# Patient Record
Sex: MALE | Race: WHITE | NOT HISPANIC OR LATINO | Employment: OTHER | URBAN - NONMETROPOLITAN AREA
[De-identification: names, ages, dates, MRNs, and addresses within clinical notes are randomized per-mention and may not be internally consistent; named-entity substitution may affect disease eponyms.]

---

## 2018-12-27 ENCOUNTER — APPOINTMENT (OUTPATIENT)
Dept: CT IMAGING | Facility: HOSPITAL | Age: 63
End: 2018-12-27

## 2018-12-27 ENCOUNTER — APPOINTMENT (OUTPATIENT)
Dept: GENERAL RADIOLOGY | Facility: HOSPITAL | Age: 63
End: 2018-12-27

## 2018-12-27 ENCOUNTER — HOSPITAL ENCOUNTER (INPATIENT)
Facility: HOSPITAL | Age: 63
LOS: 4 days | Discharge: HOME | End: 2018-12-31
Attending: EMERGENCY MEDICINE | Admitting: INTERNAL MEDICINE

## 2018-12-27 DIAGNOSIS — E13.10 DIABETIC KETOACIDOSIS WITHOUT COMA ASSOCIATED WITH OTHER SPECIFIED DIABETES MELLITUS (HCC): Primary | ICD-10-CM

## 2018-12-27 DIAGNOSIS — N17.9 AKI (ACUTE KIDNEY INJURY) (HCC): ICD-10-CM

## 2018-12-27 DIAGNOSIS — R07.9 CHEST PAIN, UNSPECIFIED TYPE: ICD-10-CM

## 2018-12-27 DIAGNOSIS — E11.10 DIABETIC KETOACIDOSIS WITHOUT COMA ASSOCIATED WITH TYPE 2 DIABETES MELLITUS (HCC): ICD-10-CM

## 2018-12-27 LAB
ACETONE BLD QL: ABNORMAL
ALBUMIN SERPL-MCNC: 4 G/DL (ref 3.4–4.8)
ALBUMIN/GLOB SERPL: 1.5 G/DL (ref 1.1–1.8)
ALP SERPL-CCNC: 79 U/L (ref 38–126)
ALT SERPL W P-5'-P-CCNC: 14 U/L (ref 21–72)
ANION GAP SERPL CALCULATED.3IONS-SCNC: 22 MMOL/L (ref 5–15)
APTT PPP: 31.9 SECONDS (ref 20–40.3)
ARTERIAL PATENCY WRIST A: ABNORMAL
AST SERPL-CCNC: 20 U/L (ref 17–59)
ATMOSPHERIC PRESS: 743 MMHG
BASE EXCESS BLDA CALC-SCNC: -12.3 MMOL/L (ref 0–2)
BASOPHILS # BLD AUTO: 0.02 10*3/MM3 (ref 0–0.2)
BASOPHILS NFR BLD AUTO: 0.2 % (ref 0–2)
BDY SITE: ABNORMAL
BILIRUB SERPL-MCNC: 0.3 MG/DL (ref 0.2–1.3)
BUN BLD-MCNC: 91 MG/DL (ref 7–21)
BUN/CREAT SERPL: 11.4 (ref 7–25)
CALCIUM SPEC-SCNC: 7.4 MG/DL (ref 8.4–10.2)
CHLORIDE SERPL-SCNC: 93 MMOL/L (ref 95–110)
CK MB SERPL-CCNC: 6.27 NG/ML (ref 0–5)
CK SERPL-CCNC: 219 U/L (ref 55–170)
CO2 SERPL-SCNC: 12 MMOL/L (ref 22–31)
CREAT BLD-MCNC: 7.96 MG/DL (ref 0.7–1.3)
D-DIMER, QUANTITATIVE (MAD,POW, STR): 1208 NG/ML (FEU) (ref 0–470)
D-LACTATE SERPL-SCNC: 1.1 MMOL/L (ref 0.5–2)
DEPRECATED RDW RBC AUTO: 47 FL (ref 35.1–43.9)
EOSINOPHIL # BLD AUTO: 0.07 10*3/MM3 (ref 0–0.7)
EOSINOPHIL NFR BLD AUTO: 0.6 % (ref 0–7)
ERYTHROCYTE [DISTWIDTH] IN BLOOD BY AUTOMATED COUNT: 14.9 % (ref 11.5–14.5)
GFR SERPL CREATININE-BSD FRML MDRD: 7 ML/MIN/1.73 (ref 49–113)
GFR SERPL CREATININE-BSD FRML MDRD: ABNORMAL ML/MIN/1.73 (ref 49–113)
GLOBULIN UR ELPH-MCNC: 2.6 GM/DL (ref 2.3–3.5)
GLUCOSE BLD-MCNC: 450 MG/DL (ref 60–100)
HCO3 BLDA-SCNC: 13.6 MMOL/L (ref 20–26)
HCT VFR BLD AUTO: 26.7 % (ref 39–49)
HGB BLD-MCNC: 9.2 G/DL (ref 13.7–17.3)
HOLD SPECIMEN: NORMAL
HOLD SPECIMEN: NORMAL
IMM GRANULOCYTES # BLD AUTO: 0.04 10*3/MM3 (ref 0–0.02)
IMM GRANULOCYTES NFR BLD AUTO: 0.3 % (ref 0–0.5)
INR PPP: 0.94 (ref 0.8–1.2)
LIPASE SERPL-CCNC: 24 U/L (ref 23–300)
LYMPHOCYTES # BLD AUTO: 1.62 10*3/MM3 (ref 0.6–4.2)
LYMPHOCYTES NFR BLD AUTO: 14 % (ref 10–50)
Lab: ABNORMAL
MCH RBC QN AUTO: 29.5 PG (ref 26.5–34)
MCHC RBC AUTO-ENTMCNC: 34.5 G/DL (ref 31.5–36.3)
MCV RBC AUTO: 85.6 FL (ref 80–98)
MODALITY: ABNORMAL
MONOCYTES # BLD AUTO: 0.87 10*3/MM3 (ref 0–0.9)
MONOCYTES NFR BLD AUTO: 7.5 % (ref 0–12)
NEUTROPHILS # BLD AUTO: 8.98 10*3/MM3 (ref 2–8.6)
NEUTROPHILS NFR BLD AUTO: 77.4 % (ref 37–80)
NT-PROBNP SERPL-MCNC: 6120 PG/ML (ref 0–900)
PCO2 BLDA: 30.4 MM HG (ref 35–45)
PH BLDA: 7.26 PH UNITS (ref 7.35–7.45)
PLATELET # BLD AUTO: 233 10*3/MM3 (ref 150–450)
PMV BLD AUTO: 9.2 FL (ref 8–12)
PO2 BLDA: 35.6 MM HG (ref 83–108)
POTASSIUM BLD-SCNC: 4.3 MMOL/L (ref 3.5–5.1)
PROT SERPL-MCNC: 6.6 G/DL (ref 6.3–8.6)
PROTHROMBIN TIME: 12.4 SECONDS (ref 11.1–15.3)
RBC # BLD AUTO: 3.12 10*6/MM3 (ref 4.37–5.74)
SAO2 % BLDCOA: 63 % (ref 94–99)
SODIUM BLD-SCNC: 127 MMOL/L (ref 137–145)
TROPONIN I SERPL-MCNC: 0.02 NG/ML
VENTILATOR MODE: ABNORMAL
WBC NRBC COR # BLD: 11.6 10*3/MM3 (ref 3.2–9.8)
WHOLE BLOOD HOLD SPECIMEN: NORMAL
WHOLE BLOOD HOLD SPECIMEN: NORMAL

## 2018-12-27 PROCEDURE — 83880 ASSAY OF NATRIURETIC PEPTIDE: CPT

## 2018-12-27 PROCEDURE — 71045 X-RAY EXAM CHEST 1 VIEW: CPT

## 2018-12-27 PROCEDURE — 85379 FIBRIN DEGRADATION QUANT: CPT | Performed by: EMERGENCY MEDICINE

## 2018-12-27 PROCEDURE — 83690 ASSAY OF LIPASE: CPT | Performed by: EMERGENCY MEDICINE

## 2018-12-27 PROCEDURE — 85610 PROTHROMBIN TIME: CPT | Performed by: EMERGENCY MEDICINE

## 2018-12-27 PROCEDURE — 80053 COMPREHEN METABOLIC PANEL: CPT

## 2018-12-27 PROCEDURE — 25010000002 ONDANSETRON PER 1 MG: Performed by: EMERGENCY MEDICINE

## 2018-12-27 PROCEDURE — 84484 ASSAY OF TROPONIN QUANT: CPT

## 2018-12-27 PROCEDURE — 99285 EMERGENCY DEPT VISIT HI MDM: CPT

## 2018-12-27 PROCEDURE — 82009 KETONE BODYS QUAL: CPT | Performed by: EMERGENCY MEDICINE

## 2018-12-27 PROCEDURE — 93010 ELECTROCARDIOGRAM REPORT: CPT | Performed by: INTERNAL MEDICINE

## 2018-12-27 PROCEDURE — 87040 BLOOD CULTURE FOR BACTERIA: CPT | Performed by: EMERGENCY MEDICINE

## 2018-12-27 PROCEDURE — 84484 ASSAY OF TROPONIN QUANT: CPT | Performed by: EMERGENCY MEDICINE

## 2018-12-27 PROCEDURE — 82962 GLUCOSE BLOOD TEST: CPT

## 2018-12-27 PROCEDURE — 85025 COMPLETE CBC W/AUTO DIFF WBC: CPT

## 2018-12-27 PROCEDURE — 36600 WITHDRAWAL OF ARTERIAL BLOOD: CPT

## 2018-12-27 PROCEDURE — 82803 BLOOD GASES ANY COMBINATION: CPT

## 2018-12-27 PROCEDURE — 93005 ELECTROCARDIOGRAM TRACING: CPT | Performed by: EMERGENCY MEDICINE

## 2018-12-27 PROCEDURE — 25010000002 MORPHINE PER 10 MG: Performed by: EMERGENCY MEDICINE

## 2018-12-27 PROCEDURE — 25010000002 HEPARIN (PORCINE) PER 1000 UNITS: Performed by: EMERGENCY MEDICINE

## 2018-12-27 PROCEDURE — 71250 CT THORAX DX C-: CPT

## 2018-12-27 PROCEDURE — 93005 ELECTROCARDIOGRAM TRACING: CPT

## 2018-12-27 PROCEDURE — 83605 ASSAY OF LACTIC ACID: CPT | Performed by: EMERGENCY MEDICINE

## 2018-12-27 PROCEDURE — 82553 CREATINE MB FRACTION: CPT | Performed by: EMERGENCY MEDICINE

## 2018-12-27 PROCEDURE — 85730 THROMBOPLASTIN TIME PARTIAL: CPT | Performed by: EMERGENCY MEDICINE

## 2018-12-27 PROCEDURE — 82550 ASSAY OF CK (CPK): CPT | Performed by: EMERGENCY MEDICINE

## 2018-12-27 PROCEDURE — 74176 CT ABD & PELVIS W/O CONTRAST: CPT

## 2018-12-27 PROCEDURE — 63710000001 INSULIN REGULAR HUMAN PER 5 UNITS: Performed by: EMERGENCY MEDICINE

## 2018-12-27 PROCEDURE — 36415 COLL VENOUS BLD VENIPUNCTURE: CPT | Performed by: EMERGENCY MEDICINE

## 2018-12-27 PROCEDURE — 25010000002 FENTANYL CITRATE (PF) 100 MCG/2ML SOLUTION: Performed by: EMERGENCY MEDICINE

## 2018-12-27 RX ORDER — ONDANSETRON 2 MG/ML
4 INJECTION INTRAMUSCULAR; INTRAVENOUS ONCE
Status: COMPLETED | OUTPATIENT
Start: 2018-12-27 | End: 2018-12-27

## 2018-12-27 RX ORDER — ASPIRIN 81 MG/1
81 TABLET, CHEWABLE ORAL DAILY
COMMUNITY
End: 2018-12-31 | Stop reason: HOSPADM

## 2018-12-27 RX ORDER — SODIUM CHLORIDE 450 MG/100ML
250 INJECTION, SOLUTION INTRAVENOUS CONTINUOUS
Status: DISCONTINUED | OUTPATIENT
Start: 2018-12-27 | End: 2018-12-28

## 2018-12-27 RX ORDER — MAGNESIUM SULFATE HEPTAHYDRATE 40 MG/ML
2 INJECTION, SOLUTION INTRAVENOUS AS NEEDED
Status: DISCONTINUED | OUTPATIENT
Start: 2018-12-27 | End: 2018-12-31 | Stop reason: HOSPADM

## 2018-12-27 RX ORDER — SODIUM CHLORIDE 0.9 % (FLUSH) 0.9 %
10 SYRINGE (ML) INJECTION AS NEEDED
Status: DISCONTINUED | OUTPATIENT
Start: 2018-12-27 | End: 2018-12-31 | Stop reason: HOSPADM

## 2018-12-27 RX ORDER — LOSARTAN POTASSIUM 25 MG/1
100 TABLET ORAL DAILY
COMMUNITY

## 2018-12-27 RX ORDER — DEXTROSE AND SODIUM CHLORIDE 5; .45 G/100ML; G/100ML
150 INJECTION, SOLUTION INTRAVENOUS CONTINUOUS PRN
Status: DISCONTINUED | OUTPATIENT
Start: 2018-12-27 | End: 2018-12-29

## 2018-12-27 RX ORDER — POTASSIUM CHLORIDE 1.5 G/1.77G
40 POWDER, FOR SOLUTION ORAL AS NEEDED
Status: DISCONTINUED | OUTPATIENT
Start: 2018-12-27 | End: 2018-12-31 | Stop reason: HOSPADM

## 2018-12-27 RX ORDER — FENTANYL CITRATE 50 UG/ML
50 INJECTION, SOLUTION INTRAMUSCULAR; INTRAVENOUS ONCE
Status: COMPLETED | OUTPATIENT
Start: 2018-12-27 | End: 2018-12-27

## 2018-12-27 RX ORDER — NITROGLYCERIN 0.4 MG/1
0.4 TABLET SUBLINGUAL
Status: DISCONTINUED | OUTPATIENT
Start: 2018-12-27 | End: 2018-12-31 | Stop reason: HOSPADM

## 2018-12-27 RX ORDER — GUANFACINE 1 MG/1
1 TABLET ORAL NIGHTLY
COMMUNITY
End: 2020-05-05 | Stop reason: HOSPADM

## 2018-12-27 RX ORDER — HEPARIN SODIUM 5000 [USP'U]/ML
60 INJECTION, SOLUTION INTRAVENOUS; SUBCUTANEOUS ONCE
Status: COMPLETED | OUTPATIENT
Start: 2018-12-27 | End: 2018-12-27

## 2018-12-27 RX ORDER — POTASSIUM CHLORIDE 7.45 MG/ML
10 INJECTION INTRAVENOUS AS NEEDED
Status: DISCONTINUED | OUTPATIENT
Start: 2018-12-27 | End: 2018-12-31 | Stop reason: HOSPADM

## 2018-12-27 RX ORDER — HYDROCODONE BITARTRATE AND ACETAMINOPHEN 7.5; 325 MG/1; MG/1
1 TABLET ORAL EVERY 8 HOURS PRN
COMMUNITY

## 2018-12-27 RX ORDER — MAGNESIUM SULFATE HEPTAHYDRATE 40 MG/ML
4 INJECTION, SOLUTION INTRAVENOUS AS NEEDED
Status: DISCONTINUED | OUTPATIENT
Start: 2018-12-27 | End: 2018-12-31 | Stop reason: HOSPADM

## 2018-12-27 RX ORDER — HEPARIN SODIUM 5000 [USP'U]/ML
80 INJECTION, SOLUTION INTRAVENOUS; SUBCUTANEOUS AS NEEDED
Status: DISCONTINUED | OUTPATIENT
Start: 2018-12-27 | End: 2018-12-29

## 2018-12-27 RX ORDER — POTASSIUM CHLORIDE 750 MG/1
40 CAPSULE, EXTENDED RELEASE ORAL AS NEEDED
Status: DISCONTINUED | OUTPATIENT
Start: 2018-12-27 | End: 2018-12-31 | Stop reason: HOSPADM

## 2018-12-27 RX ORDER — HYDRALAZINE HYDROCHLORIDE 50 MG/1
100 TABLET, FILM COATED ORAL 3 TIMES DAILY
COMMUNITY
End: 2020-05-05 | Stop reason: HOSPADM

## 2018-12-27 RX ORDER — ASPIRIN 325 MG
325 TABLET ORAL ONCE
Status: COMPLETED | OUTPATIENT
Start: 2018-12-27 | End: 2018-12-27

## 2018-12-27 RX ORDER — NITROGLYCERIN 20 MG/100ML
10-50 INJECTION INTRAVENOUS
Status: DISCONTINUED | OUTPATIENT
Start: 2018-12-27 | End: 2018-12-27

## 2018-12-27 RX ORDER — TORSEMIDE 20 MG/1
20 TABLET ORAL 3 TIMES DAILY
COMMUNITY

## 2018-12-27 RX ORDER — HEPARIN SODIUM 5000 [USP'U]/ML
40 INJECTION, SOLUTION INTRAVENOUS; SUBCUTANEOUS AS NEEDED
Status: DISCONTINUED | OUTPATIENT
Start: 2018-12-27 | End: 2018-12-29

## 2018-12-27 RX ORDER — POTASSIUM CHLORIDE 750 MG/1
10 CAPSULE, EXTENDED RELEASE ORAL AS NEEDED
Status: DISCONTINUED | OUTPATIENT
Start: 2018-12-27 | End: 2018-12-31 | Stop reason: HOSPADM

## 2018-12-27 RX ORDER — SODIUM CHLORIDE AND POTASSIUM CHLORIDE 150; 450 MG/100ML; MG/100ML
250 INJECTION, SOLUTION INTRAVENOUS CONTINUOUS PRN
Status: DISCONTINUED | OUTPATIENT
Start: 2018-12-27 | End: 2018-12-31 | Stop reason: HOSPADM

## 2018-12-27 RX ORDER — DEXTROSE, SODIUM CHLORIDE, AND POTASSIUM CHLORIDE 5; .45; .15 G/100ML; G/100ML; G/100ML
150 INJECTION INTRAVENOUS CONTINUOUS PRN
Status: DISCONTINUED | OUTPATIENT
Start: 2018-12-27 | End: 2018-12-29

## 2018-12-27 RX ORDER — HEPARIN SODIUM 10000 [USP'U]/100ML
12 INJECTION, SOLUTION INTRAVENOUS
Status: DISCONTINUED | OUTPATIENT
Start: 2018-12-27 | End: 2018-12-29

## 2018-12-27 RX ORDER — POTASSIUM CHLORIDE 750 MG/1
20 CAPSULE, EXTENDED RELEASE ORAL AS NEEDED
Status: DISCONTINUED | OUTPATIENT
Start: 2018-12-27 | End: 2018-12-31 | Stop reason: HOSPADM

## 2018-12-27 RX ORDER — MAGNESIUM SULFATE 1 G/100ML
1 INJECTION INTRAVENOUS AS NEEDED
Status: DISCONTINUED | OUTPATIENT
Start: 2018-12-27 | End: 2018-12-31 | Stop reason: HOSPADM

## 2018-12-27 RX ORDER — POTASSIUM CHLORIDE 1.5 G/1.77G
20 POWDER, FOR SOLUTION ORAL AS NEEDED
Status: DISCONTINUED | OUTPATIENT
Start: 2018-12-27 | End: 2018-12-31 | Stop reason: HOSPADM

## 2018-12-27 RX ORDER — DEXTROSE MONOHYDRATE 25 G/50ML
12.5 INJECTION, SOLUTION INTRAVENOUS
Status: DISCONTINUED | OUTPATIENT
Start: 2018-12-27 | End: 2018-12-31 | Stop reason: HOSPADM

## 2018-12-27 RX ORDER — SODIUM CHLORIDE 9 MG/ML
125 INJECTION, SOLUTION INTRAVENOUS CONTINUOUS
Status: DISCONTINUED | OUTPATIENT
Start: 2018-12-27 | End: 2018-12-28

## 2018-12-27 RX ORDER — MINOXIDIL 2.5 MG/1
2.5 TABLET ORAL DAILY
Status: ON HOLD | COMMUNITY
End: 2020-05-28

## 2018-12-27 RX ORDER — POTASSIUM CHLORIDE 1.5 G/1.77G
10 POWDER, FOR SOLUTION ORAL AS NEEDED
Status: DISCONTINUED | OUTPATIENT
Start: 2018-12-27 | End: 2018-12-31 | Stop reason: HOSPADM

## 2018-12-27 RX ADMIN — ASPIRIN 325 MG: 325 TABLET, COATED ORAL at 19:08

## 2018-12-27 RX ADMIN — POTASSIUM CHLORIDE AND SODIUM CHLORIDE 250 ML/HR: 450; 150 INJECTION, SOLUTION INTRAVENOUS at 23:15

## 2018-12-27 RX ADMIN — MORPHINE SULFATE 4 MG: 4 INJECTION INTRAVENOUS at 21:23

## 2018-12-27 RX ADMIN — SODIUM CHLORIDE 1000 ML: 9 INJECTION, SOLUTION INTRAVENOUS at 21:28

## 2018-12-27 RX ADMIN — FENTANYL CITRATE 50 MCG: 50 INJECTION, SOLUTION INTRAMUSCULAR; INTRAVENOUS at 22:31

## 2018-12-27 RX ADMIN — ONDANSETRON 4 MG: 2 INJECTION INTRAMUSCULAR; INTRAVENOUS at 21:23

## 2018-12-27 RX ADMIN — NITROGLYCERIN 0.4 MG: 0.4 TABLET, ORALLY DISINTEGRATING SUBLINGUAL at 20:08

## 2018-12-27 RX ADMIN — SODIUM CHLORIDE 0.1 UNITS/KG/HR: 9 INJECTION, SOLUTION INTRAVENOUS at 21:50

## 2018-12-27 RX ADMIN — SODIUM CHLORIDE 1000 ML: 9 INJECTION, SOLUTION INTRAVENOUS at 20:08

## 2018-12-27 RX ADMIN — NITROGLYCERIN 0.4 MG: 0.4 TABLET, ORALLY DISINTEGRATING SUBLINGUAL at 21:10

## 2018-12-27 RX ADMIN — NITROGLYCERIN 0.4 MG: 0.4 TABLET, ORALLY DISINTEGRATING SUBLINGUAL at 20:28

## 2018-12-27 RX ADMIN — NITROGLYCERIN 0.4 MG: 0.4 TABLET, ORALLY DISINTEGRATING SUBLINGUAL at 23:57

## 2018-12-27 RX ADMIN — HEPARIN SODIUM 4500 UNITS: 5000 INJECTION INTRAVENOUS; SUBCUTANEOUS at 23:50

## 2018-12-27 RX ADMIN — HEPARIN SODIUM 12 UNITS/KG/HR: 10000 INJECTION, SOLUTION INTRAVENOUS at 23:43

## 2018-12-28 ENCOUNTER — APPOINTMENT (OUTPATIENT)
Dept: CARDIOLOGY | Facility: HOSPITAL | Age: 63
End: 2018-12-28
Attending: FAMILY MEDICINE

## 2018-12-28 ENCOUNTER — APPOINTMENT (OUTPATIENT)
Dept: ULTRASOUND IMAGING | Facility: HOSPITAL | Age: 63
End: 2018-12-28

## 2018-12-28 ENCOUNTER — APPOINTMENT (OUTPATIENT)
Dept: NUCLEAR MEDICINE | Facility: HOSPITAL | Age: 63
End: 2018-12-28

## 2018-12-28 PROBLEM — N18.5 CKD (CHRONIC KIDNEY DISEASE) STAGE 5, GFR LESS THAN 15 ML/MIN (HCC): Chronic | Status: ACTIVE | Noted: 2018-12-28

## 2018-12-28 PROBLEM — I21.4 NSTEMI (NON-ST ELEVATED MYOCARDIAL INFARCTION) (HCC): Status: ACTIVE | Noted: 2018-12-28

## 2018-12-28 LAB
ABO GROUP BLD: NORMAL
ABO GROUP BLD: NORMAL
ANION GAP SERPL CALCULATED.3IONS-SCNC: 13 MMOL/L (ref 5–15)
ANION GAP SERPL CALCULATED.3IONS-SCNC: 14 MMOL/L (ref 5–15)
ANION GAP SERPL CALCULATED.3IONS-SCNC: 14 MMOL/L (ref 5–15)
ANION GAP SERPL CALCULATED.3IONS-SCNC: 15 MMOL/L (ref 5–15)
ANION GAP SERPL CALCULATED.3IONS-SCNC: 16 MMOL/L (ref 5–15)
ANION GAP SERPL CALCULATED.3IONS-SCNC: 18 MMOL/L (ref 5–15)
APTT PPP: 27.6 SECONDS (ref 20–40.3)
ATMOSPHERIC PRESS: ABNORMAL MMHG
BACTERIA UR QL AUTO: ABNORMAL /HPF
BASE EXCESS BLDV CALC-SCNC: -10.4 MMOL/L (ref 0–2)
BASE EXCESS BLDV CALC-SCNC: -11.3 MMOL/L (ref 0–2)
BASE EXCESS BLDV CALC-SCNC: -11.9 MMOL/L (ref 0–2)
BASE EXCESS BLDV CALC-SCNC: -11.9 MMOL/L (ref 0–2)
BASE EXCESS BLDV CALC-SCNC: -13.9 MMOL/L (ref 0–2)
BASE EXCESS BLDV CALC-SCNC: -8.2 MMOL/L (ref 0–2)
BASOPHILS # BLD AUTO: 0 10*3/MM3 (ref 0–0.2)
BASOPHILS NFR BLD AUTO: 0 % (ref 0–2)
BDY SITE: ABNORMAL
BH CV ECHO MEAS - ACS: 2 CM
BH CV ECHO MEAS - AO MAX PG (FULL): 2.3 MMHG
BH CV ECHO MEAS - AO MAX PG: 11.2 MMHG
BH CV ECHO MEAS - AO MEAN PG (FULL): 1.6 MMHG
BH CV ECHO MEAS - AO MEAN PG: 6.5 MMHG
BH CV ECHO MEAS - AO ROOT AREA (BSA CORRECTED): 1.6
BH CV ECHO MEAS - AO ROOT AREA: 7.2 CM^2
BH CV ECHO MEAS - AO ROOT DIAM: 3 CM
BH CV ECHO MEAS - AO V2 MAX: 167 CM/SEC
BH CV ECHO MEAS - AO V2 MEAN: 122.9 CM/SEC
BH CV ECHO MEAS - AO V2 VTI: 42 CM
BH CV ECHO MEAS - ASC AORTA: 2.5 CM
BH CV ECHO MEAS - AVA(I,A): 3.1 CM^2
BH CV ECHO MEAS - AVA(I,D): 3.1 CM^2
BH CV ECHO MEAS - AVA(V,A): 3.1 CM^2
BH CV ECHO MEAS - AVA(V,D): 3.1 CM^2
BH CV ECHO MEAS - BSA(HAYCOCK): 1.9 M^2
BH CV ECHO MEAS - BSA: 1.9 M^2
BH CV ECHO MEAS - BZI_BMI: 23.7 KILOGRAMS/M^2
BH CV ECHO MEAS - BZI_METRIC_HEIGHT: 178 CM
BH CV ECHO MEAS - BZI_METRIC_WEIGHT: 75 KG
BH CV ECHO MEAS - EDV(CUBED): 108.1 ML
BH CV ECHO MEAS - EDV(TEICH): 105.6 ML
BH CV ECHO MEAS - EF(CUBED): 74.9 %
BH CV ECHO MEAS - EF(TEICH): 66.7 %
BH CV ECHO MEAS - ESV(CUBED): 27.1 ML
BH CV ECHO MEAS - ESV(TEICH): 35.2 ML
BH CV ECHO MEAS - FS: 36.9 %
BH CV ECHO MEAS - IVS/LVPW: 0.84
BH CV ECHO MEAS - IVSD: 1 CM
BH CV ECHO MEAS - LA DIMENSION: 4 CM
BH CV ECHO MEAS - LA/AO: 1.3
BH CV ECHO MEAS - LV MASS(C)D: 195 GRAMS
BH CV ECHO MEAS - LV MASS(C)DI: 101.2 GRAMS/M^2
BH CV ECHO MEAS - LV MAX PG: 8.9 MMHG
BH CV ECHO MEAS - LV MEAN PG: 4.9 MMHG
BH CV ECHO MEAS - LV V1 MAX: 149 CM/SEC
BH CV ECHO MEAS - LV V1 MEAN: 105 CM/SEC
BH CV ECHO MEAS - LV V1 VTI: 36.8 CM
BH CV ECHO MEAS - LVIDD: 4.8 CM
BH CV ECHO MEAS - LVIDS: 3 CM
BH CV ECHO MEAS - LVOT AREA: 3.5 CM^2
BH CV ECHO MEAS - LVOT DIAM: 2.1 CM
BH CV ECHO MEAS - LVPWD: 1.2 CM
BH CV ECHO MEAS - MR MAX PG: 29.8 MMHG
BH CV ECHO MEAS - MR MAX VEL: 273 CM/SEC
BH CV ECHO MEAS - MV A MAX VEL: 94.3 CM/SEC
BH CV ECHO MEAS - MV E MAX VEL: 162.7 CM/SEC
BH CV ECHO MEAS - MV E/A: 1.7
BH CV ECHO MEAS - MV P1/2T MAX VEL: 183 CM/SEC
BH CV ECHO MEAS - MVA P1/2T LCG: 1.2 CM^2
BH CV ECHO MEAS - PA MAX PG: 5.2 MMHG
BH CV ECHO MEAS - PA V2 MAX: 113.8 CM/SEC
BH CV ECHO MEAS - RAP SYSTOLE: 10 MMHG
BH CV ECHO MEAS - RVDD: 2.7 CM
BH CV ECHO MEAS - RVSP: 39 MMHG
BH CV ECHO MEAS - SI(AO): 157.7 ML/M^2
BH CV ECHO MEAS - SI(CUBED): 42 ML/M^2
BH CV ECHO MEAS - SI(LVOT): 67.2 ML/M^2
BH CV ECHO MEAS - SI(TEICH): 36.6 ML/M^2
BH CV ECHO MEAS - SV(AO): 303.8 ML
BH CV ECHO MEAS - SV(CUBED): 81 ML
BH CV ECHO MEAS - SV(LVOT): 129.4 ML
BH CV ECHO MEAS - SV(TEICH): 70.5 ML
BH CV ECHO MEAS - TR MAX VEL: 255.9 CM/SEC
BILIRUB UR QL STRIP: NEGATIVE
BLD GP AB SCN SERPL QL: NEGATIVE
BUN BLD-MCNC: 93 MG/DL (ref 7–21)
BUN BLD-MCNC: 93 MG/DL (ref 7–21)
BUN BLD-MCNC: 94 MG/DL (ref 7–21)
BUN BLD-MCNC: 94 MG/DL (ref 7–21)
BUN BLD-MCNC: 97 MG/DL (ref 7–21)
BUN BLD-MCNC: 99 MG/DL (ref 7–21)
BUN/CREAT SERPL: 12.4 (ref 7–25)
BUN/CREAT SERPL: 12.8 (ref 7–25)
BUN/CREAT SERPL: 12.8 (ref 7–25)
BUN/CREAT SERPL: 13 (ref 7–25)
BUN/CREAT SERPL: 13.7 (ref 7–25)
BUN/CREAT SERPL: 13.9 (ref 7–25)
CA-I BLD-MCNC: 3.69 MG/DL (ref 4.6–5.6)
CA-I BLD-MCNC: 3.74 MG/DL (ref 4.6–5.6)
CA-I BLD-MCNC: 3.8 MG/DL (ref 4.6–5.6)
CA-I BLD-MCNC: 3.82 MG/DL (ref 4.6–5.6)
CA-I BLD-MCNC: 3.84 MG/DL (ref 4.6–5.6)
CA-I BLD-MCNC: 3.86 MG/DL (ref 4.6–5.6)
CALCIUM SPEC-SCNC: 6.8 MG/DL (ref 8.4–10.2)
CALCIUM SPEC-SCNC: 6.9 MG/DL (ref 8.4–10.2)
CALCIUM SPEC-SCNC: 6.9 MG/DL (ref 8.4–10.2)
CALCIUM SPEC-SCNC: 7 MG/DL (ref 8.4–10.2)
CALCIUM SPEC-SCNC: 7.1 MG/DL (ref 8.4–10.2)
CALCIUM SPEC-SCNC: 7.2 MG/DL (ref 8.4–10.2)
CHLORIDE SERPL-SCNC: 102 MMOL/L (ref 95–110)
CHLORIDE SERPL-SCNC: 102 MMOL/L (ref 95–110)
CHLORIDE SERPL-SCNC: 103 MMOL/L (ref 95–110)
CHLORIDE SERPL-SCNC: 104 MMOL/L (ref 95–110)
CHLORIDE SERPL-SCNC: 99 MMOL/L (ref 95–110)
CHLORIDE SERPL-SCNC: 99 MMOL/L (ref 95–110)
CLARITY UR: CLEAR
CO2 SERPL-SCNC: 12 MMOL/L (ref 22–31)
CO2 SERPL-SCNC: 13 MMOL/L (ref 22–31)
CO2 SERPL-SCNC: 14 MMOL/L (ref 22–31)
CO2 SERPL-SCNC: 16 MMOL/L (ref 22–31)
COLOR UR: YELLOW
CREAT BLD-MCNC: 7.1 MG/DL (ref 0.7–1.3)
CREAT BLD-MCNC: 7.12 MG/DL (ref 0.7–1.3)
CREAT BLD-MCNC: 7.17 MG/DL (ref 0.7–1.3)
CREAT BLD-MCNC: 7.28 MG/DL (ref 0.7–1.3)
CREAT BLD-MCNC: 7.33 MG/DL (ref 0.7–1.3)
CREAT BLD-MCNC: 7.56 MG/DL (ref 0.7–1.3)
DEPRECATED RDW RBC AUTO: 46.4 FL (ref 35.1–43.9)
EOSINOPHIL # BLD AUTO: 0 10*3/MM3 (ref 0–0.7)
EOSINOPHIL NFR BLD AUTO: 0 % (ref 0–7)
ERYTHROCYTE [DISTWIDTH] IN BLOOD BY AUTOMATED COUNT: 14.6 % (ref 11.5–14.5)
GFR SERPL CREATININE-BSD FRML MDRD: 7 ML/MIN/1.73 (ref 49–113)
GFR SERPL CREATININE-BSD FRML MDRD: 8 ML/MIN/1.73 (ref 49–113)
GFR SERPL CREATININE-BSD FRML MDRD: ABNORMAL ML/MIN/1.73 (ref 49–113)
GLUCOSE BLD-MCNC: 126 MG/DL (ref 60–100)
GLUCOSE BLD-MCNC: 129 MG/DL (ref 60–100)
GLUCOSE BLD-MCNC: 162 MG/DL (ref 60–100)
GLUCOSE BLD-MCNC: 215 MG/DL (ref 60–100)
GLUCOSE BLD-MCNC: 335 MG/DL (ref 60–100)
GLUCOSE BLD-MCNC: 96 MG/DL (ref 60–100)
GLUCOSE BLDC GLUCOMTR-MCNC: 121 MG/DL (ref 70–130)
GLUCOSE BLDC GLUCOMTR-MCNC: 142 MG/DL (ref 70–130)
GLUCOSE BLDC GLUCOMTR-MCNC: 143 MG/DL (ref 70–130)
GLUCOSE BLDC GLUCOMTR-MCNC: 144 MG/DL (ref 70–130)
GLUCOSE BLDC GLUCOMTR-MCNC: 151 MG/DL (ref 70–130)
GLUCOSE BLDC GLUCOMTR-MCNC: 154 MG/DL (ref 70–130)
GLUCOSE BLDC GLUCOMTR-MCNC: 185 MG/DL (ref 70–130)
GLUCOSE BLDC GLUCOMTR-MCNC: 200 MG/DL (ref 70–130)
GLUCOSE BLDC GLUCOMTR-MCNC: 237 MG/DL (ref 70–130)
GLUCOSE BLDC GLUCOMTR-MCNC: 311 MG/DL (ref 70–130)
GLUCOSE BLDC GLUCOMTR-MCNC: 383 MG/DL (ref 70–130)
GLUCOSE BLDC GLUCOMTR-MCNC: 90 MG/DL (ref 70–130)
GLUCOSE UR STRIP-MCNC: ABNORMAL MG/DL
HCO3 BLDV-SCNC: 12.2 MMOL/L (ref 18–23)
HCO3 BLDV-SCNC: 14.1 MMOL/L (ref 18–23)
HCO3 BLDV-SCNC: 14.3 MMOL/L (ref 18–23)
HCO3 BLDV-SCNC: 14.8 MMOL/L (ref 18–23)
HCO3 BLDV-SCNC: 15 MMOL/L (ref 18–23)
HCO3 BLDV-SCNC: 16.5 MMOL/L (ref 18–23)
HCT VFR BLD AUTO: 20.9 % (ref 39–49)
HCT VFR BLD AUTO: 21.1 % (ref 39–49)
HGB BLD-MCNC: 7.1 G/DL (ref 13.7–17.3)
HGB BLD-MCNC: 7.3 G/DL (ref 13.7–17.3)
HGB UR QL STRIP.AUTO: NEGATIVE
HYALINE CASTS UR QL AUTO: ABNORMAL /LPF
IMM GRANULOCYTES # BLD AUTO: 0.02 10*3/MM3 (ref 0–0.02)
IMM GRANULOCYTES NFR BLD AUTO: 0.2 % (ref 0–0.5)
KETONES UR QL STRIP: NEGATIVE
LEUKOCYTE ESTERASE UR QL STRIP.AUTO: NEGATIVE
LYMPHOCYTES # BLD AUTO: 0.87 10*3/MM3 (ref 0.6–4.2)
LYMPHOCYTES NFR BLD AUTO: 8.9 % (ref 10–50)
Lab: NORMAL
MAGNESIUM SERPL-MCNC: 1.6 MG/DL (ref 1.6–2.3)
MAGNESIUM SERPL-MCNC: 1.6 MG/DL (ref 1.6–2.3)
MAGNESIUM SERPL-MCNC: 1.7 MG/DL (ref 1.6–2.3)
MAXIMAL PREDICTED HEART RATE: 155 BPM
MCH RBC QN AUTO: 28.7 PG (ref 26.5–34)
MCHC RBC AUTO-ENTMCNC: 33.6 G/DL (ref 31.5–36.3)
MCV RBC AUTO: 85.4 FL (ref 80–98)
MODALITY: ABNORMAL
MONOCYTES # BLD AUTO: 0.76 10*3/MM3 (ref 0–0.9)
MONOCYTES NFR BLD AUTO: 7.8 % (ref 0–12)
NEUTROPHILS # BLD AUTO: 8.13 10*3/MM3 (ref 2–8.6)
NEUTROPHILS NFR BLD AUTO: 83.1 % (ref 37–80)
NITRITE UR QL STRIP: NEGATIVE
NRBC BLD AUTO-RTO: 0 /100 WBC (ref 0–0)
PCO2 BLDV: 28.4 MM HG (ref 41–51)
PCO2 BLDV: 29 MM HG (ref 41–51)
PCO2 BLDV: 30 MM HG (ref 41–51)
PCO2 BLDV: 31.2 MM HG (ref 41–51)
PCO2 BLDV: 32.8 MM HG (ref 41–51)
PCO2 BLDV: 34.5 MM HG (ref 41–51)
PH BLDV: 7.24 PH UNITS (ref 7.29–7.37)
PH BLDV: 7.25 PH UNITS (ref 7.29–7.37)
PH BLDV: 7.25 PH UNITS (ref 7.29–7.37)
PH BLDV: 7.26 PH UNITS (ref 7.29–7.37)
PH BLDV: 7.31 PH UNITS (ref 7.29–7.37)
PH BLDV: 7.35 PH UNITS (ref 7.29–7.37)
PH UR STRIP.AUTO: 5.5 [PH] (ref 5–9)
PHOSPHATE SERPL-MCNC: 10 MG/DL (ref 2.4–4.4)
PHOSPHATE SERPL-MCNC: 9.1 MG/DL (ref 2.4–4.4)
PHOSPHATE SERPL-MCNC: 9.3 MG/DL (ref 2.4–4.4)
PHOSPHATE SERPL-MCNC: 9.6 MG/DL (ref 2.4–4.4)
PHOSPHATE SERPL-MCNC: 9.6 MG/DL (ref 2.4–4.4)
PHOSPHATE SERPL-MCNC: 9.7 MG/DL (ref 2.4–4.4)
PLATELET # BLD AUTO: 180 10*3/MM3 (ref 150–450)
PMV BLD AUTO: 9.5 FL (ref 8–12)
PO2 BLDV: 107 MM HG (ref 27–53)
PO2 BLDV: 135 MM HG (ref 27–53)
PO2 BLDV: 62.9 MM HG (ref 27–53)
PO2 BLDV: 67 MM HG (ref 27–53)
PO2 BLDV: 75.9 MM HG (ref 27–53)
PO2 BLDV: 80.4 MM HG (ref 27–53)
POTASSIUM BLD-SCNC: 3.8 MMOL/L (ref 3.5–5.1)
POTASSIUM BLD-SCNC: 3.9 MMOL/L (ref 3.5–5.1)
POTASSIUM BLD-SCNC: 4 MMOL/L (ref 3.5–5.1)
POTASSIUM BLD-SCNC: 4.3 MMOL/L (ref 3.5–5.1)
PROT UR QL STRIP: ABNORMAL
RBC # BLD AUTO: 2.47 10*6/MM3 (ref 4.37–5.74)
RBC # UR: ABNORMAL /HPF
REF LAB TEST METHOD: ABNORMAL
RH BLD: NEGATIVE
RH BLD: NEGATIVE
SAO2 % BLDCOV: 92.7 % (ref 45–75)
SAO2 % BLDCOV: 93 % (ref 45–75)
SAO2 % BLDCOV: 98.2 % (ref 45–75)
SAO2 % BLDCOV: 99.2 % (ref 45–75)
SODIUM BLD-SCNC: 129 MMOL/L (ref 137–145)
SODIUM BLD-SCNC: 131 MMOL/L (ref 137–145)
SP GR UR STRIP: 1.01 (ref 1–1.03)
SQUAMOUS #/AREA URNS HPF: ABNORMAL /HPF
STRESS TARGET HR: 132 BPM
T&S EXPIRATION DATE: NORMAL
TROPONIN I SERPL-MCNC: 2.97 NG/ML
UROBILINOGEN UR QL STRIP: ABNORMAL
WBC NRBC COR # BLD: 9.78 10*3/MM3 (ref 3.2–9.8)
WBC UR QL AUTO: ABNORMAL /HPF
WHOLE BLOOD HOLD SPECIMEN: NORMAL
WHOLE BLOOD HOLD SPECIMEN: NORMAL

## 2018-12-28 PROCEDURE — 82330 ASSAY OF CALCIUM: CPT | Performed by: EMERGENCY MEDICINE

## 2018-12-28 PROCEDURE — 80048 BASIC METABOLIC PNL TOTAL CA: CPT | Performed by: EMERGENCY MEDICINE

## 2018-12-28 PROCEDURE — 84100 ASSAY OF PHOSPHORUS: CPT | Performed by: EMERGENCY MEDICINE

## 2018-12-28 PROCEDURE — 0 TECHNETIUM TC 99M PENTETATE KIT: Performed by: FAMILY MEDICINE

## 2018-12-28 PROCEDURE — 63710000001 INSULIN ASPART PER 5 UNITS: Performed by: FAMILY MEDICINE

## 2018-12-28 PROCEDURE — 81001 URINALYSIS AUTO W/SCOPE: CPT | Performed by: EMERGENCY MEDICINE

## 2018-12-28 PROCEDURE — 93970 EXTREMITY STUDY: CPT

## 2018-12-28 PROCEDURE — 85730 THROMBOPLASTIN TIME PARTIAL: CPT | Performed by: FAMILY MEDICINE

## 2018-12-28 PROCEDURE — 86901 BLOOD TYPING SEROLOGIC RH(D): CPT | Performed by: FAMILY MEDICINE

## 2018-12-28 PROCEDURE — 82803 BLOOD GASES ANY COMBINATION: CPT | Performed by: FAMILY MEDICINE

## 2018-12-28 PROCEDURE — 0 TECHNETIUM ALBUMIN AGGREGATED: Performed by: FAMILY MEDICINE

## 2018-12-28 PROCEDURE — 86900 BLOOD TYPING SEROLOGIC ABO: CPT

## 2018-12-28 PROCEDURE — 85018 HEMOGLOBIN: CPT | Performed by: FAMILY MEDICINE

## 2018-12-28 PROCEDURE — 93306 TTE W/DOPPLER COMPLETE: CPT | Performed by: INTERNAL MEDICINE

## 2018-12-28 PROCEDURE — 86901 BLOOD TYPING SEROLOGIC RH(D): CPT

## 2018-12-28 PROCEDURE — 93005 ELECTROCARDIOGRAM TRACING: CPT | Performed by: INTERNAL MEDICINE

## 2018-12-28 PROCEDURE — A9540 TC99M MAA: HCPCS | Performed by: FAMILY MEDICINE

## 2018-12-28 PROCEDURE — 93005 ELECTROCARDIOGRAM TRACING: CPT | Performed by: NURSE PRACTITIONER

## 2018-12-28 PROCEDURE — 36415 COLL VENOUS BLD VENIPUNCTURE: CPT | Performed by: EMERGENCY MEDICINE

## 2018-12-28 PROCEDURE — 84484 ASSAY OF TROPONIN QUANT: CPT | Performed by: EMERGENCY MEDICINE

## 2018-12-28 PROCEDURE — 86850 RBC ANTIBODY SCREEN: CPT | Performed by: FAMILY MEDICINE

## 2018-12-28 PROCEDURE — 83735 ASSAY OF MAGNESIUM: CPT | Performed by: EMERGENCY MEDICINE

## 2018-12-28 PROCEDURE — 93306 TTE W/DOPPLER COMPLETE: CPT

## 2018-12-28 PROCEDURE — 84154 ASSAY OF PSA FREE: CPT | Performed by: FAMILY MEDICINE

## 2018-12-28 PROCEDURE — A9539 TC99M PENTETATE: HCPCS | Performed by: FAMILY MEDICINE

## 2018-12-28 PROCEDURE — 85014 HEMATOCRIT: CPT | Performed by: FAMILY MEDICINE

## 2018-12-28 PROCEDURE — 82962 GLUCOSE BLOOD TEST: CPT

## 2018-12-28 PROCEDURE — 86923 COMPATIBILITY TEST ELECTRIC: CPT

## 2018-12-28 PROCEDURE — 78582 LUNG VENTILAT&PERFUS IMAGING: CPT

## 2018-12-28 PROCEDURE — 86900 BLOOD TYPING SEROLOGIC ABO: CPT | Performed by: FAMILY MEDICINE

## 2018-12-28 PROCEDURE — 99232 SBSQ HOSP IP/OBS MODERATE 35: CPT | Performed by: INTERNAL MEDICINE

## 2018-12-28 PROCEDURE — 63710000001 INSULIN DETEMIR PER 5 UNITS: Performed by: FAMILY MEDICINE

## 2018-12-28 PROCEDURE — 93010 ELECTROCARDIOGRAM REPORT: CPT | Performed by: INTERNAL MEDICINE

## 2018-12-28 PROCEDURE — 84153 ASSAY OF PSA TOTAL: CPT | Performed by: FAMILY MEDICINE

## 2018-12-28 PROCEDURE — 85025 COMPLETE CBC W/AUTO DIFF WBC: CPT | Performed by: EMERGENCY MEDICINE

## 2018-12-28 RX ORDER — CALCITRIOL 0.25 UG/1
0.5 CAPSULE, LIQUID FILLED ORAL DAILY
Status: DISCONTINUED | OUTPATIENT
Start: 2018-12-28 | End: 2018-12-31 | Stop reason: HOSPADM

## 2018-12-28 RX ORDER — SODIUM CHLORIDE 0.9 % (FLUSH) 0.9 %
3-10 SYRINGE (ML) INJECTION AS NEEDED
Status: DISCONTINUED | OUTPATIENT
Start: 2018-12-28 | End: 2018-12-31 | Stop reason: HOSPADM

## 2018-12-28 RX ORDER — ASPIRIN 81 MG/1
81 TABLET ORAL DAILY
Status: DISCONTINUED | OUTPATIENT
Start: 2018-12-28 | End: 2018-12-31 | Stop reason: HOSPADM

## 2018-12-28 RX ORDER — SEVELAMER HYDROCHLORIDE 800 MG/1
800 TABLET, FILM COATED ORAL
Status: DISCONTINUED | OUTPATIENT
Start: 2018-12-28 | End: 2018-12-31 | Stop reason: HOSPADM

## 2018-12-28 RX ORDER — ATORVASTATIN CALCIUM 40 MG/1
80 TABLET, FILM COATED ORAL NIGHTLY
Status: DISCONTINUED | OUTPATIENT
Start: 2018-12-28 | End: 2018-12-31 | Stop reason: HOSPADM

## 2018-12-28 RX ORDER — MORPHINE SULFATE 2 MG/ML
2 INJECTION, SOLUTION INTRAMUSCULAR; INTRAVENOUS
Status: DISCONTINUED | OUTPATIENT
Start: 2018-12-28 | End: 2018-12-31 | Stop reason: HOSPADM

## 2018-12-28 RX ORDER — NICOTINE POLACRILEX 4 MG
15 LOZENGE BUCCAL
Status: DISCONTINUED | OUTPATIENT
Start: 2018-12-28 | End: 2018-12-31 | Stop reason: HOSPADM

## 2018-12-28 RX ORDER — DEXTROSE MONOHYDRATE 25 G/50ML
25 INJECTION, SOLUTION INTRAVENOUS
Status: DISCONTINUED | OUTPATIENT
Start: 2018-12-28 | End: 2018-12-31 | Stop reason: HOSPADM

## 2018-12-28 RX ORDER — SODIUM CHLORIDE 0.9 % (FLUSH) 0.9 %
3 SYRINGE (ML) INJECTION EVERY 12 HOURS SCHEDULED
Status: DISCONTINUED | OUTPATIENT
Start: 2018-12-28 | End: 2018-12-31 | Stop reason: HOSPADM

## 2018-12-28 RX ADMIN — SODIUM CHLORIDE, PRESERVATIVE FREE 10 ML: 5 INJECTION INTRAVENOUS at 21:14

## 2018-12-28 RX ADMIN — SODIUM BICARBONATE 125 ML/HR: 84 INJECTION, SOLUTION INTRAVENOUS at 12:39

## 2018-12-28 RX ADMIN — KIT FOR THE PREPARATION OF TECHNETIUM TC 99M PENTETATE 1 DOSE: 20 INJECTION, POWDER, LYOPHILIZED, FOR SOLUTION INTRAVENOUS; RESPIRATORY (INHALATION) at 14:30

## 2018-12-28 RX ADMIN — INSULIN ASPART 4 UNITS: 100 INJECTION, SOLUTION INTRAVENOUS; SUBCUTANEOUS at 21:07

## 2018-12-28 RX ADMIN — DEXTROSE MONOHYDRATE 12.5 G: 500 INJECTION PARENTERAL at 06:00

## 2018-12-28 RX ADMIN — Medication 1 DOSE: at 14:52

## 2018-12-28 RX ADMIN — INSULIN DETEMIR 5 UNITS: 100 INJECTION, SOLUTION SUBCUTANEOUS at 17:37

## 2018-12-28 RX ADMIN — INSULIN DETEMIR 10 UNITS: 100 INJECTION, SOLUTION SUBCUTANEOUS at 21:06

## 2018-12-28 RX ADMIN — RENAGEL 800 MG: 800 TABLET ORAL at 17:42

## 2018-12-28 RX ADMIN — POTASSIUM CHLORIDE AND SODIUM CHLORIDE 250 ML/HR: 450; 150 INJECTION, SOLUTION INTRAVENOUS at 00:06

## 2018-12-28 RX ADMIN — METOPROLOL TARTRATE 12.5 MG: 25 TABLET, FILM COATED ORAL at 17:41

## 2018-12-28 RX ADMIN — ASPIRIN 81 MG: 81 TABLET, COATED ORAL at 17:46

## 2018-12-28 RX ADMIN — INSULIN ASPART 2 UNITS: 100 INJECTION, SOLUTION INTRAVENOUS; SUBCUTANEOUS at 17:39

## 2018-12-28 RX ADMIN — ATORVASTATIN CALCIUM 80 MG: 40 TABLET, FILM COATED ORAL at 21:05

## 2018-12-28 RX ADMIN — METOPROLOL TARTRATE 12.5 MG: 25 TABLET, FILM COATED ORAL at 21:05

## 2018-12-28 RX ADMIN — SODIUM BICARBONATE 125 ML/HR: 84 INJECTION, SOLUTION INTRAVENOUS at 22:13

## 2018-12-28 RX ADMIN — POTASSIUM CHLORIDE, DEXTROSE MONOHYDRATE AND SODIUM CHLORIDE 150 ML/HR: 150; 5; 450 INJECTION, SOLUTION INTRAVENOUS at 04:09

## 2018-12-28 RX ADMIN — CALCITRIOL 0.5 MCG: 0.25 CAPSULE ORAL at 17:40

## 2018-12-28 RX ADMIN — SODIUM CHLORIDE, PRESERVATIVE FREE 3 ML: 5 INJECTION INTRAVENOUS at 09:14

## 2018-12-28 RX ADMIN — SODIUM BICARBONATE 50 MEQ: 84 INJECTION INTRAVENOUS at 12:12

## 2018-12-28 RX ADMIN — NITROGLYCERIN 0.4 MG: 0.4 TABLET, ORALLY DISINTEGRATING SUBLINGUAL at 00:04

## 2018-12-29 LAB
ALBUMIN SERPL-MCNC: 3.5 G/DL (ref 3.4–4.8)
ALBUMIN/GLOB SERPL: 1.3 G/DL (ref 1.1–1.8)
ALP SERPL-CCNC: 74 U/L (ref 38–126)
ALT SERPL W P-5'-P-CCNC: 22 U/L (ref 21–72)
ANION GAP SERPL CALCULATED.3IONS-SCNC: 16 MMOL/L (ref 5–15)
APTT PPP: 31.6 SECONDS (ref 20–40.3)
APTT PPP: 43.9 SECONDS (ref 20–40.3)
APTT PPP: 66 SECONDS (ref 20–40.3)
AST SERPL-CCNC: 30 U/L (ref 17–59)
ATMOSPHERIC PRESS: ABNORMAL MMHG
BASE EXCESS BLDV CALC-SCNC: -6.2 MMOL/L (ref 0–2)
BASOPHILS # BLD AUTO: 0.03 10*3/MM3 (ref 0–0.2)
BASOPHILS # BLD AUTO: 0.03 10*3/MM3 (ref 0–0.2)
BASOPHILS # BLD MANUAL: 0.19 10*3/MM3 (ref 0–0.2)
BASOPHILS NFR BLD AUTO: 0.3 % (ref 0–2)
BASOPHILS NFR BLD AUTO: 0.3 % (ref 0–2)
BASOPHILS NFR BLD AUTO: 2 % (ref 0–2)
BDY SITE: ABNORMAL
BILIRUB SERPL-MCNC: 0.4 MG/DL (ref 0.2–1.3)
BUN BLD-MCNC: 87 MG/DL (ref 7–21)
BUN/CREAT SERPL: 12.4 (ref 7–25)
CALCIUM SPEC-SCNC: 7.5 MG/DL (ref 8.4–10.2)
CHLORIDE SERPL-SCNC: 102 MMOL/L (ref 95–110)
CO2 SERPL-SCNC: 14 MMOL/L (ref 22–31)
CREAT BLD-MCNC: 7.03 MG/DL (ref 0.7–1.3)
DEPRECATED RDW RBC AUTO: 44.4 FL (ref 35.1–43.9)
DEPRECATED RDW RBC AUTO: 44.8 FL (ref 35.1–43.9)
DEPRECATED RDW RBC AUTO: 45.6 FL (ref 35.1–43.9)
EOSINOPHIL # BLD AUTO: 0.06 10*3/MM3 (ref 0–0.7)
EOSINOPHIL # BLD AUTO: 0.14 10*3/MM3 (ref 0–0.7)
EOSINOPHIL NFR BLD AUTO: 0.5 % (ref 0–7)
EOSINOPHIL NFR BLD AUTO: 1.3 % (ref 0–7)
ERYTHROCYTE [DISTWIDTH] IN BLOOD BY AUTOMATED COUNT: 14.5 % (ref 11.5–14.5)
ERYTHROCYTE [DISTWIDTH] IN BLOOD BY AUTOMATED COUNT: 14.6 % (ref 11.5–14.5)
ERYTHROCYTE [DISTWIDTH] IN BLOOD BY AUTOMATED COUNT: 14.8 % (ref 11.5–14.5)
FERRITIN SERPL-MCNC: 50 NG/ML (ref 17.9–464)
GFR SERPL CREATININE-BSD FRML MDRD: 8 ML/MIN/1.73 (ref 49–113)
GFR SERPL CREATININE-BSD FRML MDRD: ABNORMAL ML/MIN/1.73 (ref 49–113)
GLOBULIN UR ELPH-MCNC: 2.6 GM/DL (ref 2.3–3.5)
GLUCOSE BLD-MCNC: 220 MG/DL (ref 60–100)
GLUCOSE BLDC GLUCOMTR-MCNC: 205 MG/DL (ref 70–130)
GLUCOSE BLDC GLUCOMTR-MCNC: 211 MG/DL (ref 70–130)
GLUCOSE BLDC GLUCOMTR-MCNC: 229 MG/DL (ref 70–130)
GLUCOSE BLDC GLUCOMTR-MCNC: 454 MG/DL (ref 70–130)
GLUCOSE BLDC GLUCOMTR-MCNC: 502 MG/DL (ref 70–130)
GLUCOSE BLDC GLUCOMTR-MCNC: 547 MG/DL (ref 70–130)
GLUCOSE BLDC GLUCOMTR-MCNC: 93 MG/DL (ref 70–130)
HCO3 BLDV-SCNC: 18.6 MMOL/L (ref 18–23)
HCT VFR BLD AUTO: 20.1 % (ref 39–49)
HCT VFR BLD AUTO: 20.6 % (ref 39–49)
HCT VFR BLD AUTO: 23.3 % (ref 39–49)
HGB BLD-MCNC: 7.1 G/DL (ref 13.7–17.3)
HGB BLD-MCNC: 7.2 G/DL (ref 13.7–17.3)
HGB BLD-MCNC: 8.2 G/DL (ref 13.7–17.3)
HOLD SPECIMEN: NORMAL
IMM GRANULOCYTES # BLD AUTO: 0.02 10*3/MM3 (ref 0–0.02)
IMM GRANULOCYTES # BLD AUTO: 0.02 10*3/MM3 (ref 0–0.02)
IMM GRANULOCYTES NFR BLD AUTO: 0.2 % (ref 0–0.5)
IMM GRANULOCYTES NFR BLD AUTO: 0.2 % (ref 0–0.5)
INR PPP: 1.03 (ref 0.8–1.2)
IRON 24H UR-MRATE: 47 MCG/DL (ref 49–181)
IRON SATN MFR SERPL: 16 % (ref 20–55)
LYMPHOCYTES # BLD AUTO: 1.24 10*3/MM3 (ref 0.6–4.2)
LYMPHOCYTES # BLD AUTO: 3.13 10*3/MM3 (ref 0.6–4.2)
LYMPHOCYTES # BLD MANUAL: 0.94 10*3/MM3 (ref 0.6–4.2)
LYMPHOCYTES NFR BLD AUTO: 10.5 % (ref 10–50)
LYMPHOCYTES NFR BLD AUTO: 29.5 % (ref 10–50)
LYMPHOCYTES NFR BLD MANUAL: 10 % (ref 0–12)
LYMPHOCYTES NFR BLD MANUAL: 10 % (ref 10–50)
MCH RBC QN AUTO: 28.9 PG (ref 26.5–34)
MCH RBC QN AUTO: 29.8 PG (ref 26.5–34)
MCH RBC QN AUTO: 29.9 PG (ref 26.5–34)
MCHC RBC AUTO-ENTMCNC: 34.5 G/DL (ref 31.5–36.3)
MCHC RBC AUTO-ENTMCNC: 35.2 G/DL (ref 31.5–36.3)
MCHC RBC AUTO-ENTMCNC: 35.8 G/DL (ref 31.5–36.3)
MCV RBC AUTO: 83.4 FL (ref 80–98)
MCV RBC AUTO: 83.7 FL (ref 80–98)
MCV RBC AUTO: 84.7 FL (ref 80–98)
MODALITY: ABNORMAL
MONOCYTES # BLD AUTO: 0.94 10*3/MM3 (ref 0–0.9)
MONOCYTES # BLD AUTO: 0.94 10*3/MM3 (ref 0–0.9)
MONOCYTES # BLD AUTO: 1.04 10*3/MM3 (ref 0–0.9)
MONOCYTES NFR BLD AUTO: 8 % (ref 0–12)
MONOCYTES NFR BLD AUTO: 9.8 % (ref 0–12)
NEUTROPHILS # BLD AUTO: 6.26 10*3/MM3 (ref 2–8.6)
NEUTROPHILS # BLD AUTO: 7.33 10*3/MM3 (ref 2–8.6)
NEUTROPHILS # BLD AUTO: 9.51 10*3/MM3 (ref 2–8.6)
NEUTROPHILS NFR BLD AUTO: 58.9 % (ref 37–80)
NEUTROPHILS NFR BLD AUTO: 80.5 % (ref 37–80)
NEUTROPHILS NFR BLD MANUAL: 78 % (ref 37–80)
PCO2 BLDV: 32.9 MM HG (ref 41–51)
PH BLDV: 7.36 PH UNITS (ref 7.29–7.37)
PLAT MORPH BLD: NORMAL
PLATELET # BLD AUTO: 168 10*3/MM3 (ref 150–450)
PLATELET # BLD AUTO: 177 10*3/MM3 (ref 150–450)
PLATELET # BLD AUTO: 204 10*3/MM3 (ref 150–450)
PMV BLD AUTO: 9.2 FL (ref 8–12)
PMV BLD AUTO: 9.8 FL (ref 8–12)
PMV BLD AUTO: 9.9 FL (ref 8–12)
PO2 BLDV: 114 MM HG (ref 27–53)
POTASSIUM BLD-SCNC: 4.4 MMOL/L (ref 3.5–5.1)
PROT SERPL-MCNC: 6.1 G/DL (ref 6.3–8.6)
PROTHROMBIN TIME: 13.3 SECONDS (ref 11.1–15.3)
RBC # BLD AUTO: 2.41 10*6/MM3 (ref 4.37–5.74)
RBC # BLD AUTO: 2.46 10*6/MM3 (ref 4.37–5.74)
RBC # BLD AUTO: 2.75 10*6/MM3 (ref 4.37–5.74)
RBC MORPH BLD: NORMAL
SODIUM BLD-SCNC: 132 MMOL/L (ref 137–145)
TIBC SERPL-MCNC: 291 MCG/DL (ref 261–462)
TROPONIN I SERPL-MCNC: 1.8 NG/ML
WBC MORPH BLD: NORMAL
WBC NRBC COR # BLD: 10.62 10*3/MM3 (ref 3.2–9.8)
WBC NRBC COR # BLD: 11.8 10*3/MM3 (ref 3.2–9.8)
WBC NRBC COR # BLD: 9.4 10*3/MM3 (ref 3.2–9.8)
WHOLE BLOOD HOLD SPECIMEN: NORMAL

## 2018-12-29 PROCEDURE — 82962 GLUCOSE BLOOD TEST: CPT

## 2018-12-29 PROCEDURE — 84484 ASSAY OF TROPONIN QUANT: CPT | Performed by: FAMILY MEDICINE

## 2018-12-29 PROCEDURE — 83550 IRON BINDING TEST: CPT | Performed by: FAMILY MEDICINE

## 2018-12-29 PROCEDURE — 85730 THROMBOPLASTIN TIME PARTIAL: CPT | Performed by: FAMILY MEDICINE

## 2018-12-29 PROCEDURE — 85007 BL SMEAR W/DIFF WBC COUNT: CPT | Performed by: FAMILY MEDICINE

## 2018-12-29 PROCEDURE — 63710000001 INSULIN ASPART PER 5 UNITS: Performed by: FAMILY MEDICINE

## 2018-12-29 PROCEDURE — 94799 UNLISTED PULMONARY SVC/PX: CPT

## 2018-12-29 PROCEDURE — 85025 COMPLETE CBC W/AUTO DIFF WBC: CPT | Performed by: FAMILY MEDICINE

## 2018-12-29 PROCEDURE — 63710000001 INSULIN DETEMIR PER 5 UNITS: Performed by: FAMILY MEDICINE

## 2018-12-29 PROCEDURE — 93010 ELECTROCARDIOGRAM REPORT: CPT | Performed by: INTERNAL MEDICINE

## 2018-12-29 PROCEDURE — 82803 BLOOD GASES ANY COMBINATION: CPT | Performed by: FAMILY MEDICINE

## 2018-12-29 PROCEDURE — 83540 ASSAY OF IRON: CPT | Performed by: FAMILY MEDICINE

## 2018-12-29 PROCEDURE — 63510000001 EPOETIN ALFA PER 1000 UNITS: Performed by: INTERNAL MEDICINE

## 2018-12-29 PROCEDURE — 82728 ASSAY OF FERRITIN: CPT | Performed by: FAMILY MEDICINE

## 2018-12-29 PROCEDURE — 85610 PROTHROMBIN TIME: CPT | Performed by: FAMILY MEDICINE

## 2018-12-29 PROCEDURE — 99232 SBSQ HOSP IP/OBS MODERATE 35: CPT | Performed by: INTERNAL MEDICINE

## 2018-12-29 PROCEDURE — 80053 COMPREHEN METABOLIC PANEL: CPT | Performed by: FAMILY MEDICINE

## 2018-12-29 PROCEDURE — 25010000002 HEPARIN (PORCINE) PER 1000 UNITS: Performed by: FAMILY MEDICINE

## 2018-12-29 PROCEDURE — 93005 ELECTROCARDIOGRAM TRACING: CPT | Performed by: INTERNAL MEDICINE

## 2018-12-29 RX ORDER — MINOXIDIL 2.5 MG/1
2.5 TABLET ORAL
Status: DISCONTINUED | OUTPATIENT
Start: 2018-12-29 | End: 2018-12-31 | Stop reason: HOSPADM

## 2018-12-29 RX ORDER — HEPARIN SODIUM 10000 [USP'U]/100ML
1200 INJECTION, SOLUTION INTRAVENOUS
Status: DISCONTINUED | OUTPATIENT
Start: 2018-12-29 | End: 2018-12-30

## 2018-12-29 RX ORDER — HYDRALAZINE HYDROCHLORIDE 50 MG/1
100 TABLET, FILM COATED ORAL 3 TIMES DAILY
Status: DISCONTINUED | OUTPATIENT
Start: 2018-12-29 | End: 2018-12-30

## 2018-12-29 RX ORDER — LABETALOL HYDROCHLORIDE 5 MG/ML
20 INJECTION, SOLUTION INTRAVENOUS EVERY 6 HOURS PRN
Status: DISCONTINUED | OUTPATIENT
Start: 2018-12-29 | End: 2018-12-31 | Stop reason: HOSPADM

## 2018-12-29 RX ORDER — HYDROCODONE BITARTRATE AND ACETAMINOPHEN 7.5; 325 MG/1; MG/1
1 TABLET ORAL EVERY 8 HOURS PRN
Status: DISCONTINUED | OUTPATIENT
Start: 2018-12-29 | End: 2018-12-31 | Stop reason: HOSPADM

## 2018-12-29 RX ORDER — HEPARIN SODIUM 5000 [USP'U]/ML
2000 INJECTION, SOLUTION INTRAVENOUS; SUBCUTANEOUS AS NEEDED
Status: DISCONTINUED | OUTPATIENT
Start: 2018-12-29 | End: 2018-12-30

## 2018-12-29 RX ORDER — HEPARIN SODIUM 5000 [USP'U]/ML
3000 INJECTION, SOLUTION INTRAVENOUS; SUBCUTANEOUS AS NEEDED
Status: DISCONTINUED | OUTPATIENT
Start: 2018-12-29 | End: 2018-12-30

## 2018-12-29 RX ADMIN — HEPARIN SODIUM 1000 UNITS/HR: 10000 INJECTION, SOLUTION INTRAVENOUS at 22:59

## 2018-12-29 RX ADMIN — MINOXIDIL 2.5 MG: 2.5 TABLET ORAL at 12:37

## 2018-12-29 RX ADMIN — METOPROLOL TARTRATE 12.5 MG: 25 TABLET, FILM COATED ORAL at 08:26

## 2018-12-29 RX ADMIN — SODIUM BICARBONATE 125 ML/HR: 84 INJECTION, SOLUTION INTRAVENOUS at 07:04

## 2018-12-29 RX ADMIN — RENAGEL 800 MG: 800 TABLET ORAL at 08:27

## 2018-12-29 RX ADMIN — INSULIN DETEMIR 20 UNITS: 100 INJECTION, SOLUTION SUBCUTANEOUS at 23:02

## 2018-12-29 RX ADMIN — SODIUM CHLORIDE, PRESERVATIVE FREE 3 ML: 5 INJECTION INTRAVENOUS at 08:34

## 2018-12-29 RX ADMIN — INSULIN ASPART 4 UNITS: 100 INJECTION, SOLUTION INTRAVENOUS; SUBCUTANEOUS at 12:36

## 2018-12-29 RX ADMIN — INSULIN ASPART 4 UNITS: 100 INJECTION, SOLUTION INTRAVENOUS; SUBCUTANEOUS at 16:50

## 2018-12-29 RX ADMIN — CALCITRIOL 0.5 MCG: 0.25 CAPSULE ORAL at 08:27

## 2018-12-29 RX ADMIN — RENAGEL 800 MG: 800 TABLET ORAL at 17:20

## 2018-12-29 RX ADMIN — EPOETIN ALFA 10000 UNITS: 10000 SOLUTION INTRAVENOUS; SUBCUTANEOUS at 14:34

## 2018-12-29 RX ADMIN — HEPARIN SODIUM 3000 UNITS: 5000 INJECTION INTRAVENOUS; SUBCUTANEOUS at 23:10

## 2018-12-29 RX ADMIN — HYDRALAZINE HYDROCHLORIDE 100 MG: 50 TABLET ORAL at 16:48

## 2018-12-29 RX ADMIN — ATORVASTATIN CALCIUM 80 MG: 40 TABLET, FILM COATED ORAL at 22:56

## 2018-12-29 RX ADMIN — ASPIRIN 81 MG: 81 TABLET, COATED ORAL at 08:27

## 2018-12-29 RX ADMIN — HEPARIN SODIUM 3000 UNITS: 5000 INJECTION INTRAVENOUS; SUBCUTANEOUS at 14:54

## 2018-12-29 RX ADMIN — METOPROLOL TARTRATE 25 MG: 25 TABLET ORAL at 22:55

## 2018-12-29 RX ADMIN — RENAGEL 800 MG: 800 TABLET ORAL at 12:38

## 2018-12-29 RX ADMIN — INSULIN ASPART 4 UNITS: 100 INJECTION, SOLUTION INTRAVENOUS; SUBCUTANEOUS at 22:56

## 2018-12-29 RX ADMIN — HYDRALAZINE HYDROCHLORIDE 100 MG: 50 TABLET ORAL at 10:59

## 2018-12-29 RX ADMIN — HYDRALAZINE HYDROCHLORIDE 100 MG: 50 TABLET ORAL at 22:55

## 2018-12-29 RX ADMIN — HEPARIN SODIUM 1000 UNITS/HR: 10000 INJECTION, SOLUTION INTRAVENOUS at 14:52

## 2018-12-29 RX ADMIN — SODIUM BICARBONATE 125 ML/HR: 84 INJECTION, SOLUTION INTRAVENOUS at 17:18

## 2018-12-29 RX ADMIN — LABETALOL HYDROCHLORIDE 20 MG: 5 INJECTION, SOLUTION INTRAVENOUS at 15:22

## 2018-12-30 LAB
ALBUMIN SERPL-MCNC: 3.4 G/DL (ref 3.4–4.8)
ALBUMIN/GLOB SERPL: 1.4 G/DL (ref 1.1–1.8)
ALP SERPL-CCNC: 64 U/L (ref 38–126)
ALT SERPL W P-5'-P-CCNC: 19 U/L (ref 21–72)
ANION GAP SERPL CALCULATED.3IONS-SCNC: 12 MMOL/L (ref 5–15)
APTT PPP: 61 SECONDS (ref 20–40.3)
AST SERPL-CCNC: 26 U/L (ref 17–59)
BILIRUB SERPL-MCNC: 0.4 MG/DL (ref 0.2–1.3)
BUN BLD-MCNC: 86 MG/DL (ref 7–21)
BUN/CREAT SERPL: 12.7 (ref 7–25)
CALCIUM SPEC-SCNC: 7.6 MG/DL (ref 8.4–10.2)
CHLORIDE SERPL-SCNC: 101 MMOL/L (ref 95–110)
CO2 SERPL-SCNC: 22 MMOL/L (ref 22–31)
CREAT BLD-MCNC: 6.79 MG/DL (ref 0.7–1.3)
GFR SERPL CREATININE-BSD FRML MDRD: 8 ML/MIN/1.73 (ref 49–113)
GFR SERPL CREATININE-BSD FRML MDRD: ABNORMAL ML/MIN/1.73 (ref 49–113)
GLOBULIN UR ELPH-MCNC: 2.5 GM/DL (ref 2.3–3.5)
GLUCOSE BLD-MCNC: 73 MG/DL (ref 60–100)
GLUCOSE BLDC GLUCOMTR-MCNC: 152 MG/DL (ref 70–130)
GLUCOSE BLDC GLUCOMTR-MCNC: 217 MG/DL (ref 70–130)
GLUCOSE BLDC GLUCOMTR-MCNC: 225 MG/DL (ref 70–130)
GLUCOSE BLDC GLUCOMTR-MCNC: 230 MG/DL (ref 70–130)
GLUCOSE BLDC GLUCOMTR-MCNC: 41 MG/DL (ref 70–130)
POTASSIUM BLD-SCNC: 3.9 MMOL/L (ref 3.5–5.1)
PROT SERPL-MCNC: 5.9 G/DL (ref 6.3–8.6)
PSA FREE MFR SERPL: 20 %
PSA FREE SERPL-MCNC: 0.28 NG/ML
PSA SERPL-MCNC: 1.4 NG/ML (ref 0–4)
SODIUM BLD-SCNC: 135 MMOL/L (ref 137–145)

## 2018-12-30 PROCEDURE — 63710000001 INSULIN ASPART PER 5 UNITS: Performed by: FAMILY MEDICINE

## 2018-12-30 PROCEDURE — 85730 THROMBOPLASTIN TIME PARTIAL: CPT | Performed by: FAMILY MEDICINE

## 2018-12-30 PROCEDURE — 25010000002 HEPARIN (PORCINE) PER 1000 UNITS: Performed by: INTERNAL MEDICINE

## 2018-12-30 PROCEDURE — 63710000001 INSULIN DETEMIR PER 5 UNITS: Performed by: FAMILY MEDICINE

## 2018-12-30 PROCEDURE — 99232 SBSQ HOSP IP/OBS MODERATE 35: CPT | Performed by: INTERNAL MEDICINE

## 2018-12-30 PROCEDURE — 25010000002 NA FERRIC GLUC CPLX PER 12.5 MG: Performed by: INTERNAL MEDICINE

## 2018-12-30 PROCEDURE — 80053 COMPREHEN METABOLIC PANEL: CPT | Performed by: NURSE PRACTITIONER

## 2018-12-30 PROCEDURE — 82962 GLUCOSE BLOOD TEST: CPT

## 2018-12-30 RX ORDER — HEPARIN SODIUM 5000 [USP'U]/ML
5000 INJECTION, SOLUTION INTRAVENOUS; SUBCUTANEOUS EVERY 8 HOURS SCHEDULED
Status: DISCONTINUED | OUTPATIENT
Start: 2018-12-30 | End: 2018-12-31 | Stop reason: HOSPADM

## 2018-12-30 RX ORDER — CLOPIDOGREL BISULFATE 75 MG/1
75 TABLET ORAL DAILY
Status: DISCONTINUED | OUTPATIENT
Start: 2018-12-30 | End: 2018-12-31 | Stop reason: HOSPADM

## 2018-12-30 RX ORDER — HYDRALAZINE HYDROCHLORIDE 50 MG/1
50 TABLET, FILM COATED ORAL 3 TIMES DAILY
Status: DISCONTINUED | OUTPATIENT
Start: 2018-12-30 | End: 2018-12-31 | Stop reason: HOSPADM

## 2018-12-30 RX ADMIN — INSULIN ASPART 4 UNITS: 100 INJECTION, SOLUTION INTRAVENOUS; SUBCUTANEOUS at 17:39

## 2018-12-30 RX ADMIN — INSULIN ASPART 4 UNITS: 100 INJECTION, SOLUTION INTRAVENOUS; SUBCUTANEOUS at 22:04

## 2018-12-30 RX ADMIN — HEPARIN SODIUM 5000 UNITS: 5000 INJECTION INTRAVENOUS; SUBCUTANEOUS at 13:06

## 2018-12-30 RX ADMIN — HYDROCODONE BITARTRATE AND ACETAMINOPHEN 1 TABLET: 7.5; 325 TABLET ORAL at 22:04

## 2018-12-30 RX ADMIN — ASPIRIN 81 MG: 81 TABLET, COATED ORAL at 07:44

## 2018-12-30 RX ADMIN — INSULIN DETEMIR 20 UNITS: 100 INJECTION, SOLUTION SUBCUTANEOUS at 22:05

## 2018-12-30 RX ADMIN — CALCITRIOL 0.5 MCG: 0.25 CAPSULE ORAL at 07:45

## 2018-12-30 RX ADMIN — METOPROLOL TARTRATE 25 MG: 25 TABLET ORAL at 22:03

## 2018-12-30 RX ADMIN — HYDRALAZINE HYDROCHLORIDE 100 MG: 50 TABLET ORAL at 07:44

## 2018-12-30 RX ADMIN — RENAGEL 800 MG: 800 TABLET ORAL at 17:38

## 2018-12-30 RX ADMIN — METOPROLOL TARTRATE 25 MG: 25 TABLET ORAL at 07:44

## 2018-12-30 RX ADMIN — HYDROCODONE BITARTRATE AND ACETAMINOPHEN 1 TABLET: 7.5; 325 TABLET ORAL at 00:12

## 2018-12-30 RX ADMIN — SODIUM CHLORIDE 125 MG: 9 INJECTION, SOLUTION INTRAVENOUS at 15:17

## 2018-12-30 RX ADMIN — SODIUM BICARBONATE 125 ML/HR: 84 INJECTION, SOLUTION INTRAVENOUS at 02:14

## 2018-12-30 RX ADMIN — HEPARIN SODIUM 5000 UNITS: 5000 INJECTION INTRAVENOUS; SUBCUTANEOUS at 22:04

## 2018-12-30 RX ADMIN — ATORVASTATIN CALCIUM 80 MG: 40 TABLET, FILM COATED ORAL at 22:03

## 2018-12-30 RX ADMIN — RENAGEL 800 MG: 800 TABLET ORAL at 07:43

## 2018-12-30 RX ADMIN — MINOXIDIL 2.5 MG: 2.5 TABLET ORAL at 07:44

## 2018-12-30 RX ADMIN — CLOPIDOGREL BISULFATE 75 MG: 75 TABLET ORAL at 13:03

## 2018-12-30 RX ADMIN — RENAGEL 800 MG: 800 TABLET ORAL at 13:03

## 2018-12-31 VITALS
HEART RATE: 77 BPM | WEIGHT: 171.74 LBS | TEMPERATURE: 97.3 F | DIASTOLIC BLOOD PRESSURE: 65 MMHG | RESPIRATION RATE: 18 BRPM | SYSTOLIC BLOOD PRESSURE: 140 MMHG | HEIGHT: 70 IN | OXYGEN SATURATION: 90 % | BODY MASS INDEX: 24.59 KG/M2

## 2018-12-31 LAB
ALBUMIN SERPL-MCNC: 3.3 G/DL (ref 3.4–4.8)
ALBUMIN/GLOB SERPL: 1.4 G/DL (ref 1.1–1.8)
ALP SERPL-CCNC: 63 U/L (ref 38–126)
ALT SERPL W P-5'-P-CCNC: 15 U/L (ref 21–72)
ANION GAP SERPL CALCULATED.3IONS-SCNC: 13 MMOL/L (ref 5–15)
AST SERPL-CCNC: 32 U/L (ref 17–59)
BASOPHILS # BLD AUTO: 0.02 10*3/MM3 (ref 0–0.2)
BASOPHILS NFR BLD AUTO: 0.2 % (ref 0–2)
BILIRUB SERPL-MCNC: 0.3 MG/DL (ref 0.2–1.3)
BUN BLD-MCNC: 79 MG/DL (ref 7–21)
BUN/CREAT SERPL: 13.1 (ref 7–25)
CALCIUM SPEC-SCNC: 7.7 MG/DL (ref 8.4–10.2)
CHLORIDE SERPL-SCNC: 101 MMOL/L (ref 95–110)
CO2 SERPL-SCNC: 23 MMOL/L (ref 22–31)
CREAT BLD-MCNC: 6.01 MG/DL (ref 0.7–1.3)
DEPRECATED RDW RBC AUTO: 47.1 FL (ref 35.1–43.9)
EOSINOPHIL # BLD AUTO: 0.05 10*3/MM3 (ref 0–0.7)
EOSINOPHIL NFR BLD AUTO: 0.5 % (ref 0–7)
ERYTHROCYTE [DISTWIDTH] IN BLOOD BY AUTOMATED COUNT: 15.1 % (ref 11.5–14.5)
GFR SERPL CREATININE-BSD FRML MDRD: 9 ML/MIN/1.73 (ref 49–113)
GFR SERPL CREATININE-BSD FRML MDRD: ABNORMAL ML/MIN/1.73 (ref 49–113)
GLOBULIN UR ELPH-MCNC: 2.3 GM/DL (ref 2.3–3.5)
GLUCOSE BLD-MCNC: 118 MG/DL (ref 60–100)
GLUCOSE BLDC GLUCOMTR-MCNC: 126 MG/DL (ref 70–130)
GLUCOSE BLDC GLUCOMTR-MCNC: 200 MG/DL (ref 70–130)
GLUCOSE BLDC GLUCOMTR-MCNC: 31 MG/DL (ref 70–130)
GLUCOSE BLDC GLUCOMTR-MCNC: 50 MG/DL (ref 70–130)
GLUCOSE BLDC GLUCOMTR-MCNC: 82 MG/DL (ref 70–130)
HCT VFR BLD AUTO: 21.8 % (ref 39–49)
HGB BLD-MCNC: 7.5 G/DL (ref 13.7–17.3)
IMM GRANULOCYTES # BLD AUTO: 0.05 10*3/MM3 (ref 0–0.02)
IMM GRANULOCYTES NFR BLD AUTO: 0.5 % (ref 0–0.5)
LYMPHOCYTES # BLD AUTO: 0.6 10*3/MM3 (ref 0.6–4.2)
LYMPHOCYTES NFR BLD AUTO: 6.1 % (ref 10–50)
MCH RBC QN AUTO: 29.6 PG (ref 26.5–34)
MCHC RBC AUTO-ENTMCNC: 34.4 G/DL (ref 31.5–36.3)
MCV RBC AUTO: 86.2 FL (ref 80–98)
MONOCYTES # BLD AUTO: 1.1 10*3/MM3 (ref 0–0.9)
MONOCYTES NFR BLD AUTO: 11.1 % (ref 0–12)
NEUTROPHILS # BLD AUTO: 8.09 10*3/MM3 (ref 2–8.6)
NEUTROPHILS NFR BLD AUTO: 81.6 % (ref 37–80)
PHOSPHATE SERPL-MCNC: 7 MG/DL (ref 2.4–4.4)
PLATELET # BLD AUTO: 175 10*3/MM3 (ref 150–450)
PMV BLD AUTO: 9.9 FL (ref 8–12)
POTASSIUM BLD-SCNC: 3.8 MMOL/L (ref 3.5–5.1)
PROT SERPL-MCNC: 5.6 G/DL (ref 6.3–8.6)
RBC # BLD AUTO: 2.53 10*6/MM3 (ref 4.37–5.74)
SODIUM BLD-SCNC: 137 MMOL/L (ref 137–145)
WBC NRBC COR # BLD: 9.91 10*3/MM3 (ref 3.2–9.8)

## 2018-12-31 PROCEDURE — 82962 GLUCOSE BLOOD TEST: CPT

## 2018-12-31 PROCEDURE — 85025 COMPLETE CBC W/AUTO DIFF WBC: CPT | Performed by: FAMILY MEDICINE

## 2018-12-31 PROCEDURE — 80053 COMPREHEN METABOLIC PANEL: CPT | Performed by: NURSE PRACTITIONER

## 2018-12-31 PROCEDURE — 84100 ASSAY OF PHOSPHORUS: CPT | Performed by: INTERNAL MEDICINE

## 2018-12-31 PROCEDURE — 25010000002 HEPARIN (PORCINE) PER 1000 UNITS: Performed by: INTERNAL MEDICINE

## 2018-12-31 PROCEDURE — 25010000002 NA FERRIC GLUC CPLX PER 12.5 MG: Performed by: INTERNAL MEDICINE

## 2018-12-31 PROCEDURE — 63710000001 INSULIN ASPART PER 5 UNITS: Performed by: FAMILY MEDICINE

## 2018-12-31 RX ORDER — ATORVASTATIN CALCIUM 80 MG/1
80 TABLET, FILM COATED ORAL NIGHTLY
Qty: 30 TABLET | Refills: 0 | Status: SHIPPED | OUTPATIENT
Start: 2018-12-31

## 2018-12-31 RX ORDER — NITROGLYCERIN 0.4 MG/1
0.4 TABLET SUBLINGUAL
Qty: 100 TABLET | Refills: 12 | Status: SHIPPED | OUTPATIENT
Start: 2018-12-31

## 2018-12-31 RX ORDER — SEVELAMER HYDROCHLORIDE 800 MG/1
800 TABLET, FILM COATED ORAL
Qty: 90 TABLET | Refills: 0 | Status: ON HOLD | OUTPATIENT
Start: 2018-12-31 | End: 2020-05-28

## 2018-12-31 RX ORDER — ASPIRIN 81 MG/1
81 TABLET ORAL DAILY
Qty: 30 TABLET | Refills: 0 | Status: SHIPPED | OUTPATIENT
Start: 2019-01-01 | End: 2020-05-05 | Stop reason: HOSPADM

## 2018-12-31 RX ORDER — CALCITRIOL 0.5 UG/1
0.5 CAPSULE, LIQUID FILLED ORAL DAILY
Qty: 30 CAPSULE | Refills: 0 | Status: SHIPPED | OUTPATIENT
Start: 2019-01-01

## 2018-12-31 RX ORDER — CLOPIDOGREL BISULFATE 75 MG/1
75 TABLET ORAL DAILY
Qty: 30 TABLET | Refills: 0 | Status: SHIPPED | OUTPATIENT
Start: 2019-01-01 | End: 2020-05-05 | Stop reason: HOSPADM

## 2018-12-31 RX ADMIN — INSULIN ASPART 4 UNITS: 100 INJECTION, SOLUTION INTRAVENOUS; SUBCUTANEOUS at 12:41

## 2018-12-31 RX ADMIN — ASPIRIN 81 MG: 81 TABLET, COATED ORAL at 08:05

## 2018-12-31 RX ADMIN — SODIUM CHLORIDE 125 MG: 9 INJECTION, SOLUTION INTRAVENOUS at 08:04

## 2018-12-31 RX ADMIN — RENAGEL 800 MG: 800 TABLET ORAL at 12:41

## 2018-12-31 RX ADMIN — CALCITRIOL 0.5 MCG: 0.25 CAPSULE ORAL at 08:05

## 2018-12-31 RX ADMIN — METOPROLOL TARTRATE 25 MG: 25 TABLET ORAL at 08:05

## 2018-12-31 RX ADMIN — MINOXIDIL 2.5 MG: 2.5 TABLET ORAL at 08:05

## 2018-12-31 RX ADMIN — SODIUM CHLORIDE, PRESERVATIVE FREE 10 ML: 5 INJECTION INTRAVENOUS at 08:05

## 2018-12-31 RX ADMIN — CLOPIDOGREL BISULFATE 75 MG: 75 TABLET ORAL at 08:05

## 2018-12-31 RX ADMIN — HEPARIN SODIUM 5000 UNITS: 5000 INJECTION INTRAVENOUS; SUBCUTANEOUS at 06:49

## 2018-12-31 RX ADMIN — SODIUM CHLORIDE, PRESERVATIVE FREE 10 ML: 5 INJECTION INTRAVENOUS at 08:04

## 2018-12-31 RX ADMIN — HYDRALAZINE HYDROCHLORIDE 50 MG: 50 TABLET ORAL at 08:05

## 2018-12-31 RX ADMIN — RENAGEL 800 MG: 800 TABLET ORAL at 08:05

## 2019-01-01 ENCOUNTER — READMISSION MANAGEMENT (OUTPATIENT)
Dept: CALL CENTER | Facility: HOSPITAL | Age: 64
End: 2019-01-01

## 2019-01-01 LAB
ABO + RH BLD: NORMAL
BACTERIA SPEC AEROBE CULT: NORMAL
BACTERIA SPEC AEROBE CULT: NORMAL
BH BB BLOOD EXPIRATION DATE: NORMAL
BH BB BLOOD TYPE BARCODE: 9500
BH BB DISPENSE STATUS: NORMAL
BH BB PRODUCT CODE: NORMAL
BH BB UNIT NUMBER: NORMAL
UNIT  ABO: NORMAL
UNIT  RH: NORMAL

## 2019-01-01 NOTE — OUTREACH NOTE
Prep Survey      Responses   Facility patient discharged from?  [facility]   Is patient eligible?  Yes   Discharge diagnosis  BRODY,  DKA,  CP   Does the patient have one of the following disease processes/diagnoses(primary or secondary)?  Other   Does the patient have Home health ordered?  No   Is there a DME ordered?  No   Comments regarding appointments  call for apmts - see AVS   Prep survey completed?  Yes          Jaleesa Tovar RN

## 2019-01-02 ENCOUNTER — READMISSION MANAGEMENT (OUTPATIENT)
Dept: CALL CENTER | Facility: HOSPITAL | Age: 64
End: 2019-01-02

## 2019-01-02 NOTE — OUTREACH NOTE
Medical Week 1 Survey      Responses   Facility patient discharged from?  [facility]   Does the patient have one of the following disease processes/diagnoses(primary or secondary)?  Other   Is there a successful TCM telephone encounter documented?  No   Week 1 attempt successful?  Yes   Call start time  1444   Call end time  1451   Discharge diagnosis  BRODY,  DKA,  CP   List who call center can speak with  wife, Sumi   Person spoke with today (if not patient) and relationship  Sumi   Meds reviewed with patient/caregiver?  Yes   Is the patient having any side effects they believe may be caused by any medication additions or changes?  No   Does the patient have all medications ordered at discharge?  Yes   Is the patient taking all medications as directed (includes completed medication regime)?  Yes   Does the patient have a primary care provider?   Yes   Does the patient have an appointment with their PCP within 7 days of discharge?  Yes   Has the patient kept scheduled appointments due by today?  Yes   Comments  PCP this am 01/02/2019   Has home health visited the patient within 72 hours of discharge?  N/A   Has all DME been delivered?  No   Psychosocial issues?  No   Comments  Wife says he is doing good.   Did the patient receive a copy of their discharge instructions?  Yes   Nursing interventions  Reviewed instructions with patient, Educated on MyChart [Does not have a computer]   What is the patient's perception of their health status since discharge?  Improving   Is the patient/caregiver able to teach back signs and symptoms related to disease process for when to call PCP?  Yes   Is the patient/caregiver able to teach back signs and symptoms related to disease process for when to call 911?  Yes   Is the patient/caregiver able to teach back the hierarchy of who to call/visit for symptoms/problems? PCP, Specialist, Home health nurse, Urgent Care, ED, 911  Yes   Additional teach back comments  Has not started  dialysis yet.  Goes back to surgeon that placed his fistula next week.   Week 1 call completed?  Yes          Natasha Stephen RN

## 2019-01-03 ENCOUNTER — APPOINTMENT (OUTPATIENT)
Dept: ONCOLOGY | Facility: HOSPITAL | Age: 64
End: 2019-01-03

## 2019-01-03 ENCOUNTER — APPOINTMENT (OUTPATIENT)
Dept: ONCOLOGY | Facility: CLINIC | Age: 64
End: 2019-01-03

## 2019-01-09 ENCOUNTER — READMISSION MANAGEMENT (OUTPATIENT)
Dept: CALL CENTER | Facility: HOSPITAL | Age: 64
End: 2019-01-09

## 2019-01-09 NOTE — OUTREACH NOTE
Medical Week 2 Survey      Responses   Facility patient discharged from?  [facility]   Does the patient have one of the following disease processes/diagnoses(primary or secondary)?  Other   Week 2 attempt successful?  Yes   Call start time  1237   Discharge diagnosis  BRODY,  DKA,  CP   Call end time  1241   Is patient permission given to speak with other caregiver?  Yes   List who call center can speak with  wife, Sumi   Person spoke with today (if not patient) and relationship  Sumi   Meds reviewed with patient/caregiver?  Yes   Is the patient taking all medications as directed (includes completed medication regime)?  Yes   Does the patient have an appointment with their PCP within 7 days of discharge?  Greater than 7 days   What is preventing the patient from scheduling follow up appointments within 7 days of discharge?  Earlier appointment not available   Nursing Interventions  Educated patient on importance of making appointment   Has the patient kept scheduled appointments due by today?  N/A   Psychosocial issues?  No   Comments  Pt has cold and congestion.    What is the patient's perception of their health status since discharge?  New symptoms unrelated to diagnosis   Is the patient/caregiver able to teach back signs and symptoms related to disease process for when to call PCP?  Yes   Is the patient/caregiver able to teach back signs and symptoms related to disease process for when to call 911?  Yes   Additional teach back comments  Has not started dialysis yet. Pt has to have a new fistula placed.    Week 2 Call Completed?  Yes          Maryjane Benitez RN

## 2019-01-16 ENCOUNTER — READMISSION MANAGEMENT (OUTPATIENT)
Dept: CALL CENTER | Facility: HOSPITAL | Age: 64
End: 2019-01-16

## 2019-01-24 ENCOUNTER — READMISSION MANAGEMENT (OUTPATIENT)
Dept: CALL CENTER | Facility: HOSPITAL | Age: 64
End: 2019-01-24

## 2019-01-24 NOTE — OUTREACH NOTE
"Medical Week 4 Survey      Responses   Facility patient discharged from?  [facility]   Does the patient have one of the following disease processes/diagnoses(primary or secondary)?  Other   Week 4 attempt successful?  Yes   Call start time  1123   Call end time  1125   Discharge diagnosis  BRODY,  DKA,  CP   Person spoke with today (if not patient) and relationship  Sumi   Meds reviewed with patient/caregiver?  Yes   Is the patient taking all medications as directed (includes completed medication regime)?  Yes   Has the patient kept scheduled appointments due by today?  Yes   Is the patient still receiving Home Health Services?  N/A   What is the patient's perception of their health status since discharge?  Improving   Additional teach back comments  Wife says pt is \"OK\" Has not started dialysis. Pt unavailable for call   Week 4 Call Completed?  Yes   Would the patient like one additional call?  No   Graduated  Yes          Barbara Fletcher RN  "

## 2020-05-05 ENCOUNTER — OFFICE VISIT (OUTPATIENT)
Dept: CARDIAC SURGERY | Facility: CLINIC | Age: 65
End: 2020-05-05

## 2020-05-05 VITALS
HEART RATE: 84 BPM | BODY MASS INDEX: 23.82 KG/M2 | DIASTOLIC BLOOD PRESSURE: 68 MMHG | HEIGHT: 70 IN | SYSTOLIC BLOOD PRESSURE: 142 MMHG | WEIGHT: 166.4 LBS | OXYGEN SATURATION: 97 %

## 2020-05-05 DIAGNOSIS — E11.52 TYPE 2 DIABETES MELLITUS WITH FOOT ULCER AND GANGRENE (HCC): ICD-10-CM

## 2020-05-05 DIAGNOSIS — L97.509 TYPE 2 DIABETES MELLITUS WITH FOOT ULCER AND GANGRENE (HCC): ICD-10-CM

## 2020-05-05 DIAGNOSIS — I96 GANGRENE OF TOE OF RIGHT FOOT (HCC): ICD-10-CM

## 2020-05-05 DIAGNOSIS — E11.621 TYPE 2 DIABETES MELLITUS WITH FOOT ULCER AND GANGRENE (HCC): ICD-10-CM

## 2020-05-05 DIAGNOSIS — F17.200 TOBACCO DEPENDENCE: ICD-10-CM

## 2020-05-05 DIAGNOSIS — I77.1 STRICTURE OF ARTERY (HCC): ICD-10-CM

## 2020-05-05 DIAGNOSIS — N18.5 CKD (CHRONIC KIDNEY DISEASE) STAGE 5, GFR LESS THAN 15 ML/MIN (HCC): Chronic | ICD-10-CM

## 2020-05-05 DIAGNOSIS — I99.8 ISCHEMIA OF TOE: Primary | ICD-10-CM

## 2020-05-05 DIAGNOSIS — I70.231 ATHEROSCLEROSIS OF NATIVE ARTERY OF RIGHT LOWER EXTREMITY WITH ULCERATION OF THIGH (HCC): ICD-10-CM

## 2020-05-05 PROBLEM — E78.5 HYPERLIPIDEMIA: Status: ACTIVE | Noted: 2020-05-05

## 2020-05-05 PROBLEM — Z99.2 END-STAGE RENAL DISEASE ON HEMODIALYSIS (HCC): Status: ACTIVE | Noted: 2019-02-26

## 2020-05-05 PROBLEM — K21.9 GASTROESOPHAGEAL REFLUX DISEASE: Status: ACTIVE | Noted: 2020-05-05

## 2020-05-05 PROBLEM — N18.6 END-STAGE RENAL DISEASE ON HEMODIALYSIS (HCC): Status: ACTIVE | Noted: 2019-02-26

## 2020-05-05 PROBLEM — I10 BENIGN ESSENTIAL HYPERTENSION: Status: ACTIVE | Noted: 2020-05-05

## 2020-05-05 PROCEDURE — 99203 OFFICE O/P NEW LOW 30 MIN: CPT | Performed by: NURSE PRACTITIONER

## 2020-05-05 RX ORDER — LEVOFLOXACIN 500 MG/1
500 TABLET, FILM COATED ORAL DAILY
Status: ON HOLD | COMMUNITY
End: 2020-05-14

## 2020-05-05 RX ORDER — OMEPRAZOLE 20 MG/1
20 CAPSULE, DELAYED RELEASE ORAL DAILY
COMMUNITY

## 2020-05-05 RX ORDER — EPLERENONE 50 MG/1
50 TABLET, FILM COATED ORAL DAILY
Status: ON HOLD | COMMUNITY
Start: 2020-05-04 | End: 2020-05-28

## 2020-05-05 RX ORDER — INSULIN LISPRO 100 [IU]/ML
10 INJECTION, SOLUTION INTRAVENOUS; SUBCUTANEOUS 3 TIMES DAILY
Status: ON HOLD | COMMUNITY
End: 2020-05-14

## 2020-05-05 RX ORDER — CARVEDILOL 12.5 MG/1
12.5 TABLET ORAL 2 TIMES DAILY WITH MEALS
COMMUNITY

## 2020-05-05 NOTE — PROGRESS NOTES
CVTS Office Progress Note       Subjective   Patient ID: Darwin Morrell is a 65 y.o. male is being seen for consultation today at the request of Dottie HARRIS.    Chief Complaint:    Chief Complaint   Patient presents with   • Peripheral Vascular Disease       The following portions of the patient's history were reviewed and updated as appropriate: allergies, current medications, past family history, past medical history, past social history, past surgical history and problem list.  Recent images independently reviewed.  Available laboratory values reviewed.    PCP:  Morgan Tovar MD   Nephrology: [facility] [place] M[facility]     65 y.o. male with HTN(stable, increased risk stroke, rupture), Hyperlipidemia(stable, increased risk cardiovascular events), Diabetes Mellitus(stable, increased risk cardiovascular events), Smoker(uncontrolled, increased risk cardiovascular events) and Chronic Kidney Disease stage 5(stable, on dialysis)  Diabetic Ulcer RIGHT great toe. Callus x 6 weeks, now with dry gangrene. Has been soaking (epsom salts) at home and applying antibiotic cream.  smokes 1 PPD.  No TIA stroke amaurosis.  No MI claudication. No other associated signs, symptoms or modifying factors. Urgent vascular consultation requested.    5/5/2020:  TOMMY: RIGHT 0.5 Biphasic (CFV-DP) LEFT 0.78 Tri/Biphasic (DP/PT)      Past Medical History:   Diagnosis Date   • ESRD (end stage renal disease) (CMS/[facility])    • GERD (gastroesophageal reflux disease)    • Hyperlipidemia    • Hypertension    • Smoker      Past Surgical History:   Procedure Laterality Date   • APPENDECTOMY     • ARTERIOVENOUS FISTULA Right 02/2019   • CAROTID ENDARTERECTOMY Right      History reviewed. No pertinent family history.  Social History     Tobacco Use   • Smoking status: Current Every Day Smoker     Packs/day: 1.00     Years: 55.00     Pack years: 55.00   • Smokeless tobacco: Current User   Substance Use Topics   • Alcohol use: Not on file   •  Drug use: No       ALLERGIES:   Patient has no known allergies.    MEDICATIONS:      Current Outpatient Medications:   •  atorvastatin (LIPITOR) 80 MG tablet, Take 1 tablet by mouth Every Night., Disp: 30 tablet, Rfl: 0  •  calcitriol (ROCALTROL) 0.5 MCG capsule, Take 1 capsule by mouth Daily., Disp: 30 capsule, Rfl: 0  •  carvedilol (COREG) 12.5 MG tablet, Take 12.5 mg by mouth 2 (Two) Times a Day With Meals., Disp: , Rfl:   •  eplerenone (INSPRA) 50 MG tablet, , Disp: , Rfl:   •  HYDROcodone-acetaminophen (NORCO) 7.5-325 MG per tablet, Take 1 tablet by mouth Every 8 (Eight) Hours As Needed for Moderate Pain ., Disp: , Rfl:   •  insulin detemir (LEVEMIR) 100 UNIT/ML injection, Inject 20 Units under the skin into the appropriate area as directed Every Night., Disp: 10 mL, Rfl: 0  •  Insulin Lispro, 1 Unit Dial, (HumaLOG KwikPen) 100 UNIT/ML solution pen-injector, Every 12 (Twelve) Hours., Disp: , Rfl:   •  losartan (COZAAR) 25 MG tablet, Take 25 mg by mouth Daily., Disp: , Rfl:   •  metoprolol tartrate (LOPRESSOR) 25 MG tablet, Take 1 tablet by mouth Every 12 (Twelve) Hours., Disp: 60 tablet, Rfl: 0  •  minoxidil (LONITEN) 2.5 MG tablet, Take 2.5 mg by mouth Daily., Disp: , Rfl:   •  omeprazole (priLOSEC) 20 MG capsule, Take 20 mg by mouth Daily., Disp: , Rfl:   •  Patiromer Sorbitex Calcium (VELTASSA) 8.4 g pack, Take 8.4 g by mouth 2 (Two) Times a Week. 2-3 TIMES WEEKLY, Disp: , Rfl:   •  sevelamer (RENAGEL) 800 MG tablet, Take 1 tablet by mouth 3 (Three) Times a Day With Meals., Disp: 90 tablet, Rfl: 0  •  torsemide (DEMADEX) 20 MG tablet, Take 20 mg by mouth 2 (Two) Times a Day., Disp: , Rfl:   •  nitroglycerin (NITROSTAT) 0.4 MG SL tablet, Place 1 tablet under the tongue Every 5 (Five) Minutes As Needed for Chest Pain. Take no more than 3 doses in 15 minutes., Disp: 100 tablet, Rfl: 12    Review of Systems   Constitution: Negative for fever.   Eyes: Negative for visual disturbance.   Cardiovascular: Negative  "for claudication, cyanosis and leg swelling.   Respiratory: Negative for shortness of breath.    Skin: Positive for color change, dry skin, nail changes and poor wound healing.   Musculoskeletal: Positive for arthritis. Negative for muscle weakness.   Gastrointestinal: Negative for dysphagia.   Neurological: Negative for focal weakness, light-headedness, loss of balance, numbness, paresthesias and weakness.   Psychiatric/Behavioral: Negative for altered mental status.   All other systems reviewed and are negative.       Objective   Vitals:    05/05/20 1352   BP: 142/68   BP Location: Left arm   Pulse: 84   SpO2: 97%   Weight: 75.5 kg (166 lb 6.4 oz)   Height: 177.8 cm (70\")     Body mass index is 23.88 kg/m².  Physical Exam   Constitutional: He is oriented to person, place, and time. He appears well-nourished.   HENT:   Head: Atraumatic.   Eyes: EOM are normal.   Neck: Neck supple.   Cardiovascular: Normal rate and normal heart sounds.   Pulses:       Dorsalis pedis pulses are 1+ on the right side, and 1+ on the left side.        Posterior tibial pulses are 1+ on the right side, and 1+ on the left side.   (R) AV Fistula: +thrill/bruit   Pulmonary/Chest: Effort normal and breath sounds normal.   Abdominal: Soft. Bowel sounds are normal.   Musculoskeletal: Normal range of motion. He exhibits no edema.   Neurological: He is alert and oriented to person, place, and time.   Skin: Skin is warm. Lesion noted.   RIGHT great toe black with eschar exposed bone   Psychiatric: He has a normal mood and affect. Thought content normal.   Vitals reviewed.        Assessment & Plan     Independent Review of Radiographic Studies:  Detailed discussion regarding risks, benefits, and treatment plan. Images independently reviewed. Patient understands, agrees, and wishes to proceed with plan.     1. Ischemia of toe  - Doppler Ankle Brachial Index Single Level CAR; Future  - CT Angio Abdominal Aorta Bilateral Iliofem Runoff; Future    2. " Stricture of artery (CMS/HCC)   - Doppler Ankle Brachial Index Single Level CAR; Future    3. 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 Gustafson-Monday 5/11/2020  Medical Management: STATIN   Referral to Podiatry  - CT Angio Abdominal Aorta Bilateral Iliofem Runoff; Future    4. Gangrene of toe of right foot (CMS/HCC)  Levaquin ordered per Dalia HARRIS  - Ambulatory Referral to Podiatry    5. Tobacco dependence  Smoking cessation assistance options offered including behavioral counseling (Smoking Cessation Classes), Nicotine replacement therapy (patches or gum), pharmacologic therapy (Chantix, Wellbutrin). Understands tobacco increases risk of expanding AAA, MI, CVA, PAD, carcinoma. Discussion and question answer period 5-7 minutes.     6. CKD (chronic kidney disease) stage 5, GFR less than 15 ml/min (CMS/HCC)  Continue dialysis as scheduled. If problems should arise including difficulty with access, high pressure alarms, or inability to complete dialysis please notify [facility] immediately for evaluation.     7. Type 2 diabetes mellitus with foot ulcer and gangrene (CMS/HCC)              This document has been electronically signed by KIMBERLY Villalpando on May 5, 2020 15:18

## 2020-05-07 ENCOUNTER — HOSPITAL ENCOUNTER (OUTPATIENT)
Dept: CT IMAGING | Facility: HOSPITAL | Age: 65
Discharge: HOME | End: 2020-05-07
Admitting: NURSE PRACTITIONER

## 2020-05-07 DIAGNOSIS — I70.231 ATHEROSCLEROSIS OF NATIVE ARTERY OF RIGHT LOWER EXTREMITY WITH ULCERATION OF THIGH (HCC): ICD-10-CM

## 2020-05-07 DIAGNOSIS — I99.8 ISCHEMIA OF TOE: ICD-10-CM

## 2020-05-07 PROCEDURE — 75635 CT ANGIO ABDOMINAL ARTERIES: CPT

## 2020-05-07 PROCEDURE — 0 IOPAMIDOL PER 1 ML: Performed by: NURSE PRACTITIONER

## 2020-05-07 RX ADMIN — IOPAMIDOL 90 ML: 755 INJECTION, SOLUTION INTRAVENOUS at 11:40

## 2020-05-11 ENCOUNTER — OFFICE VISIT (OUTPATIENT)
Dept: PODIATRY | Facility: CLINIC | Age: 65
End: 2020-05-11

## 2020-05-11 ENCOUNTER — OFFICE VISIT (OUTPATIENT)
Dept: CARDIAC SURGERY | Facility: CLINIC | Age: 65
End: 2020-05-11

## 2020-05-11 VITALS
SYSTOLIC BLOOD PRESSURE: 150 MMHG | DIASTOLIC BLOOD PRESSURE: 68 MMHG | OXYGEN SATURATION: 99 % | BODY MASS INDEX: 23.77 KG/M2 | HEART RATE: 73 BPM | HEIGHT: 70 IN | WEIGHT: 166 LBS

## 2020-05-11 VITALS — HEIGHT: 70 IN | BODY MASS INDEX: 23.77 KG/M2 | WEIGHT: 166 LBS | HEART RATE: 77 BPM | OXYGEN SATURATION: 97 %

## 2020-05-11 DIAGNOSIS — Z99.2 END-STAGE RENAL DISEASE ON HEMODIALYSIS (HCC): ICD-10-CM

## 2020-05-11 DIAGNOSIS — E11.42 DIABETIC POLYNEUROPATHY ASSOCIATED WITH TYPE 2 DIABETES MELLITUS (HCC): Primary | ICD-10-CM

## 2020-05-11 DIAGNOSIS — E11.621 TYPE 2 DIABETES MELLITUS WITH FOOT ULCER AND GANGRENE (HCC): ICD-10-CM

## 2020-05-11 DIAGNOSIS — L97.509 TYPE 2 DIABETES MELLITUS WITH FOOT ULCER AND GANGRENE (HCC): ICD-10-CM

## 2020-05-11 DIAGNOSIS — N18.6 END-STAGE RENAL DISEASE ON HEMODIALYSIS (HCC): ICD-10-CM

## 2020-05-11 DIAGNOSIS — I10 BENIGN ESSENTIAL HYPERTENSION: ICD-10-CM

## 2020-05-11 DIAGNOSIS — E78.2 MIXED HYPERLIPIDEMIA: ICD-10-CM

## 2020-05-11 DIAGNOSIS — I73.9 PVD (PERIPHERAL VASCULAR DISEASE) (HCC): Primary | ICD-10-CM

## 2020-05-11 DIAGNOSIS — I73.9 PVD (PERIPHERAL VASCULAR DISEASE) (HCC): ICD-10-CM

## 2020-05-11 DIAGNOSIS — L84 CALLUS OF FOOT: ICD-10-CM

## 2020-05-11 DIAGNOSIS — E11.52 TYPE 2 DIABETES MELLITUS WITH FOOT ULCER AND GANGRENE (HCC): ICD-10-CM

## 2020-05-11 DIAGNOSIS — I96 GANGRENE OF TOE OF RIGHT FOOT (HCC): ICD-10-CM

## 2020-05-11 PROCEDURE — 99203 OFFICE O/P NEW LOW 30 MIN: CPT | Performed by: PODIATRIST

## 2020-05-11 PROCEDURE — 11056 PARNG/CUTG B9 HYPRKR LES 2-4: CPT | Performed by: PODIATRIST

## 2020-05-11 PROCEDURE — 99215 OFFICE O/P EST HI 40 MIN: CPT | Performed by: THORACIC SURGERY (CARDIOTHORACIC VASCULAR SURGERY)

## 2020-05-11 RX ORDER — SODIUM CHLORIDE 9 MG/ML
50 INJECTION, SOLUTION INTRAVENOUS CONTINUOUS
Status: CANCELLED | OUTPATIENT
Start: 2020-05-14

## 2020-05-11 RX ORDER — CALCIUM ACETATE 667 MG/1
1334 CAPSULE ORAL 3 TIMES DAILY
COMMUNITY
Start: 2020-05-08

## 2020-05-11 NOTE — PROGRESS NOTES
Darwin Morrell  1955  65 y.o. male   M. SHELIA Tovar - Pt has an appt on 5/12/2020  BS -  89 per pt    Patient presents today with a complaint of a dark toe right foot and callus on his left foot.    05/11/2020    Chief Complaint   Patient presents with   • Left Foot - gangrene toe   • Right Foot - Callouses       History of Present Illness    Darwin Morrell is a 65 y.o.male who presents to clinic today with chief complaint of wound to right great toe.  Wound started greater than 1 month ago and is progressively gotten worse.  There are no associated injuries or trauma to the area.  He does relate to numbness to his feet.  Patient is diabetic.  Most recent blood sugar was 89 mg/dL.  Patient is being followed by cardiovascular.  Scheduled appointment today with Dr. Gustafson.  Previously had TOMMY and CT angiogram.  He also complains of calluses to the left foot.  No wounds to left foot.  No other complaints today.    Past Medical History:   Diagnosis Date   • Callus    • Diabetes mellitus (CMS/HCC)    • ESRD (end stage renal disease) (CMS/[facility])    • GERD (gastroesophageal reflux disease)    • Hyperlipidemia    • Hypertension    • Smoker          Past Surgical History:   Procedure Laterality Date   • APPENDECTOMY     • ARTERIOVENOUS FISTULA Right 02/2019   • CAROTID ENDARTERECTOMY Right          Family History   Problem Relation Age of Onset   • Diabetes Mother    • Diabetes Father    • Diabetes Sister    • Cancer Brother    • Diabetes Brother        No Known Allergies    Social History     Socioeconomic History   • Marital status:      Spouse name: Not on file   • Number of children: Not on file   • Years of education: Not on file   • Highest education level: Not on file   Tobacco Use   • Smoking status: Current Every Day Smoker     Packs/day: 1.00     Years: 55.00     Pack years: 55.00   • Smokeless tobacco: Current User   Substance and Sexual Activity   • Drug use: No   • Sexual activity: Defer                  Current Outpatient Medications   Medication Sig Dispense Refill   • atorvastatin (LIPITOR) 80 MG tablet Take 1 tablet by mouth Every Night. 30 tablet 0   • calcium acetate (PHOS BINDER,) 667 MG capsule capsule      • carvedilol (COREG) 12.5 MG tablet Take 12.5 mg by mouth 2 (Two) Times a Day With Meals.     • eplerenone (INSPRA) 50 MG tablet      • HYDROcodone-acetaminophen (NORCO) 7.5-325 MG per tablet Take 1 tablet by mouth Every 8 (Eight) Hours As Needed for Moderate Pain .     • insulin detemir (LEVEMIR) 100 UNIT/ML injection Inject 20 Units under the skin into the appropriate area as directed Every Night. 10 mL 0   • Insulin Lispro, 1 Unit Dial, (HumaLOG KwikPen) 100 UNIT/ML solution pen-injector Every 12 (Twelve) Hours.     • losartan (COZAAR) 25 MG tablet Take 25 mg by mouth Daily.     • torsemide (DEMADEX) 20 MG tablet Take 20 mg by mouth 2 (Two) Times a Day.     • calcitriol (ROCALTROL) 0.5 MCG capsule Take 1 capsule by mouth Daily. 30 capsule 0   • levoFLOXacin (LEVAQUIN) 500 MG tablet Take 500 mg by mouth Daily.     • metoprolol tartrate (LOPRESSOR) 25 MG tablet Take 1 tablet by mouth Every 12 (Twelve) Hours. 60 tablet 0   • minoxidil (LONITEN) 2.5 MG tablet Take 2.5 mg by mouth Daily.     • nitroglycerin (NITROSTAT) 0.4 MG SL tablet Place 1 tablet under the tongue Every 5 (Five) Minutes As Needed for Chest Pain. Take no more than 3 doses in 15 minutes. 100 tablet 12   • omeprazole (priLOSEC) 20 MG capsule Take 20 mg by mouth Daily.     • Patiromer Sorbitex Calcium (VELTASSA) 8.4 g pack Take 8.4 g by mouth 2 (Two) Times a Week. 2-3 TIMES WEEKLY     • sevelamer (RENAGEL) 800 MG tablet Take 1 tablet by mouth 3 (Three) Times a Day With Meals. 90 tablet 0     No current facility-administered medications for this visit.        Review of Systems   Constitutional: Negative.    HENT: Negative.    Eyes: Positive for visual disturbance.   Respiratory: Negative.    Cardiovascular: Negative.   "  Gastrointestinal: Negative.    Musculoskeletal:        Foot pain   Skin: Positive for color change.   Allergic/Immunologic: Negative.    Psychiatric/Behavioral: Negative.          OBJECTIVE    Pulse 77   Ht 177.8 cm (70\")   Wt 75.3 kg (166 lb)   SpO2 97%   BMI 23.82 kg/m²       Physical Exam   Constitutional: He is oriented to person, place, and time. He appears well-developed and well-nourished. No distress.   HENT:   Head: Normocephalic and atraumatic.   Nose: Nose normal.   Eyes: Pupils are equal, round, and reactive to light. Conjunctivae and EOM are normal.   Pulmonary/Chest: Effort normal. No respiratory distress. He has no wheezes.   Musculoskeletal: Normal range of motion. He exhibits deformity. He exhibits no edema.   Neurological: He is alert and oriented to person, place, and time.   Psychiatric: He has a normal mood and affect. His behavior is normal. Thought content normal.   Vitals reviewed.      Lower Extremity    Cardiovascular:    DP/PT pulses non-palpable bilateral  Skin temp is warm to warm from proximal tibia to distal digits bilateral  Pedal hair growth present.   No erythema or edema noted   Musculoskeletal:  Muscle strength is 5/5 for all muscle groups tested   No pain on palpation noted bilateral  Dermatological:   Gangrene noted to right hallux.  Foul odor noted.  No purulent drainage.  Webspaces 1-4 bilateral are clean, dry and intact.   No subcutaneous nodules or masses noted    Hyperkeratotic tissue noted to medial IPJ of left hallux and plantar left foot.  Neurological:   Protective sensation diminished  Sensation intact to light touch        Procedures        ASSESSMENT AND PLAN    Darwin was seen today for gangrene toe and callouses.    Diagnoses and all orders for this visit:    Diabetic polyneuropathy associated with type 2 diabetes mellitus (CMS/HCC)    Gangrene of toe of right foot (CMS/HCC)    PVD (peripheral vascular disease) (CMS/HCC)    Callus of foot      - A diabetic " foot screening exam was performed and the patient was educated on the foot complications related to diabetes,  preventative foot care and what signs and symptoms to watch for.  Instructed to contact our office if any foot problems develop before next visit.  - Vascular studies reviewed  - Trimmed hyperkeratotic lesions noted in objective with a #15 blade without incident.   - Lengthy discussion held patient regarding treatment options for dry gangrene to right hallux.  Patient will require a hallux amputation in the near future.  Await for vascular surgery work-up.  Patient has appoint with Dr. Gustafson today.  -All of his questions were answered  - Recheck 1 week          This document has been electronically signed by David Murguia DPM on May 12, 2020 09:22     5/12/2020  09:22

## 2020-05-12 PROCEDURE — U0003 INFECTIOUS AGENT DETECTION BY NUCLEIC ACID (DNA OR RNA); SEVERE ACUTE RESPIRATORY SYNDROME CORONAVIRUS 2 (SARS-COV-2) (CORONAVIRUS DISEASE [COVID-19]), AMPLIFIED PROBE TECHNIQUE, MAKING USE OF HIGH THROUGHPUT TECHNOLOGIES AS DESCRIBED BY CMS-2020-01-R: HCPCS | Performed by: THORACIC SURGERY (CARDIOTHORACIC VASCULAR SURGERY)

## 2020-05-12 NOTE — H&P (VIEW-ONLY)
5/11/2020    Darwin Morrell  1955    Chief Complaint:  PVD    HPI:      PCP:  Morgan Tovar MD  Nephrology: [facility] ([facility]) MWF     65 y.o. male with HTN(stable, increased risk stroke, rupture), Hyperlipidemia(stable, increased risk cardiovascular events), Diabetes Mellitus(stable, increased risk cardiovascular events), Smoker(uncontrolled, increased risk cardiovascular events) and Chronic Kidney Disease stage 5(stable, on dialysis)  Diabetic Ulcer RIGHT great toe. Callus x 6 weeks, now with dry gangrene. Has been soaking (epsom salts) at home and applying antibiotic cream.  smokes 1 PPD.  No TIA stroke amaurosis.  No MI claudication. No other associated signs, symptoms or modifying factors. Urgent vascular consultation requested.    5/2020:  TOMMY: RIGHT 0.5 Biphasic (CFV-DP) LEFT 0.78 Tri/Biphasic (DP/PT)  5/2020 CTA Aorta runoff:  RIGHT external iliac artery 80%, SFA moderate diffuse disease, 3v runoff.  LEFT CFA 70%, SFA 90% prox 70% distal. 2v runoff.    12/2018 Echocardiogram:  EF 60%, LA 40mm, LV 48mm, RVSP 39mmHg.  Tr MR TR.  3/2019 ECG:  NSR 83, QTc 512    The following portions of the patient's history were reviewed and updated as appropriate: allergies, current medications, past family history, past medical history, past social history, past surgical history and problem list.  Recent images independently reviewed.  Available laboratory values reviewed.    PMH:  Past Medical History:   Diagnosis Date   • Callus    • Diabetes mellitus (CMS/HCC)    • ESRD (end stage renal disease) (CMS/[facility])    • GERD (gastroesophageal reflux disease)    • Hyperlipidemia    • Hypertension    • Smoker      Past Surgical History:   Procedure Laterality Date   • APPENDECTOMY     • ARTERIOVENOUS FISTULA Right 02/2019   • CAROTID ENDARTERECTOMY Right      Family History   Problem Relation Age of Onset   • Diabetes Mother    • Diabetes Father    • Diabetes Sister    • Cancer Brother    • Diabetes Brother       Social History     Tobacco Use   • Smoking status: Current Every Day Smoker     Packs/day: 1.00     Years: 55.00     Pack years: 55.00   • Smokeless tobacco: Current User   Substance Use Topics   • Alcohol use: Not on file   • Drug use: No       ALLERGIES:  No Known Allergies      MEDICATIONS:    Current Outpatient Medications:   •  atorvastatin (LIPITOR) 80 MG tablet, Take 1 tablet by mouth Every Night., Disp: 30 tablet, Rfl: 0  •  calcitriol (ROCALTROL) 0.5 MCG capsule, Take 1 capsule by mouth Daily., Disp: 30 capsule, Rfl: 0  •  calcium acetate (PHOS BINDER,) 667 MG capsule capsule, , Disp: , Rfl:   •  carvedilol (COREG) 12.5 MG tablet, Take 12.5 mg by mouth 2 (Two) Times a Day With Meals., Disp: , Rfl:   •  eplerenone (INSPRA) 50 MG tablet, , Disp: , Rfl:   •  HYDROcodone-acetaminophen (NORCO) 7.5-325 MG per tablet, Take 1 tablet by mouth Every 8 (Eight) Hours As Needed for Moderate Pain ., Disp: , Rfl:   •  insulin detemir (LEVEMIR) 100 UNIT/ML injection, Inject 20 Units under the skin into the appropriate area as directed Every Night., Disp: 10 mL, Rfl: 0  •  Insulin Lispro, 1 Unit Dial, (HumaLOG KwikPen) 100 UNIT/ML solution pen-injector, Every 12 (Twelve) Hours., Disp: , Rfl:   •  levoFLOXacin (LEVAQUIN) 500 MG tablet, Take 500 mg by mouth Daily., Disp: , Rfl:   •  losartan (COZAAR) 25 MG tablet, Take 25 mg by mouth Daily., Disp: , Rfl:   •  metoprolol tartrate (LOPRESSOR) 25 MG tablet, Take 1 tablet by mouth Every 12 (Twelve) Hours., Disp: 60 tablet, Rfl: 0  •  minoxidil (LONITEN) 2.5 MG tablet, Take 2.5 mg by mouth Daily., Disp: , Rfl:   •  nitroglycerin (NITROSTAT) 0.4 MG SL tablet, Place 1 tablet under the tongue Every 5 (Five) Minutes As Needed for Chest Pain. Take no more than 3 doses in 15 minutes., Disp: 100 tablet, Rfl: 12  •  omeprazole (priLOSEC) 20 MG capsule, Take 20 mg by mouth Daily., Disp: , Rfl:   •  Patiromer Sorbitex Calcium (VELTASSA) 8.4 g pack, Take 8.4 g by mouth 2 (Two) Times a  "Week. 2-3 TIMES WEEKLY, Disp: , Rfl:   •  sevelamer (RENAGEL) 800 MG tablet, Take 1 tablet by mouth 3 (Three) Times a Day With Meals., Disp: 90 tablet, Rfl: 0  •  torsemide (DEMADEX) 20 MG tablet, Take 20 mg by mouth 2 (Two) Times a Day., Disp: , Rfl:     Review of Systems   Review of Systems   Constitution: Positive for malaise/fatigue. Negative for weight loss.   Cardiovascular: Negative for chest pain, claudication and dyspnea on exertion.   Respiratory: Negative for cough and shortness of breath.    Hematologic/Lymphatic: Bruises/bleeds easily.   Skin: Positive for color change, dry skin and poor wound healing.   Neurological: Negative for dizziness, numbness and weakness.       Physical Exam   Vitals:    05/11/20 1446   BP: 150/68   BP Location: Left arm   Pulse: 73   SpO2: 99%   Weight: 75.3 kg (166 lb)   Height: 177.8 cm (70\")     Physical Exam   Constitutional: He is oriented to person, place, and time. He is active and cooperative. He does not appear ill. No distress.   HENT:   Right Ear: Hearing normal.   Left Ear: Hearing normal.   Nose: No nasal deformity. No epistaxis.   Mouth/Throat: He does not have dentures. Normal dentition.   Cardiovascular: Normal rate and regular rhythm.   No murmur heard.  Pulses:       Carotid pulses are 2+ on the right side, and 2+ on the left side.       Radial pulses are 2+ on the right side, and 2+ on the left side.        Dorsalis pedis pulses are 0 on the right side, and 1+ on the left side.        Posterior tibial pulses are 1+ on the right side, and 1+ on the left side.   Pulmonary/Chest: Effort normal and breath sounds normal.   Abdominal: Soft. He exhibits no distension and no mass. There is no tenderness.   Musculoskeletal: He exhibits no deformity.   Gait normal.    Neurological: He is alert and oriented to person, place, and time. He has normal strength.   Skin: Skin is warm and dry. No cyanosis or erythema. No pallor.   No venous staining   Psychiatric: He has a " normal mood and affect. His speech is normal. Judgment and thought content normal.       BUN   Date Value Ref Range Status   12/31/2018 79 (H) 7 - 21 mg/dL Final     Creatinine   Date Value Ref Range Status   12/31/2018 6.01 (H) 0.70 - 1.30 mg/dL Final     eGFR Non  Amer   Date Value Ref Range Status   12/31/2018 9 (L) 49 - 113 mL/min/1.73 Final     Comment:     <15 Indicative of kidney failure.     eGFR   Amer   Date Value Ref Range Status   12/31/2018  49 - 113 mL/min/1.73 Final     Comment:     <15 Indicative of kidney failure.       ASSESSMENT:  Darwin was seen today for peripheral vascular disease.    Diagnoses and all orders for this visit:    PVD (peripheral vascular disease) (CMS/[facility])  -     Case Request; Standing  -     Case Request    Mixed hyperlipidemia    Type 2 diabetes mellitus with foot ulcer and gangrene (CMS/[facility])    End-stage renal disease on hemodialysis (CMS/[facility])    Benign essential hypertension    Other orders  -     Provide NPO Instructions to Patient; Future  -     Chlorhexidine Skin Prep; Future    PLAN:  Detailed discussion with Darwin Morrell regarding situation, options and plans.  Severe ischemic tissue loss.  Noninvasive studies indicate severe peripheral vascular disease.  Requires additional urgent evaluation with arteriography to evaluate options for improving blood flow to feet, healing wounds and avoiding limb loss.    Risks, including but not limited to:  pain, infection, bleeding, renal failure (dialysis), nerve or blood vessel injury, need for emergent open operation to restore blood flow.  Benefits: relief of symptoms, reduction in risk of limb loss, improved wound healing.  Options:  Medical therapy, alternative imaging discussed.  Understands and wishes to proceed with plan.    Abdominal Aortogram, bilateral lower extremity runoff, possible balloon angioplasty, thrombolysis, atherectomy or stent.  Local/IV sedation.  SDS.  5/14/2020    Return after above  studies complete  Smoking cessation advised and assistance options offered including behavioral counseling ([employer] [facility] [employer] [facility]), Nicotine replacement therapy (patches or gum), pharmacologic therapy (Chantix, Wellbutrin). patient understands that continued use of tobacco products increases risk of heart disease, stroke, cancer; counseling for 3-5min.  Recommended regular physical activity, progressive walking program.  Continue current medications as directed.    Thank you for the opportunity to participate in this patient's care.    Copy to primary care provider.    EMR Dragon/Transcription disclaimer:   Much of this encounter note is an electronic transcription/translation of spoken language to printed text. The electronic translation of spoken language may permit erroneous, or at times, nonsensical words or phrases to be inadvertently transcribed; Although I have reviewed the note for such errors, some may still exist.

## 2020-05-12 NOTE — PROGRESS NOTES
5/11/2020    Darwin Morrell  1955    Chief Complaint:  PVD    HPI:      PCP:  Morgan Tovar MD  Nephrology: [facility] ([facility]) MWF     65 y.o. male with HTN(stable, increased risk stroke, rupture), Hyperlipidemia(stable, increased risk cardiovascular events), Diabetes Mellitus(stable, increased risk cardiovascular events), Smoker(uncontrolled, increased risk cardiovascular events) and Chronic Kidney Disease stage 5(stable, on dialysis)  Diabetic Ulcer RIGHT great toe. Callus x 6 weeks, now with dry gangrene. Has been soaking (epsom salts) at home and applying antibiotic cream.  smokes 1 PPD.  No TIA stroke amaurosis.  No MI claudication. No other associated signs, symptoms or modifying factors. Urgent vascular consultation requested.    5/2020:  TOMMY: RIGHT 0.5 Biphasic (CFV-DP) LEFT 0.78 Tri/Biphasic (DP/PT)  5/2020 CTA Aorta runoff:  RIGHT external iliac artery 80%, SFA moderate diffuse disease, 3v runoff.  LEFT CFA 70%, SFA 90% prox 70% distal. 2v runoff.    12/2018 Echocardiogram:  EF 60%, LA 40mm, LV 48mm, RVSP 39mmHg.  Tr MR TR.  3/2019 ECG:  NSR 83, QTc 512    The following portions of the patient's history were reviewed and updated as appropriate: allergies, current medications, past family history, past medical history, past social history, past surgical history and problem list.  Recent images independently reviewed.  Available laboratory values reviewed.    PMH:  Past Medical History:   Diagnosis Date   • Callus    • Diabetes mellitus (CMS/HCC)    • ESRD (end stage renal disease) (CMS/[facility])    • GERD (gastroesophageal reflux disease)    • Hyperlipidemia    • Hypertension    • Smoker      Past Surgical History:   Procedure Laterality Date   • APPENDECTOMY     • ARTERIOVENOUS FISTULA Right 02/2019   • CAROTID ENDARTERECTOMY Right      Family History   Problem Relation Age of Onset   • Diabetes Mother    • Diabetes Father    • Diabetes Sister    • Cancer Brother    • Diabetes Brother       Social History     Tobacco Use   • Smoking status: Current Every Day Smoker     Packs/day: 1.00     Years: 55.00     Pack years: 55.00   • Smokeless tobacco: Current User   Substance Use Topics   • Alcohol use: Not on file   • Drug use: No       ALLERGIES:  No Known Allergies      MEDICATIONS:    Current Outpatient Medications:   •  atorvastatin (LIPITOR) 80 MG tablet, Take 1 tablet by mouth Every Night., Disp: 30 tablet, Rfl: 0  •  calcitriol (ROCALTROL) 0.5 MCG capsule, Take 1 capsule by mouth Daily., Disp: 30 capsule, Rfl: 0  •  calcium acetate (PHOS BINDER,) 667 MG capsule capsule, , Disp: , Rfl:   •  carvedilol (COREG) 12.5 MG tablet, Take 12.5 mg by mouth 2 (Two) Times a Day With Meals., Disp: , Rfl:   •  eplerenone (INSPRA) 50 MG tablet, , Disp: , Rfl:   •  HYDROcodone-acetaminophen (NORCO) 7.5-325 MG per tablet, Take 1 tablet by mouth Every 8 (Eight) Hours As Needed for Moderate Pain ., Disp: , Rfl:   •  insulin detemir (LEVEMIR) 100 UNIT/ML injection, Inject 20 Units under the skin into the appropriate area as directed Every Night., Disp: 10 mL, Rfl: 0  •  Insulin Lispro, 1 Unit Dial, (HumaLOG KwikPen) 100 UNIT/ML solution pen-injector, Every 12 (Twelve) Hours., Disp: , Rfl:   •  levoFLOXacin (LEVAQUIN) 500 MG tablet, Take 500 mg by mouth Daily., Disp: , Rfl:   •  losartan (COZAAR) 25 MG tablet, Take 25 mg by mouth Daily., Disp: , Rfl:   •  metoprolol tartrate (LOPRESSOR) 25 MG tablet, Take 1 tablet by mouth Every 12 (Twelve) Hours., Disp: 60 tablet, Rfl: 0  •  minoxidil (LONITEN) 2.5 MG tablet, Take 2.5 mg by mouth Daily., Disp: , Rfl:   •  nitroglycerin (NITROSTAT) 0.4 MG SL tablet, Place 1 tablet under the tongue Every 5 (Five) Minutes As Needed for Chest Pain. Take no more than 3 doses in 15 minutes., Disp: 100 tablet, Rfl: 12  •  omeprazole (priLOSEC) 20 MG capsule, Take 20 mg by mouth Daily., Disp: , Rfl:   •  Patiromer Sorbitex Calcium (VELTASSA) 8.4 g pack, Take 8.4 g by mouth 2 (Two) Times a  "Week. 2-3 TIMES WEEKLY, Disp: , Rfl:   •  sevelamer (RENAGEL) 800 MG tablet, Take 1 tablet by mouth 3 (Three) Times a Day With Meals., Disp: 90 tablet, Rfl: 0  •  torsemide (DEMADEX) 20 MG tablet, Take 20 mg by mouth 2 (Two) Times a Day., Disp: , Rfl:     Review of Systems   Review of Systems   Constitution: Positive for malaise/fatigue. Negative for weight loss.   Cardiovascular: Negative for chest pain, claudication and dyspnea on exertion.   Respiratory: Negative for cough and shortness of breath.    Hematologic/Lymphatic: Bruises/bleeds easily.   Skin: Positive for color change, dry skin and poor wound healing.   Neurological: Negative for dizziness, numbness and weakness.       Physical Exam   Vitals:    05/11/20 1446   BP: 150/68   BP Location: Left arm   Pulse: 73   SpO2: 99%   Weight: 75.3 kg (166 lb)   Height: 177.8 cm (70\")     Physical Exam   Constitutional: He is oriented to person, place, and time. He is active and cooperative. He does not appear ill. No distress.   HENT:   Right Ear: Hearing normal.   Left Ear: Hearing normal.   Nose: No nasal deformity. No epistaxis.   Mouth/Throat: He does not have dentures. Normal dentition.   Cardiovascular: Normal rate and regular rhythm.   No murmur heard.  Pulses:       Carotid pulses are 2+ on the right side, and 2+ on the left side.       Radial pulses are 2+ on the right side, and 2+ on the left side.        Dorsalis pedis pulses are 0 on the right side, and 1+ on the left side.        Posterior tibial pulses are 1+ on the right side, and 1+ on the left side.   Pulmonary/Chest: Effort normal and breath sounds normal.   Abdominal: Soft. He exhibits no distension and no mass. There is no tenderness.   Musculoskeletal: He exhibits no deformity.   Gait normal.    Neurological: He is alert and oriented to person, place, and time. He has normal strength.   Skin: Skin is warm and dry. No cyanosis or erythema. No pallor.   No venous staining   Psychiatric: He has a " normal mood and affect. His speech is normal. Judgment and thought content normal.       BUN   Date Value Ref Range Status   12/31/2018 79 (H) 7 - 21 mg/dL Final     Creatinine   Date Value Ref Range Status   12/31/2018 6.01 (H) 0.70 - 1.30 mg/dL Final     eGFR Non  Amer   Date Value Ref Range Status   12/31/2018 9 (L) 49 - 113 mL/min/1.73 Final     Comment:     <15 Indicative of kidney failure.     eGFR   Amer   Date Value Ref Range Status   12/31/2018  49 - 113 mL/min/1.73 Final     Comment:     <15 Indicative of kidney failure.       ASSESSMENT:  Darwin was seen today for peripheral vascular disease.    Diagnoses and all orders for this visit:    PVD (peripheral vascular disease) (CMS/[facility])  -     Case Request; Standing  -     Case Request    Mixed hyperlipidemia    Type 2 diabetes mellitus with foot ulcer and gangrene (CMS/[facility])    End-stage renal disease on hemodialysis (CMS/[facility])    Benign essential hypertension    Other orders  -     Provide NPO Instructions to Patient; Future  -     Chlorhexidine Skin Prep; Future    PLAN:  Detailed discussion with Darwin Morrell regarding situation, options and plans.  Severe ischemic tissue loss.  Noninvasive studies indicate severe peripheral vascular disease.  Requires additional urgent evaluation with arteriography to evaluate options for improving blood flow to feet, healing wounds and avoiding limb loss.    Risks, including but not limited to:  pain, infection, bleeding, renal failure (dialysis), nerve or blood vessel injury, need for emergent open operation to restore blood flow.  Benefits: relief of symptoms, reduction in risk of limb loss, improved wound healing.  Options:  Medical therapy, alternative imaging discussed.  Understands and wishes to proceed with plan.    Abdominal Aortogram, bilateral lower extremity runoff, possible balloon angioplasty, thrombolysis, atherectomy or stent.  Local/IV sedation.  SDS.  5/14/2020    Return after above  studies complete  Smoking cessation advised and assistance options offered including behavioral counseling ([employer] [facility] [facility]), Nicotine replacement therapy (patches or gum), pharmacologic therapy (Chantix, Wellbutrin). patient understands that continued use of tobacco products increases risk of heart disease, stroke, cancer; counseling for 3-5min.  Recommended regular physical activity, progressive walking program.  Continue current medications as directed.    Thank you for the opportunity to participate in this patient's care.    Copy to primary care provider.    EMR Dragon/Transcription disclaimer:   Much of this encounter note is an electronic transcription/translation of spoken language to printed text. The electronic translation of spoken language may permit erroneous, or at times, nonsensical words or phrases to be inadvertently transcribed; Although I have reviewed the note for such errors, some may still exist.

## 2020-05-13 LAB
COVID LABCORP PRIORITY: NORMAL
SARS-COV-2 RNA RESP QL NAA+PROBE: NOT DETECTED

## 2020-05-14 ENCOUNTER — APPOINTMENT (OUTPATIENT)
Dept: ULTRASOUND IMAGING | Facility: HOSPITAL | Age: 65
End: 2020-05-14

## 2020-05-14 ENCOUNTER — HOSPITAL ENCOUNTER (OUTPATIENT)
Facility: HOSPITAL | Age: 65
Setting detail: HOSPITAL OUTPATIENT SURGERY
Discharge: HOME | End: 2020-05-14
Attending: THORACIC SURGERY (CARDIOTHORACIC VASCULAR SURGERY) | Admitting: THORACIC SURGERY (CARDIOTHORACIC VASCULAR SURGERY)

## 2020-05-14 VITALS
BODY MASS INDEX: 23.29 KG/M2 | HEIGHT: 70 IN | SYSTOLIC BLOOD PRESSURE: 186 MMHG | OXYGEN SATURATION: 95 % | WEIGHT: 162.7 LBS | TEMPERATURE: 98 F | DIASTOLIC BLOOD PRESSURE: 87 MMHG | HEART RATE: 83 BPM | RESPIRATION RATE: 18 BRPM

## 2020-05-14 DIAGNOSIS — I73.9 PVD (PERIPHERAL VASCULAR DISEASE) (HCC): ICD-10-CM

## 2020-05-14 PROCEDURE — 25010000002 MIDAZOLAM PER 1 MG: Performed by: THORACIC SURGERY (CARDIOTHORACIC VASCULAR SURGERY)

## 2020-05-14 PROCEDURE — 25010000002 FENTANYL CITRATE (PF) 100 MCG/2ML SOLUTION: Performed by: THORACIC SURGERY (CARDIOTHORACIC VASCULAR SURGERY)

## 2020-05-14 PROCEDURE — 76937 US GUIDE VASCULAR ACCESS: CPT

## 2020-05-14 PROCEDURE — 25010000002 IOPAMIDOL 61 % SOLUTION: Performed by: THORACIC SURGERY (CARDIOTHORACIC VASCULAR SURGERY)

## 2020-05-14 PROCEDURE — C1887 CATHETER, GUIDING: HCPCS | Performed by: THORACIC SURGERY (CARDIOTHORACIC VASCULAR SURGERY)

## 2020-05-14 PROCEDURE — C1725 CATH, TRANSLUMIN NON-LASER: HCPCS | Performed by: THORACIC SURGERY (CARDIOTHORACIC VASCULAR SURGERY)

## 2020-05-14 PROCEDURE — C1769 GUIDE WIRE: HCPCS | Performed by: THORACIC SURGERY (CARDIOTHORACIC VASCULAR SURGERY)

## 2020-05-14 PROCEDURE — C1894 INTRO/SHEATH, NON-LASER: HCPCS | Performed by: THORACIC SURGERY (CARDIOTHORACIC VASCULAR SURGERY)

## 2020-05-14 PROCEDURE — 25010000002 HEPARIN (PORCINE) PER 1000 UNITS: Performed by: THORACIC SURGERY (CARDIOTHORACIC VASCULAR SURGERY)

## 2020-05-14 PROCEDURE — 37221 PR REVSC OPN/PRQ ILIAC ART W/STNT PLMT & ANGIOPLSTY: CPT | Performed by: THORACIC SURGERY (CARDIOTHORACIC VASCULAR SURGERY)

## 2020-05-14 PROCEDURE — C1877 STENT, NON-COAT/COV W/O DEL: HCPCS | Performed by: THORACIC SURGERY (CARDIOTHORACIC VASCULAR SURGERY)

## 2020-05-14 PROCEDURE — C2623 CATH, TRANSLUMIN, DRUG-COAT: HCPCS | Performed by: THORACIC SURGERY (CARDIOTHORACIC VASCULAR SURGERY)

## 2020-05-14 PROCEDURE — C1760 CLOSURE DEV, VASC: HCPCS | Performed by: THORACIC SURGERY (CARDIOTHORACIC VASCULAR SURGERY)

## 2020-05-14 PROCEDURE — 75716 ARTERY X-RAYS ARMS/LEGS: CPT | Performed by: THORACIC SURGERY (CARDIOTHORACIC VASCULAR SURGERY)

## 2020-05-14 PROCEDURE — 75625 CONTRAST EXAM ABDOMINL AORTA: CPT | Performed by: THORACIC SURGERY (CARDIOTHORACIC VASCULAR SURGERY)

## 2020-05-14 DEVICE — BARD® E-LUMINEXX™ VASCULAR AND BILIARY STENT 8 MM X 100 MM (135 CM DELIVERY CATHETER)
Type: IMPLANTABLE DEVICE | Status: FUNCTIONAL
Brand: E-LUMINEXX® VASCULAR & BILIARY STENT

## 2020-05-14 RX ORDER — MIDAZOLAM HYDROCHLORIDE 1 MG/ML
INJECTION INTRAMUSCULAR; INTRAVENOUS
Status: DISCONTINUED
Start: 2020-05-14 | End: 2020-05-14 | Stop reason: HOSPADM

## 2020-05-14 RX ORDER — FENTANYL CITRATE 50 UG/ML
INJECTION, SOLUTION INTRAMUSCULAR; INTRAVENOUS AS NEEDED
Status: DISCONTINUED | OUTPATIENT
Start: 2020-05-14 | End: 2020-05-14 | Stop reason: HOSPADM

## 2020-05-14 RX ORDER — ASPIRIN 81 MG/1
324 TABLET, CHEWABLE ORAL ONCE
Status: COMPLETED | OUTPATIENT
Start: 2020-05-14 | End: 2020-05-14

## 2020-05-14 RX ORDER — ASPIRIN 81 MG/1
81 TABLET ORAL DAILY
Qty: 150 TABLET | Refills: 2 | Status: SHIPPED | OUTPATIENT
Start: 2020-05-14 | End: 2021-05-14

## 2020-05-14 RX ORDER — FENTANYL CITRATE 50 UG/ML
INJECTION, SOLUTION INTRAMUSCULAR; INTRAVENOUS
Status: DISCONTINUED
Start: 2020-05-14 | End: 2020-05-14 | Stop reason: HOSPADM

## 2020-05-14 RX ORDER — SODIUM CHLORIDE 9 MG/ML
50 INJECTION, SOLUTION INTRAVENOUS CONTINUOUS
Status: DISCONTINUED | OUTPATIENT
Start: 2020-05-14 | End: 2020-05-14 | Stop reason: HOSPADM

## 2020-05-14 RX ORDER — ACETAMINOPHEN 325 MG/1
650 TABLET ORAL EVERY 4 HOURS PRN
Status: DISCONTINUED | OUTPATIENT
Start: 2020-05-14 | End: 2020-05-14 | Stop reason: HOSPADM

## 2020-05-14 RX ORDER — HEPARIN SODIUM 1000 [USP'U]/ML
INJECTION, SOLUTION INTRAVENOUS; SUBCUTANEOUS AS NEEDED
Status: DISCONTINUED | OUTPATIENT
Start: 2020-05-14 | End: 2020-05-14 | Stop reason: HOSPADM

## 2020-05-14 RX ORDER — HEPARIN SODIUM 1000 [USP'U]/ML
INJECTION, SOLUTION INTRAVENOUS; SUBCUTANEOUS
Status: DISCONTINUED
Start: 2020-05-14 | End: 2020-05-14 | Stop reason: HOSPADM

## 2020-05-14 RX ORDER — HYDROCODONE BITARTRATE AND ACETAMINOPHEN 7.5; 325 MG/1; MG/1
1 TABLET ORAL EVERY 4 HOURS PRN
Status: DISCONTINUED | OUTPATIENT
Start: 2020-05-14 | End: 2020-05-14 | Stop reason: HOSPADM

## 2020-05-14 RX ORDER — CLOPIDOGREL BISULFATE 75 MG/1
150 TABLET ORAL ONCE
Status: COMPLETED | OUTPATIENT
Start: 2020-05-14 | End: 2020-05-14

## 2020-05-14 RX ORDER — MIDAZOLAM HYDROCHLORIDE 1 MG/ML
INJECTION INTRAMUSCULAR; INTRAVENOUS AS NEEDED
Status: DISCONTINUED | OUTPATIENT
Start: 2020-05-14 | End: 2020-05-14 | Stop reason: HOSPADM

## 2020-05-14 RX ORDER — CLOPIDOGREL BISULFATE 75 MG/1
75 TABLET ORAL DAILY
Qty: 30 TABLET | Refills: 11 | Status: SHIPPED | OUTPATIENT
Start: 2020-05-14 | End: 2021-04-28

## 2020-05-14 RX ORDER — LIDOCAINE HYDROCHLORIDE 20 MG/ML
INJECTION, SOLUTION INFILTRATION; PERINEURAL
Status: DISCONTINUED
Start: 2020-05-14 | End: 2020-05-14 | Stop reason: HOSPADM

## 2020-05-14 RX ADMIN — CLOPIDOGREL BISULFATE 150 MG: 75 TABLET ORAL at 13:06

## 2020-05-14 RX ADMIN — SODIUM CHLORIDE 50 ML/HR: 9 INJECTION, SOLUTION INTRAVENOUS at 08:07

## 2020-05-14 RX ADMIN — ASPIRIN 81 MG 324 MG: 81 TABLET ORAL at 13:07

## 2020-05-20 ENCOUNTER — OFFICE VISIT (OUTPATIENT)
Dept: PODIATRY | Facility: CLINIC | Age: 65
End: 2020-05-20

## 2020-05-20 VITALS — BODY MASS INDEX: 23.19 KG/M2 | OXYGEN SATURATION: 91 % | HEART RATE: 81 BPM | HEIGHT: 70 IN | WEIGHT: 162 LBS

## 2020-05-20 DIAGNOSIS — I96 GANGRENE OF TOE OF RIGHT FOOT (HCC): Primary | ICD-10-CM

## 2020-05-20 DIAGNOSIS — I73.9 PVD (PERIPHERAL VASCULAR DISEASE) (HCC): ICD-10-CM

## 2020-05-20 PROCEDURE — 99214 OFFICE O/P EST MOD 30 MIN: CPT | Performed by: PODIATRIST

## 2020-05-20 NOTE — PROGRESS NOTES
Darwin Morrell  1955  65 y.o. male   M. SHELIA Tovar - Pt has an appt on 5/12/2020  BS -  109 per pt    Patient presents today with a complaint of a dark toe right foot and callus on his left foot.    05/20/2020     Chief Complaint   Patient presents with   • Right Foot - Wound Check       History of Present Illness    Darwin Morrell is a 65 y.o.male who presents to clinic today for follow-up.  Patient had angiogram with stenting of the right iliac on May 14 by Dr. Gustafson.  Per Dr. Gustafson two-vessel runoff to the foot.  Patient complains today of smelling gangrenous right great toe.  He is ready to proceed with amputation.    Past Medical History:   Diagnosis Date   • Callus    • Diabetes mellitus (CMS/HCC)    • ESRD (end stage renal disease) (CMS/HCC)    • GERD (gastroesophageal reflux disease)    • Hyperlipidemia    • Hypertension    • Smoker          Past Surgical History:   Procedure Laterality Date   • APPENDECTOMY     • ARTERIOGRAM N/A 5/14/2020    Procedure: abdominal aortogram bilateral lower extremity runoff possible angioplasty stent thrombolysis atherectomy;  Surgeon: Ruben Gustafson MD;  Location: [facility] ANGIO INVASIVE LOCATION;  Service: Interventional Radiology;  Laterality: N/A;   • ARTERIOVENOUS FISTULA Right 02/2019   • CAROTID ENDARTERECTOMY Right          Family History   Problem Relation Age of Onset   • Diabetes Mother    • Diabetes Father    • Diabetes Sister    • Cancer Brother    • Diabetes Brother        No Known Allergies    Social History     Socioeconomic History   • Marital status:      Spouse name: Not on file   • Number of children: Not on file   • Years of education: Not on file   • Highest education level: Not on file   Tobacco Use   • Smoking status: Current Every Day Smoker     Packs/day: 1.00     Years: 55.00     Pack years: 55.00   • Smokeless tobacco: Former User   Substance and Sexual Activity   • Alcohol use: Never     Frequency: Never   • Drug use:  No   • Sexual activity: Defer         Current Outpatient Medications   Medication Sig Dispense Refill   • aspirin 81 MG EC tablet Take 1 tablet by mouth Daily. 150 tablet 2   • atorvastatin (LIPITOR) 80 MG tablet Take 1 tablet by mouth Every Night. 30 tablet 0   • calcitriol (ROCALTROL) 0.5 MCG capsule Take 1 capsule by mouth Daily. 30 capsule 0   • calcium acetate (PHOS BINDER,) 667 MG capsule capsule Take 1,334 mg by mouth 3 (Three) Times a Day.     • carvedilol (COREG) 12.5 MG tablet Take 12.5 mg by mouth 2 (Two) Times a Day With Meals.     • clopidogrel (PLAVIX) 75 MG tablet Take 1 tablet by mouth Daily. 30 tablet 11   • eplerenone (INSPRA) 50 MG tablet Take 50 mg by mouth Daily.     • HYDROcodone-acetaminophen (NORCO) 7.5-325 MG per tablet Take 1 tablet by mouth Every 8 (Eight) Hours As Needed for Moderate Pain .     • insulin detemir (LEVEMIR) 100 UNIT/ML injection Inject 20 Units under the skin into the appropriate area as directed Every Night. 10 mL 0   • insulin lispro (humaLOG) 100 UNIT/ML injection Inject 10 Units under the skin into the appropriate area as directed 3 (Three) Times a Day Before Meals. Sliding scale     • losartan (COZAAR) 25 MG tablet Take 25 mg by mouth Daily.     • metoprolol tartrate (LOPRESSOR) 25 MG tablet Take 1 tablet by mouth Every 12 (Twelve) Hours. 60 tablet 0   • minoxidil (LONITEN) 2.5 MG tablet Take 2.5 mg by mouth Daily.     • nitroglycerin (NITROSTAT) 0.4 MG SL tablet Place 1 tablet under the tongue Every 5 (Five) Minutes As Needed for Chest Pain. Take no more than 3 doses in 15 minutes. 100 tablet 12   • omeprazole (priLOSEC) 20 MG capsule Take 20 mg by mouth Daily.     • Patiromer Sorbitex Calcium (VELTASSA) 8.4 g pack Take 8.4 g by mouth 2 (Two) Times a Week. 2-3 TIMES WEEKLY     • sevelamer (RENAGEL) 800 MG tablet Take 1 tablet by mouth 3 (Three) Times a Day With Meals. 90 tablet 0   • torsemide (DEMADEX) 20 MG tablet Take 20 mg by mouth 2 (Two) Times a Day.       No  "current facility-administered medications for this visit.        Review of Systems   Constitutional: Negative.    HENT: Negative.    Eyes: Positive for visual disturbance.   Respiratory: Negative.    Cardiovascular: Negative.    Gastrointestinal: Negative.    Musculoskeletal:        Foot pain   Skin: Positive for color change and wound.   Allergic/Immunologic: Negative.    Psychiatric/Behavioral: Negative.          OBJECTIVE    Pulse 81   Ht 177.8 cm (70\")   Wt 73.5 kg (162 lb)   SpO2 91%   BMI 23.24 kg/m²       Physical Exam   Constitutional: He is oriented to person, place, and time. He appears well-developed and well-nourished. No distress.   HENT:   Head: Normocephalic and atraumatic.   Nose: Nose normal.   Eyes: Pupils are equal, round, and reactive to light. Conjunctivae and EOM are normal.   Pulmonary/Chest: Effort normal. No respiratory distress. He has no wheezes.   Musculoskeletal: Normal range of motion. He exhibits deformity. He exhibits no edema.   Neurological: He is alert and oriented to person, place, and time.   Psychiatric: He has a normal mood and affect. His behavior is normal. Thought content normal.   Vitals reviewed.      Lower Extremity    Cardiovascular:    DP/PT pulses non-palpable bilateral  Skin temp is warm to warm from proximal tibia to distal digits bilateral  Pedal hair growth present.   No erythema or edema noted   Musculoskeletal:  Muscle strength is 5/5 for all muscle groups tested   No pain on palpation noted bilateral  Dermatological:   Gangrene noted to right hallux.  Foul odor noted.  No purulent drainage.  Webspaces 1-4 bilateral are clean, dry and intact.   No subcutaneous nodules or masses noted    Hyperkeratotic tissue noted to medial IPJ of left hallux and plantar left foot.  Neurological:   Protective sensation diminished  Sensation intact to light touch        Procedures        ASSESSMENT AND PLAN    Darwin was seen today for wound check.    Diagnoses and all orders " for this visit:    Gangrene of toe of right foot (CMS/[facility])  -     Case Request; Standing  -     Case Request    PVD (peripheral vascular disease) (CMS/[facility])    Other orders  -     Follow Anesthesia Guidelines / Standing Orders; Future      -Ready to proceed with right hallux amputation.  Risks and benefits of the surgery were discussed in detail.  No guarantees were given or implied.  Tentative plan for right hallux amputation and all other indicated procedures.  Tentative date for surgery is May 28th.  Continue daily Betadine application to right hallux.  -All of his questions were answered  -Recheck following surgery    I spent 25 minutes face-to-face this patient discussing treatment options including surgery.  Surgery was scheduled during this visit.          This document has been electronically signed by David Murguai DPM on May 20, 2020 14:37     5/20/2020  14:37

## 2020-05-21 ENCOUNTER — OFFICE VISIT (OUTPATIENT)
Dept: CARDIAC SURGERY | Facility: CLINIC | Age: 65
End: 2020-05-21

## 2020-05-21 VITALS
HEART RATE: 77 BPM | WEIGHT: 164.2 LBS | BODY MASS INDEX: 23.51 KG/M2 | HEIGHT: 70 IN | OXYGEN SATURATION: 99 % | DIASTOLIC BLOOD PRESSURE: 78 MMHG | SYSTOLIC BLOOD PRESSURE: 140 MMHG

## 2020-05-21 DIAGNOSIS — I70.231 ATHEROSCLEROSIS OF NATIVE ARTERY OF RIGHT LOWER EXTREMITY WITH ULCERATION OF THIGH (HCC): Primary | ICD-10-CM

## 2020-05-21 DIAGNOSIS — L97.509 TYPE 2 DIABETES MELLITUS WITH FOOT ULCER AND GANGRENE (HCC): ICD-10-CM

## 2020-05-21 DIAGNOSIS — E11.621 TYPE 2 DIABETES MELLITUS WITH FOOT ULCER AND GANGRENE (HCC): ICD-10-CM

## 2020-05-21 DIAGNOSIS — E11.52 TYPE 2 DIABETES MELLITUS WITH FOOT ULCER AND GANGRENE (HCC): ICD-10-CM

## 2020-05-21 DIAGNOSIS — F17.200 TOBACCO DEPENDENCE: ICD-10-CM

## 2020-05-21 DIAGNOSIS — E78.2 MIXED HYPERLIPIDEMIA: ICD-10-CM

## 2020-05-21 PROCEDURE — 99214 OFFICE O/P EST MOD 30 MIN: CPT | Performed by: NURSE PRACTITIONER

## 2020-05-21 NOTE — PATIENT INSTRUCTIONS
mild reduction Arterial Flow right Lower Extremity improved  Medical Management: ASA,PLAVIX,STATIN   If signs and symptoms of ischemia should occur including but not limited to pale/blue discoloration of limb, increasing pain with ambulation or at rest, or a non-healing wound. Patient is to notify Heart and Vascular center for immediate evaluation.   Return 6 weeks    Smoking cessation advised.  Tobacco increases risk of expanding AAA, MI, CVA, PAD, carcinoma.

## 2020-05-26 LAB — SARS-COV-2 RNA RESP QL NAA+PROBE: NOT DETECTED

## 2020-05-26 PROCEDURE — 87635 SARS-COV-2 COVID-19 AMP PRB: CPT | Performed by: PODIATRIST

## 2020-05-27 NOTE — PROGRESS NOTES
CVTS Office Progress Note       Subjective   Patient ID: Darwin Morrell is a 65 y.o. male is being seen for follow up.  Chief Complaint:    Chief Complaint   Patient presents with   • Peripheral Vascular Disease       The following portions of the patient's history were reviewed and updated as appropriate: allergies, current medications, past family history, past medical history, past social history, past surgical history and problem list.  Recent images independently reviewed.  Available laboratory values reviewed.    PCP:  Morgan Tovar MD   Nephrology: [facility]. [facility] ([facility]) MWF     65 y.o. male with HTN(stable, increased risk stroke, rupture), Hyperlipidemia(stable, increased risk cardiovascular events), Diabetes Mellitus(stable, increased risk cardiovascular events), Smoker(uncontrolled, increased risk cardiovascular events) and Chronic Kidney Disease stage 5(stable, on dialysis)  Diabetic Ulcer RIGHT great toe. Callus x 6 weeks, now with dry gangrene. Has been soaking (epsom salts) at home and applying antibiotic cream.  smokes 1 PPD.  No TIA stroke amaurosis.  No MI claudication. No other associated signs, symptoms or modifying factors. Urgent vascular consultation requested.    5/5/2020:  TOMMY: RIGHT 0.5 Biphasic (CFV-DP) LEFT 0.78 Tri/Biphasic (DP/PT)  5/2020 CTA Aorta runoff:  RIGHT external iliac artery 80%, SFA moderate diffuse disease, 3v runoff.  LEFT CFA 70%, SFA 90% prox 70% distal. 2v runoff.  5/14/2020: PTA/Stent RIGHT EIA  5/21/2020 TOMMY: RIGHT 1.0 Tri/Biphasic(PT/DP)  LEFT 0.7 Tri/Biphasic (PT)    2/2018 Echocardiogram:  EF 60%, LA 40mm, LV 48mm, RVSP 39mmHg.  Tr MR TR.      Past Medical History:   Diagnosis Date   • Callus    • Diabetes mellitus (CMS/HCC)    • ESRD (end stage renal disease) (CMS/HCC)    • GERD (gastroesophageal reflux disease)    • Hyperlipidemia    • Hypertension    • Smoker      Past Surgical History:   Procedure Laterality Date   • APPENDECTOMY     •  ARTERIOGRAM N/A 5/14/2020    Procedure: abdominal aortogram bilateral lower extremity runoff possible angioplasty stent thrombolysis atherectomy;  Surgeon: Ruben Gustafson MD;  Location: [facility] ANGIO INVASIVE LOCATION;  Service: Interventional Radiology;  Laterality: N/A;   • ARTERIOVENOUS FISTULA Right 02/2019   • CAROTID ENDARTERECTOMY Right      Family History   Problem Relation Age of Onset   • Diabetes Mother    • Diabetes Father    • Diabetes Sister    • Cancer Brother    • Diabetes Brother      Social History     Tobacco Use   • Smoking status: Current Every Day Smoker     Packs/day: 1.00     Years: 55.00     Pack years: 55.00   • Smokeless tobacco: Former User   Substance Use Topics   • Alcohol use: Never     Frequency: Never   • Drug use: No       ALLERGIES:   Patient has no known allergies.    MEDICATIONS:      Current Outpatient Medications:   •  aspirin 81 MG EC tablet, Take 1 tablet by mouth Daily., Disp: 150 tablet, Rfl: 2  •  atorvastatin (LIPITOR) 80 MG tablet, Take 1 tablet by mouth Every Night., Disp: 30 tablet, Rfl: 0  •  calcitriol (ROCALTROL) 0.5 MCG capsule, Take 1 capsule by mouth Daily., Disp: 30 capsule, Rfl: 0  •  calcium acetate (PHOS BINDER,) 667 MG capsule capsule, Take 1,334 mg by mouth 3 (Three) Times a Day., Disp: , Rfl:   •  carvedilol (COREG) 12.5 MG tablet, Take 12.5 mg by mouth 2 (Two) Times a Day With Meals., Disp: , Rfl:   •  clopidogrel (PLAVIX) 75 MG tablet, Take 1 tablet by mouth Daily., Disp: 30 tablet, Rfl: 11  •  eplerenone (INSPRA) 50 MG tablet, Take 50 mg by mouth Daily., Disp: , Rfl:   •  HYDROcodone-acetaminophen (NORCO) 7.5-325 MG per tablet, Take 1 tablet by mouth Every 8 (Eight) Hours As Needed for Moderate Pain ., Disp: , Rfl:   •  insulin detemir (LEVEMIR) 100 UNIT/ML injection, Inject 20 Units under the skin into the appropriate area as directed Every Night., Disp: 10 mL, Rfl: 0  •  insulin lispro (humaLOG) 100 UNIT/ML injection, Inject 10 Units under the  "skin into the appropriate area as directed 3 (Three) Times a Day Before Meals. Sliding scale, Disp: , Rfl:   •  losartan (COZAAR) 25 MG tablet, Take 25 mg by mouth Daily., Disp: , Rfl:   •  metoprolol tartrate (LOPRESSOR) 25 MG tablet, Take 1 tablet by mouth Every 12 (Twelve) Hours., Disp: 60 tablet, Rfl: 0  •  minoxidil (LONITEN) 2.5 MG tablet, Take 2.5 mg by mouth Daily., Disp: , Rfl:   •  nitroglycerin (NITROSTAT) 0.4 MG SL tablet, Place 1 tablet under the tongue Every 5 (Five) Minutes As Needed for Chest Pain. Take no more than 3 doses in 15 minutes., Disp: 100 tablet, Rfl: 12  •  omeprazole (priLOSEC) 20 MG capsule, Take 20 mg by mouth Daily., Disp: , Rfl:   •  Patiromer Sorbitex Calcium (VELTASSA) 8.4 g pack, Take 8.4 g by mouth 2 (Two) Times a Week. 2-3 TIMES WEEKLY, Disp: , Rfl:   •  sevelamer (RENAGEL) 800 MG tablet, Take 1 tablet by mouth 3 (Three) Times a Day With Meals., Disp: 90 tablet, Rfl: 0  •  torsemide (DEMADEX) 20 MG tablet, Take 20 mg by mouth 2 (Two) Times a Day., Disp: , Rfl:     Review of Systems   Constitution: Negative for fever.   Eyes: Negative for visual disturbance.   Cardiovascular: Negative for claudication, cyanosis and leg swelling.   Respiratory: Negative for shortness of breath.    Skin: Positive for color change, dry skin, nail changes and poor wound healing.   Musculoskeletal: Positive for arthritis. Negative for muscle weakness.   Gastrointestinal: Negative for dysphagia.   Neurological: Negative for focal weakness, light-headedness, loss of balance, numbness, paresthesias and weakness.   Psychiatric/Behavioral: Negative for altered mental status.   All other systems reviewed and are negative.       Objective   Vitals:    05/21/20 1547   BP: 140/78   BP Location: Left arm   Pulse: 77   SpO2: 99%   Weight: 74.5 kg (164 lb 3.2 oz)   Height: 177.8 cm (70\")     Body mass index is 23.56 kg/m².  Physical Exam   Constitutional: He is oriented to person, place, and time. He appears " well-nourished.   HENT:   Head: Atraumatic.   Eyes: EOM are normal.   Neck: Neck supple.   Cardiovascular: Normal rate and normal heart sounds.   Pulses:       Dorsalis pedis pulses are 1+ on the right side, and 1+ on the left side.        Posterior tibial pulses are 1+ on the right side, and 1+ on the left side.   (R) AV Fistula: +thrill/bruit   Pulmonary/Chest: Effort normal and breath sounds normal.   Abdominal: Soft. Bowel sounds are normal.   Musculoskeletal: Normal range of motion. He exhibits no edema.   Neurological: He is alert and oriented to person, place, and time.   Skin: Skin is warm. Lesion noted.   RIGHT great toe black with eschar exposed bone   Psychiatric: He has a normal mood and affect. Thought content normal.   Vitals reviewed.        Assessment & Plan     Independent Review of Radiographic Studies:  Detailed discussion regarding risks, benefits, and treatment plan. Images independently reviewed. Patient understands, agrees, and wishes to proceed with plan.     1. 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 Patient is to notify Heart and Vascular center for immediate evaluation.   Return 6 weeks  - Doppler Ankle Brachial Index Single Level CAR; Future    2. Mixed hyperlipidemia  Lipid-lowering therapy has been proven beneficial in patients with cardio-vascular disease. Current guidelines recommend statin treatment for all patients with PAD,CAD and carotid stenosis. Statins are beneficial in preventing cardiovascular events, increasing functional capacity and lower the risk of adverse limb loss in PAD. Statins decrease the progression of plaque formation and may improve  peripheral vessel lining, and aid in reversing atherosclerosis.      3. Type 2 diabetes mellitus with foot ulcer and gangrene (CMS/HCC)      4. Tobacco dependence  Smoking cessation assistance options offered including behavioral counseling (Smoking Cessation Classes), Nicotine replacement therapy (patches or gum), pharmacologic therapy (Chantix, Wellbutrin). Understands tobacco increases risk of expanding AAA, MI, CVA, PAD, carcinoma. Discussion and question answer period 5-7 minutes.               This document has been electronically signed by KIMBERLY Villalpando on May 27, 2020 11:30

## 2020-05-28 ENCOUNTER — ANESTHESIA (OUTPATIENT)
Dept: PERIOP | Facility: HOSPITAL | Age: 65
End: 2020-05-28

## 2020-05-28 ENCOUNTER — ANESTHESIA EVENT (OUTPATIENT)
Dept: PERIOP | Facility: HOSPITAL | Age: 65
End: 2020-05-28

## 2020-05-28 ENCOUNTER — HOSPITAL ENCOUNTER (OUTPATIENT)
Facility: HOSPITAL | Age: 65
Setting detail: HOSPITAL OUTPATIENT SURGERY
Discharge: HOME | End: 2020-05-28
Attending: PODIATRIST | Admitting: PODIATRIST

## 2020-05-28 VITALS
SYSTOLIC BLOOD PRESSURE: 147 MMHG | DIASTOLIC BLOOD PRESSURE: 67 MMHG | BODY MASS INDEX: 23.01 KG/M2 | OXYGEN SATURATION: 94 % | HEART RATE: 70 BPM | TEMPERATURE: 97.7 F | RESPIRATION RATE: 18 BRPM | HEIGHT: 70 IN | WEIGHT: 160.72 LBS

## 2020-05-28 DIAGNOSIS — I96 GANGRENE OF TOE OF RIGHT FOOT (HCC): ICD-10-CM

## 2020-05-28 LAB
ANION GAP SERPL CALCULATED.3IONS-SCNC: 11 MMOL/L (ref 5–15)
BUN BLD-MCNC: 23 MG/DL (ref 8–23)
BUN/CREAT SERPL: 5.4 (ref 7–25)
CALCIUM SPEC-SCNC: 8.6 MG/DL (ref 8.6–10.5)
CHLORIDE SERPL-SCNC: 96 MMOL/L (ref 98–107)
CO2 SERPL-SCNC: 29 MMOL/L (ref 22–29)
CREAT BLD-MCNC: 4.25 MG/DL (ref 0.76–1.27)
GFR SERPL CREATININE-BSD FRML MDRD: 14 ML/MIN/1.73
GFR SERPL CREATININE-BSD FRML MDRD: ABNORMAL ML/MIN/{1.73_M2}
GLUCOSE BLD-MCNC: 170 MG/DL (ref 65–99)
GLUCOSE BLDC GLUCOMTR-MCNC: 177 MG/DL (ref 70–130)
GLUCOSE BLDC GLUCOMTR-MCNC: 249 MG/DL (ref 70–130)
POTASSIUM BLD-SCNC: 4.6 MMOL/L (ref 3.5–5.2)
SODIUM BLD-SCNC: 136 MMOL/L (ref 136–145)

## 2020-05-28 PROCEDURE — 25010000002 MIDAZOLAM PER 1 MG: Performed by: NURSE ANESTHETIST, CERTIFIED REGISTERED

## 2020-05-28 PROCEDURE — 25010000002 ONDANSETRON PER 1 MG: Performed by: NURSE ANESTHETIST, CERTIFIED REGISTERED

## 2020-05-28 PROCEDURE — 93010 ELECTROCARDIOGRAM REPORT: CPT | Performed by: INTERNAL MEDICINE

## 2020-05-28 PROCEDURE — 82962 GLUCOSE BLOOD TEST: CPT

## 2020-05-28 PROCEDURE — 25010000003 CEFAZOLIN PER 500 MG: Performed by: NURSE ANESTHETIST, CERTIFIED REGISTERED

## 2020-05-28 PROCEDURE — 88311 DECALCIFY TISSUE: CPT

## 2020-05-28 PROCEDURE — 80048 BASIC METABOLIC PNL TOTAL CA: CPT | Performed by: ANESTHESIOLOGY

## 2020-05-28 PROCEDURE — 88305 TISSUE EXAM BY PATHOLOGIST: CPT

## 2020-05-28 PROCEDURE — 93005 ELECTROCARDIOGRAM TRACING: CPT | Performed by: ANESTHESIOLOGY

## 2020-05-28 PROCEDURE — 94640 AIRWAY INHALATION TREATMENT: CPT

## 2020-05-28 PROCEDURE — 25010000002 PROPOFOL 10 MG/ML EMULSION: Performed by: NURSE ANESTHETIST, CERTIFIED REGISTERED

## 2020-05-28 PROCEDURE — 28820 AMPUTATION OF TOE: CPT | Performed by: PODIATRIST

## 2020-05-28 RX ORDER — BUPIVACAINE HYDROCHLORIDE 5 MG/ML
INJECTION, SOLUTION EPIDURAL; INTRACAUDAL AS NEEDED
Status: DISCONTINUED | OUTPATIENT
Start: 2020-05-28 | End: 2020-05-28 | Stop reason: HOSPADM

## 2020-05-28 RX ORDER — ACETAMINOPHEN 325 MG/1
650 TABLET ORAL ONCE AS NEEDED
Status: DISCONTINUED | OUTPATIENT
Start: 2020-05-28 | End: 2020-05-28 | Stop reason: HOSPADM

## 2020-05-28 RX ORDER — IPRATROPIUM BROMIDE AND ALBUTEROL SULFATE 2.5; .5 MG/3ML; MG/3ML
3 SOLUTION RESPIRATORY (INHALATION) ONCE
Status: COMPLETED | OUTPATIENT
Start: 2020-05-28 | End: 2020-05-28

## 2020-05-28 RX ORDER — MIDAZOLAM HYDROCHLORIDE 1 MG/ML
INJECTION INTRAMUSCULAR; INTRAVENOUS AS NEEDED
Status: DISCONTINUED | OUTPATIENT
Start: 2020-05-28 | End: 2020-05-28 | Stop reason: SURG

## 2020-05-28 RX ORDER — PROMETHAZINE HYDROCHLORIDE 25 MG/1
25 SUPPOSITORY RECTAL ONCE AS NEEDED
Status: DISCONTINUED | OUTPATIENT
Start: 2020-05-28 | End: 2020-05-28 | Stop reason: HOSPADM

## 2020-05-28 RX ORDER — PROMETHAZINE HYDROCHLORIDE 25 MG/1
25 TABLET ORAL ONCE AS NEEDED
Status: DISCONTINUED | OUTPATIENT
Start: 2020-05-28 | End: 2020-05-28 | Stop reason: HOSPADM

## 2020-05-28 RX ORDER — MEPERIDINE HYDROCHLORIDE 25 MG/ML
12.5 INJECTION INTRAMUSCULAR; INTRAVENOUS; SUBCUTANEOUS
Status: DISCONTINUED | OUTPATIENT
Start: 2020-05-28 | End: 2020-05-28 | Stop reason: HOSPADM

## 2020-05-28 RX ORDER — EPHEDRINE SULFATE 50 MG/ML
INJECTION, SOLUTION INTRAVENOUS AS NEEDED
Status: DISCONTINUED | OUTPATIENT
Start: 2020-05-28 | End: 2020-05-28 | Stop reason: SURG

## 2020-05-28 RX ORDER — ONDANSETRON 2 MG/ML
INJECTION INTRAMUSCULAR; INTRAVENOUS AS NEEDED
Status: DISCONTINUED | OUTPATIENT
Start: 2020-05-28 | End: 2020-05-28 | Stop reason: SURG

## 2020-05-28 RX ORDER — FLUMAZENIL 0.1 MG/ML
0.2 INJECTION INTRAVENOUS AS NEEDED
Status: DISCONTINUED | OUTPATIENT
Start: 2020-05-28 | End: 2020-05-28 | Stop reason: HOSPADM

## 2020-05-28 RX ORDER — SODIUM CHLORIDE 9 MG/ML
1000 INJECTION, SOLUTION INTRAVENOUS CONTINUOUS
Status: DISCONTINUED | OUTPATIENT
Start: 2020-05-28 | End: 2020-05-28 | Stop reason: HOSPADM

## 2020-05-28 RX ORDER — NALOXONE HCL 0.4 MG/ML
0.4 VIAL (ML) INJECTION AS NEEDED
Status: DISCONTINUED | OUTPATIENT
Start: 2020-05-28 | End: 2020-05-28 | Stop reason: HOSPADM

## 2020-05-28 RX ORDER — LIDOCAINE HYDROCHLORIDE 20 MG/ML
INJECTION, SOLUTION INFILTRATION; PERINEURAL AS NEEDED
Status: DISCONTINUED | OUTPATIENT
Start: 2020-05-28 | End: 2020-05-28 | Stop reason: SURG

## 2020-05-28 RX ORDER — PROMETHAZINE HYDROCHLORIDE 25 MG/ML
12.5 INJECTION, SOLUTION INTRAMUSCULAR; INTRAVENOUS ONCE AS NEEDED
Status: DISCONTINUED | OUTPATIENT
Start: 2020-05-28 | End: 2020-05-28 | Stop reason: HOSPADM

## 2020-05-28 RX ORDER — PROPOFOL 10 MG/ML
VIAL (ML) INTRAVENOUS AS NEEDED
Status: DISCONTINUED | OUTPATIENT
Start: 2020-05-28 | End: 2020-05-28 | Stop reason: SURG

## 2020-05-28 RX ORDER — CEFAZOLIN SODIUM 1 G/3ML
INJECTION, POWDER, FOR SOLUTION INTRAMUSCULAR; INTRAVENOUS AS NEEDED
Status: DISCONTINUED | OUTPATIENT
Start: 2020-05-28 | End: 2020-05-28 | Stop reason: SURG

## 2020-05-28 RX ORDER — ONDANSETRON 2 MG/ML
4 INJECTION INTRAMUSCULAR; INTRAVENOUS ONCE AS NEEDED
Status: DISCONTINUED | OUTPATIENT
Start: 2020-05-28 | End: 2020-05-28 | Stop reason: HOSPADM

## 2020-05-28 RX ORDER — LABETALOL HYDROCHLORIDE 5 MG/ML
5 INJECTION, SOLUTION INTRAVENOUS
Status: DISCONTINUED | OUTPATIENT
Start: 2020-05-28 | End: 2020-05-28 | Stop reason: HOSPADM

## 2020-05-28 RX ORDER — ACETAMINOPHEN 650 MG/1
650 SUPPOSITORY RECTAL ONCE AS NEEDED
Status: DISCONTINUED | OUTPATIENT
Start: 2020-05-28 | End: 2020-05-28 | Stop reason: HOSPADM

## 2020-05-28 RX ORDER — DIPHENHYDRAMINE HYDROCHLORIDE 50 MG/ML
12.5 INJECTION INTRAMUSCULAR; INTRAVENOUS
Status: DISCONTINUED | OUTPATIENT
Start: 2020-05-28 | End: 2020-05-28 | Stop reason: HOSPADM

## 2020-05-28 RX ORDER — EPHEDRINE SULFATE 50 MG/ML
5 INJECTION, SOLUTION INTRAVENOUS ONCE AS NEEDED
Status: DISCONTINUED | OUTPATIENT
Start: 2020-05-28 | End: 2020-05-28 | Stop reason: HOSPADM

## 2020-05-28 RX ADMIN — PROPOFOL 100 MG: 10 INJECTION, EMULSION INTRAVENOUS at 08:09

## 2020-05-28 RX ADMIN — ONDANSETRON 4 MG: 2 INJECTION INTRAMUSCULAR; INTRAVENOUS at 08:48

## 2020-05-28 RX ADMIN — MIDAZOLAM HYDROCHLORIDE 1 MG: 2 INJECTION, SOLUTION INTRAMUSCULAR; INTRAVENOUS at 08:04

## 2020-05-28 RX ADMIN — SODIUM CHLORIDE 1000 ML: 9 INJECTION, SOLUTION INTRAVENOUS at 07:44

## 2020-05-28 RX ADMIN — EPHEDRINE SULFATE 10 MG: 50 INJECTION INTRAVENOUS at 08:29

## 2020-05-28 RX ADMIN — MIDAZOLAM HYDROCHLORIDE 1 MG: 2 INJECTION, SOLUTION INTRAMUSCULAR; INTRAVENOUS at 08:07

## 2020-05-28 RX ADMIN — IPRATROPIUM BROMIDE AND ALBUTEROL SULFATE 3 ML: 2.5; .5 SOLUTION RESPIRATORY (INHALATION) at 07:44

## 2020-05-28 RX ADMIN — CEFAZOLIN SODIUM 2 G: 1 INJECTION, POWDER, FOR SOLUTION INTRAMUSCULAR; INTRAVENOUS at 08:21

## 2020-05-28 RX ADMIN — LIDOCAINE HYDROCHLORIDE 60 MG: 20 INJECTION, SOLUTION INFILTRATION; PERINEURAL at 08:09

## 2020-05-28 NOTE — ANESTHESIA PREPROCEDURE EVALUATION
Anesthesia Evaluation     no history of anesthetic complications:  NPO Solid Status: > 8 hours  NPO Liquid Status: > 2 hours           Airway   Mallampati: I  TM distance: >3 FB  Neck ROM: full  No difficulty expected  Dental    (+) edentulous and upper dentures    Pulmonary    (+) a smoker (1 ppd) Current Smoked day of surgery, decreased breath sounds,   (-) COPD, asthma, sleep apnea  Cardiovascular   Exercise tolerance: poor (<4 METS)    ECG reviewed  PT is on anticoagulation therapy  Patient on routine beta blocker and Beta blocker given within 24 hours of surgery  Rhythm: regular  Rate: normal    (+) hypertension well controlled 2 medications or greater, past MI (states from hypokalemia)  >12 months, murmur, PVD, hyperlipidemia,   (-) valvular problems/murmurs, CAD, dysrhythmias, angina, CHF, cardiac stents, CABG    ROS comment: Normal sinus rhythm  Left axis deviation  Prolonged QT  Abnormal ECG  When compared with ECG of 29-DEC-2018 06:15,  T wave inversion no longer evident in Lateral leads    · Preserved systolic biventricular function. LV EF: 65%. Concentric hypertrophy with normal diastolic function.  · No significant valvular abnormalities.    Neuro/Psych  (-) seizures, TIA, CVA, headaches, psychiatric history  GI/Hepatic/Renal/Endo    (+)  GERD well controlled,  renal disease (MWF) dialysis, diabetes mellitus (glu 177) type 2 well controlled using insulin,   (-) hepatitis, liver disease    Musculoskeletal         ROS comment: Right foot gangrece  Abdominal    Substance History   (-) alcohol use, drug use     OB/GYN          Other        (-) history of cancer  ROS/Med Hx Other: Did NOT stop plavix                  Anesthesia Plan    ASA 3     general   (Duo neb ordered)  intravenous induction     Anesthetic plan, all risks, benefits, and alternatives have been provided, discussed and informed consent has been obtained with: patient and spouse/significant other.

## 2020-05-28 NOTE — ANESTHESIA PROCEDURE NOTES
Airway  Urgency: elective    Date/Time: 5/28/2020 8:08 AM  Airway not difficult    General Information and Staff    Patient location during procedure: OR  CRNA: Erinn Dang CRNA    Indications and Patient Condition  Indications for airway management: airway protection    Preoxygenated: yes  MILS not maintained throughout  Mask difficulty assessment: 1 - vent by mask    Final Airway Details  Final airway type: supraglottic airway      Successful airway: I-gel  Size 4    Number of attempts at approach: 1  Assessment: lips, teeth, and gum same as pre-op and atraumatic intubation

## 2020-05-28 NOTE — ANESTHESIA POSTPROCEDURE EVALUATION
Patient: Darwin Morrell    Procedure Summary     Date:  05/28/20 Room / Location:  [facility] OR 08 / [facility] OR    Anesthesia Start:  0805 Anesthesia Stop:  0858    Procedure:  HULLUX AMPUTATION AND ALL OTHER INDICATED PROCDURES (Right ) Diagnosis:       Gangrene of toe of right foot (CMS/HCC)      (Gangrene of toe of right foot (CMS/HCC) [I96])    Surgeon:  David Murguia DPM Provider:  Camilo Palomino MD    Anesthesia Type:  general ASA Status:  3          Anesthesia Type: general    Vitals  No vitals data found for the desired time range.          Post Anesthesia Care and Evaluation    Patient location during evaluation: PACU  Patient participation: complete - patient participated  Level of consciousness: awake and awake and alert  Pain score: 0  Pain management: satisfactory to patient  Airway patency: patent  Anesthetic complications: No anesthetic complications  PONV Status: none  Cardiovascular status: acceptable and stable  Respiratory status: acceptable, room air and spontaneous ventilation  Hydration status: acceptable

## 2020-06-01 ENCOUNTER — OFFICE VISIT (OUTPATIENT)
Dept: PODIATRY | Facility: CLINIC | Age: 65
End: 2020-06-01

## 2020-06-01 VITALS — WEIGHT: 160 LBS | OXYGEN SATURATION: 96 % | HEIGHT: 70 IN | HEART RATE: 80 BPM | BODY MASS INDEX: 22.9 KG/M2

## 2020-06-01 DIAGNOSIS — I96 GANGRENE OF TOE OF RIGHT FOOT (HCC): Primary | ICD-10-CM

## 2020-06-01 PROCEDURE — 99024 POSTOP FOLLOW-UP VISIT: CPT | Performed by: PODIATRIST

## 2020-06-01 NOTE — OP NOTE
Date of Procedure: 05/28/2020     SURGEON: David Murguia DPM      ASSISTANT: VINH Linn      PREOPERATIVE DIAGNOSIS: Gangrene right hallux     POSTOPERATIVE DIAGNOSIS: Same as preop     PROCEDURES PERFORMED: right hallux amputation     ANESTHESIA: General     HEMOSTASIS: Direct pressure     ESTIMATED BLOOD LOSS: 5 mL.     SPECIMEN: Bone and tissue right great toe     MATERIALS: None     INJECTABLES: 10 cc half percent Marcaine plain     COMPLICATIONS: None.      CONDITION: Stable.      INDICATIONS FOR PROCEDURE: This is a patient of mine who has gangrene to right hallux.  Was recently revascularized by cardiovascular surgery.  Surgical plan is for hallux amputation.  He agrees to undergo  surgical intervention at this time. Consent is signed and documented in the chart. History and physical exam were reviewed prior to patient being taken to the operating room and n.p.o. status was confirmed. No guarantees were given or implied regarding the outcome of this treatment.      DESCRIPTION OF PROCEDURE: After mild sedation was administered by the anesthesia team in the preoperative holding area the patient was transported to the operating room and placed in a supine position.  General anesthesia was then administered and the left foot was prepped and draped in usual sterile technique and a timeout was performed to confirm the correct patient, correct site, and correct procedure.      Attention was then directed to the right foot where 2 converging semielliptical incisions were made around the base of the great toe.  Dissection sharply carried down to the level of the MTP and great toe was sharply disarticulated.  Bone and tissue will be sent to pathology for analysis.  Suboptimal bleeding noted.  Operative site was flushed copious amounts of normal saline and closed with 3-0 Vicryl and 4-0 nylon.  Sterile dressing was applied.  Patient tolerated the procedure and anesthesia well and was transported from the  operating room to the PACU with vital signs stable and neurovascular status intact to the operative extremity.    The plan is to discharge patient home once stable per anesthesia.  Weightbearing as tolerated in surgical shoe.  Keep dressing clean, dry and intact.  Follow-up Monday, June 1, 2020.

## 2020-06-01 NOTE — PROGRESS NOTES
Darwin Morrell  1955  65 y.o. male   M. SHELIA Tovar - 5/12/2020  BS -  90 per pt    Patient presents today for a post op recheck of his right great toe amputation.    06/01/2020     Chief Complaint   Patient presents with   • Right Foot - post op recheck       History of Present Illness    Darwin Morrell is a 65 y.o.male who presents to clinic for his first postoperative visit.  Patient had right hallux amputation secondary to gangrene on May 28.  Dressing is clean, dry and intact.  Denies pain.    Past Medical History:   Diagnosis Date   • Callus    • Diabetes mellitus (CMS/HCC)    • ESRD (end stage renal disease) (CMS/HCC)    • GERD (gastroesophageal reflux disease)    • Hyperlipidemia    • Hypertension    • Smoker          Past Surgical History:   Procedure Laterality Date   • AMPUTATION     • AMPUTATION DIGIT Right 5/28/2020    Procedure: HULLUX AMPUTATION AND ALL OTHER INDICATED PROCDURES;  Surgeon: David Murguia DPM;  Location: [facility] OR;  Service: Podiatry;  Laterality: Right;   • APPENDECTOMY     • ARTERIOGRAM N/A 5/14/2020    Procedure: abdominal aortogram bilateral lower extremity runoff possible angioplasty stent thrombolysis atherectomy;  Surgeon: Ruben Gustafson MD;  Location: [facility] ANGIO INVASIVE LOCATION;  Service: Interventional Radiology;  Laterality: N/A;   • ARTERIOVENOUS FISTULA Right 02/2019   • CAROTID ENDARTERECTOMY Right          Family History   Problem Relation Age of Onset   • Diabetes Mother    • Diabetes Father    • Diabetes Sister    • Cancer Brother    • Diabetes Brother        No Known Allergies    Social History     Socioeconomic History   • Marital status:      Spouse name: Not on file   • Number of children: Not on file   • Years of education: Not on file   • Highest education level: Not on file   Tobacco Use   • Smoking status: Current Every Day Smoker     Packs/day: 1.00     Years: 55.00     Pack years: 55.00   • Smokeless tobacco: Former User    "  Substance and Sexual Activity   • Alcohol use: Yes     Frequency: Never     Comment: used to drink   • Drug use: No   • Sexual activity: Defer         Current Outpatient Medications   Medication Sig Dispense Refill   • aspirin 81 MG EC tablet Take 1 tablet by mouth Daily. 150 tablet 2   • atorvastatin (LIPITOR) 80 MG tablet Take 1 tablet by mouth Every Night. 30 tablet 0   • calcitriol (ROCALTROL) 0.5 MCG capsule Take 1 capsule by mouth Daily. 30 capsule 0   • calcium acetate (PHOS BINDER,) 667 MG capsule capsule Take 1,334 mg by mouth 3 (Three) Times a Day.     • carvedilol (COREG) 12.5 MG tablet Take 12.5 mg by mouth 2 (Two) Times a Day With Meals.     • clopidogrel (PLAVIX) 75 MG tablet Take 1 tablet by mouth Daily. 30 tablet 11   • HYDROcodone-acetaminophen (NORCO) 7.5-325 MG per tablet Take 1 tablet by mouth Every 8 (Eight) Hours As Needed for Moderate Pain .     • insulin detemir (LEVEMIR) 100 UNIT/ML injection Inject 20 Units under the skin into the appropriate area as directed Every Night. 10 mL 0   • insulin lispro (humaLOG) 100 UNIT/ML injection Inject 10 Units under the skin into the appropriate area as directed 3 (Three) Times a Day Before Meals. Sliding scale     • losartan (COZAAR) 25 MG tablet Take 25 mg by mouth Daily.     • nitroglycerin (NITROSTAT) 0.4 MG SL tablet Place 1 tablet under the tongue Every 5 (Five) Minutes As Needed for Chest Pain. Take no more than 3 doses in 15 minutes. 100 tablet 12   • omeprazole (priLOSEC) 20 MG capsule Take 20 mg by mouth Daily.     • torsemide (DEMADEX) 20 MG tablet Take 20 mg by mouth 2 (Two) Times a Day.       No current facility-administered medications for this visit.        Review of Systems   Constitutional: Negative.    HENT: Negative.    Eyes: Positive for visual disturbance.   Respiratory: Negative.    Cardiovascular: Negative.    Gastrointestinal: Negative.    Psychiatric/Behavioral: Negative.          OBJECTIVE    Pulse 80   Ht 177.8 cm (70\")   Wt " 72.6 kg (160 lb)   SpO2 96%   BMI 22.96 kg/m²       Physical Exam   Constitutional: He is oriented to person, place, and time. He appears well-developed and well-nourished. No distress.   HENT:   Head: Normocephalic and atraumatic.   Nose: Nose normal.   Pulmonary/Chest: Effort normal. No respiratory distress.   Musculoskeletal: Normal range of motion. He exhibits no edema.   Neurological: He is alert and oriented to person, place, and time.   Psychiatric: He has a normal mood and affect. His behavior is normal. Thought content normal.   Vitals reviewed.      Lower Extremity: Incision site well approximated.  Moderate edema.  No surrounding erythema.  CFT is immediate to distal digits.  Negative Homans.  Sensation intact light touch.    Procedures        ASSESSMENT AND PLAN    Darwin was seen today for post op recheck.    Diagnoses and all orders for this visit:    Gangrene of toe of right foot (CMS/HCC)      -Patient is doing well postoperatively.  Dressing change performed.  Keep clean, dry and intact.  Recheck 1 week.          This document has been electronically signed by David Murguia DPM on June 1, 2020 14:46     6/1/2020  14:46

## 2020-06-02 LAB
LAB AP CASE REPORT: NORMAL
PATH REPORT.FINAL DX SPEC: NORMAL

## 2020-06-10 ENCOUNTER — OFFICE VISIT (OUTPATIENT)
Dept: PODIATRY | Facility: CLINIC | Age: 65
End: 2020-06-10

## 2020-06-10 VITALS — HEIGHT: 70 IN | BODY MASS INDEX: 22.9 KG/M2 | WEIGHT: 160 LBS | OXYGEN SATURATION: 99 % | HEART RATE: 80 BPM

## 2020-06-10 DIAGNOSIS — I96 GANGRENE OF TOE OF RIGHT FOOT (HCC): Primary | ICD-10-CM

## 2020-06-10 DIAGNOSIS — T87.81 DEHISCENCE OF AMPUTATION STUMP (HCC): ICD-10-CM

## 2020-06-10 PROCEDURE — 99024 POSTOP FOLLOW-UP VISIT: CPT | Performed by: PODIATRIST

## 2020-06-15 ENCOUNTER — OFFICE VISIT (OUTPATIENT)
Dept: PODIATRY | Facility: CLINIC | Age: 65
End: 2020-06-15

## 2020-06-15 VITALS — HEIGHT: 70 IN | WEIGHT: 160 LBS | BODY MASS INDEX: 22.9 KG/M2 | HEART RATE: 72 BPM | OXYGEN SATURATION: 99 %

## 2020-06-15 DIAGNOSIS — I96 GANGRENE OF TOE OF RIGHT FOOT (HCC): Primary | ICD-10-CM

## 2020-06-15 DIAGNOSIS — I73.9 PVD (PERIPHERAL VASCULAR DISEASE) (HCC): ICD-10-CM

## 2020-06-15 DIAGNOSIS — T87.81 DEHISCENCE OF AMPUTATION STUMP (HCC): ICD-10-CM

## 2020-06-15 PROCEDURE — 99024 POSTOP FOLLOW-UP VISIT: CPT | Performed by: PODIATRIST

## 2020-06-15 NOTE — PROGRESS NOTES
Darwin Morrell  1955  65 y.o. male   M. SHELIA Tovar - 5/12/2020  BS -  138 per pt    Patient presents today for a post op recheck of his right great toe amputation.    06/15/2020     Chief Complaint   Patient presents with   • Right Foot - Post-op       History of Present Illness    Darwin Morrell is a 65 y.o.male who presents to clinic for his third postoperative visit.  Patient had right hallux amputation secondary to gangrene on May 28.       Past Medical History:   Diagnosis Date   • Callus    • Diabetes mellitus (CMS/HCC)    • ESRD (end stage renal disease) (CMS/HCC)    • GERD (gastroesophageal reflux disease)    • Hyperlipidemia    • Hypertension    • Smoker          Past Surgical History:   Procedure Laterality Date   • AMPUTATION     • AMPUTATION DIGIT Right 5/28/2020    Procedure: HULLUX AMPUTATION AND ALL OTHER INDICATED PROCDURES;  Surgeon: David Murguia DPM;  Location: [facility] OR;  Service: Podiatry;  Laterality: Right;   • APPENDECTOMY     • ARTERIOGRAM N/A 5/14/2020    Procedure: abdominal aortogram bilateral lower extremity runoff possible angioplasty stent thrombolysis atherectomy;  Surgeon: Ruben Gustafson MD;  Location: [facility] ANGIO INVASIVE LOCATION;  Service: Interventional Radiology;  Laterality: N/A;   • ARTERIOVENOUS FISTULA Right 02/2019   • CAROTID ENDARTERECTOMY Right          Family History   Problem Relation Age of Onset   • Diabetes Mother    • Diabetes Father    • Diabetes Sister    • Cancer Brother    • Diabetes Brother        No Known Allergies    Social History     Socioeconomic History   • Marital status:      Spouse name: Not on file   • Number of children: Not on file   • Years of education: Not on file   • Highest education level: Not on file   Tobacco Use   • Smoking status: Current Every Day Smoker     Packs/day: 1.00     Years: 55.00     Pack years: 55.00   • Smokeless tobacco: Former User   Substance and Sexual Activity   • Alcohol use: Yes      "Frequency: Never     Comment: used to drink   • Drug use: No   • Sexual activity: Defer         Current Outpatient Medications   Medication Sig Dispense Refill   • aspirin 81 MG EC tablet Take 1 tablet by mouth Daily. 150 tablet 2   • atorvastatin (LIPITOR) 80 MG tablet Take 1 tablet by mouth Every Night. 30 tablet 0   • calcitriol (ROCALTROL) 0.5 MCG capsule Take 1 capsule by mouth Daily. 30 capsule 0   • calcium acetate (PHOS BINDER,) 667 MG capsule capsule Take 1,334 mg by mouth 3 (Three) Times a Day.     • carvedilol (COREG) 12.5 MG tablet Take 12.5 mg by mouth 2 (Two) Times a Day With Meals.     • clopidogrel (PLAVIX) 75 MG tablet Take 1 tablet by mouth Daily. 30 tablet 11   • HYDROcodone-acetaminophen (NORCO) 7.5-325 MG per tablet Take 1 tablet by mouth Every 8 (Eight) Hours As Needed for Moderate Pain .     • insulin detemir (LEVEMIR) 100 UNIT/ML injection Inject 20 Units under the skin into the appropriate area as directed Every Night. 10 mL 0   • insulin lispro (humaLOG) 100 UNIT/ML injection Inject 10 Units under the skin into the appropriate area as directed 3 (Three) Times a Day Before Meals. Sliding scale     • losartan (COZAAR) 25 MG tablet Take 25 mg by mouth Daily.     • nitroglycerin (NITROSTAT) 0.4 MG SL tablet Place 1 tablet under the tongue Every 5 (Five) Minutes As Needed for Chest Pain. Take no more than 3 doses in 15 minutes. 100 tablet 12   • omeprazole (priLOSEC) 20 MG capsule Take 20 mg by mouth Daily.     • torsemide (DEMADEX) 20 MG tablet Take 20 mg by mouth 2 (Two) Times a Day.       No current facility-administered medications for this visit.        Review of Systems   Constitutional: Negative.    HENT: Negative.    Eyes: Positive for visual disturbance.   Respiratory: Negative.    Cardiovascular: Negative.    Gastrointestinal: Negative.    Psychiatric/Behavioral: Negative.          OBJECTIVE    Pulse 72   Ht 177.8 cm (70\")   Wt 72.6 kg (160 lb)   SpO2 99%   BMI 22.96 kg/m²       "       Physical Exam   Constitutional: He is oriented to person, place, and time. He appears well-developed and well-nourished. No distress.   HENT:   Head: Normocephalic and atraumatic.   Nose: Nose normal.   Pulmonary/Chest: Effort normal. No respiratory distress.   Musculoskeletal: Normal range of motion. He exhibits no edema.   Neurological: He is alert and oriented to person, place, and time.   Psychiatric: He has a normal mood and affect. His behavior is normal. Thought content normal.   Vitals reviewed.      Lower Extremity: Incision site is dehisced centrally.  Medial and lateral borders are healed.  Improved edema.  No surrounding erythema.  CFT is immediate to distal digits.  Negative Homans.  Sensation intact light touch.    Procedures        ASSESSMENT AND PLAN    Darwin was seen today for post-op.    Diagnoses and all orders for this visit:    Gangrene of toe of right foot (CMS/HCC)    PVD (peripheral vascular disease) (CMS/HCC)    Dehiscence of amputation stump (CMS/HCC)      -Incision site improving.  Remaining sutures removed.  Debridement performed.  Continue daily dressing changes.  Recheck 2 weeks          This document has been electronically signed by David Murguia DPM on Maribell 15, 2020 10:59     6/15/2020  10:59

## 2020-07-01 ENCOUNTER — OFFICE VISIT (OUTPATIENT)
Dept: CARDIAC SURGERY | Facility: CLINIC | Age: 65
End: 2020-07-01

## 2020-07-01 VITALS
DIASTOLIC BLOOD PRESSURE: 73 MMHG | BODY MASS INDEX: 23.19 KG/M2 | WEIGHT: 162 LBS | HEART RATE: 70 BPM | OXYGEN SATURATION: 98 % | SYSTOLIC BLOOD PRESSURE: 140 MMHG | HEIGHT: 70 IN

## 2020-07-01 DIAGNOSIS — E78.2 MIXED HYPERLIPIDEMIA: ICD-10-CM

## 2020-07-01 DIAGNOSIS — N18.6 END-STAGE RENAL DISEASE ON HEMODIALYSIS (HCC): ICD-10-CM

## 2020-07-01 DIAGNOSIS — E11.621 TYPE 2 DIABETES MELLITUS WITH FOOT ULCER AND GANGRENE (HCC): ICD-10-CM

## 2020-07-01 DIAGNOSIS — I73.9 PVD (PERIPHERAL VASCULAR DISEASE) (HCC): Primary | ICD-10-CM

## 2020-07-01 DIAGNOSIS — I10 BENIGN ESSENTIAL HYPERTENSION: ICD-10-CM

## 2020-07-01 DIAGNOSIS — E11.52 TYPE 2 DIABETES MELLITUS WITH FOOT ULCER AND GANGRENE (HCC): ICD-10-CM

## 2020-07-01 DIAGNOSIS — F17.200 TOBACCO DEPENDENCE: ICD-10-CM

## 2020-07-01 DIAGNOSIS — Z99.2 END-STAGE RENAL DISEASE ON HEMODIALYSIS (HCC): ICD-10-CM

## 2020-07-01 DIAGNOSIS — L97.509 TYPE 2 DIABETES MELLITUS WITH FOOT ULCER AND GANGRENE (HCC): ICD-10-CM

## 2020-07-01 PROBLEM — I99.8 ISCHEMIA OF TOE: Status: RESOLVED | Noted: 2020-05-05 | Resolved: 2020-07-01

## 2020-07-01 PROCEDURE — 99214 OFFICE O/P EST MOD 30 MIN: CPT | Performed by: NURSE PRACTITIONER

## 2020-07-01 NOTE — PROGRESS NOTES
CVTS Office Progress Note       Subjective   Patient ID: Darwin Morrell is a 65 y.o. male is being seen for follow up.  Chief Complaint:    Chief Complaint   Patient presents with   • Follow-up   • Peripheral Vascular Disease       The following portions of the patient's history were reviewed and updated as appropriate: allergies, current medications, past family history, past medical history, past social history, past surgical history and problem list.  Recent images independently reviewed.  Available laboratory values reviewed.    PCP:  Morgan Tovar MD   Nephrology: [facility] MWF     65 y.o. male with HTN(stable, increased risk stroke, rupture), Hyperlipidemia(stable, increased risk cardiovascular events), Diabetes Mellitus(stable, increased risk cardiovascular events), Smoker(uncontrolled, increased risk cardiovascular events) and Chronic Kidney Disease stage 5(stable, on dialysis)  Diabetic Ulcer RIGHT great toe. Callus x 6 weeks, now with dry gangrene. Has been soaking (epsom salts) at home and applying antibiotic cream.  smokes 1 PPD.  No TIA stroke amaurosis.  No MI claudication. No other associated signs, symptoms or modifying factors. Urgent vascular consultation requested.    5/5/2020:  TOMMY: RIGHT 0.5 Biphasic (CFV-DP) LEFT 0.78 Tri/Biphasic (DP/PT)  5/2020 CTA Aorta runoff:  RIGHT external iliac artery 80%, SFA moderate diffuse disease, 3v runoff.  LEFT CFA 70%, SFA 90% prox 70% distal. 2v runoff.  5/14/2020: PTA/Stent RIGHT EIA  5/21/2020 TOMMY: RIGHT 1.0 Tri/Biphasic(PT/DP)  LEFT 0.7 Tri/Biphasic (PT)  7/1/2020:  TOMMY: RIGHT 0.95 Triphasic LEFT 0.71 Tri/Biphasic (Pop/DP)    2/2018 Echocardiogram:  EF 60%, LA 40mm, LV 48mm, RVSP 39mmHg.  Tr MR TR.      Past Medical History:   Diagnosis Date   • Callus    • Diabetes mellitus (CMS/HCC)    • ESRD (end stage renal disease) (CMS/HCC)    • GERD (gastroesophageal reflux disease)    • Hyperlipidemia    • Hypertension    • Smoker           Past Surgical History:   Procedure Laterality Date   • AMPUTATION     • AMPUTATION DIGIT Right 5/28/2020    Procedure: HULLUX AMPUTATION AND ALL OTHER INDICATED PROCDURES;  Surgeon: David Murguia DPM;  Location: [facility] OR;  Service: Podiatry;  Laterality: Right;   • APPENDECTOMY     • ARTERIOGRAM N/A 5/14/2020    Procedure: abdominal aortogram bilateral lower extremity runoff possible angioplasty stent thrombolysis atherectomy;  Surgeon: Ruben Gustafson MD;  Location: [facility] ANGIO INVASIVE LOCATION;  Service: Interventional Radiology;  Laterality: N/A;   • ARTERIOVENOUS FISTULA Right 02/2019   • CAROTID ENDARTERECTOMY Right      Family History   Problem Relation Age of Onset   • Diabetes Mother    • Diabetes Father    • Diabetes Sister    • Cancer Brother    • Diabetes Brother      Social History     Tobacco Use   • Smoking status: Current Every Day Smoker     Packs/day: 1.00     Years: 55.00     Pack years: 55.00   • Smokeless tobacco: Former User   Substance Use Topics   • Alcohol use: Yes     Frequency: Never     Comment: used to drink   • Drug use: No       ALLERGIES:   Patient has no known allergies.    MEDICATIONS:      Current Outpatient Medications:   •  aspirin 81 MG EC tablet, Take 1 tablet by mouth Daily., Disp: 150 tablet, Rfl: 2  •  atorvastatin (LIPITOR) 80 MG tablet, Take 1 tablet by mouth Every Night., Disp: 30 tablet, Rfl: 0  •  calcitriol (ROCALTROL) 0.5 MCG capsule, Take 1 capsule by mouth Daily., Disp: 30 capsule, Rfl: 0  •  calcium acetate (PHOS BINDER,) 667 MG capsule capsule, Take 1,334 mg by mouth 3 (Three) Times a Day., Disp: , Rfl:   •  carvedilol (COREG) 12.5 MG tablet, Take 12.5 mg by mouth 2 (Two) Times a Day With Meals., Disp: , Rfl:   •  clopidogrel (PLAVIX) 75 MG tablet, Take 1 tablet by mouth Daily., Disp: 30 tablet, Rfl: 11  •  HYDROcodone-acetaminophen (NORCO) 7.5-325 MG per tablet, Take 1 tablet by mouth Every 8 (Eight) Hours As Needed for Moderate Pain ., Disp: ,  "Rfl:   •  insulin detemir (LEVEMIR) 100 UNIT/ML injection, Inject 20 Units under the skin into the appropriate area as directed Every Night., Disp: 10 mL, Rfl: 0  •  insulin lispro (humaLOG) 100 UNIT/ML injection, Inject 10 Units under the skin into the appropriate area as directed 3 (Three) Times a Day Before Meals. Sliding scale, Disp: , Rfl:   •  losartan (COZAAR) 25 MG tablet, Take 25 mg by mouth Daily., Disp: , Rfl:   •  nitroglycerin (NITROSTAT) 0.4 MG SL tablet, Place 1 tablet under the tongue Every 5 (Five) Minutes As Needed for Chest Pain. Take no more than 3 doses in 15 minutes., Disp: 100 tablet, Rfl: 12  •  omeprazole (priLOSEC) 20 MG capsule, Take 20 mg by mouth Daily., Disp: , Rfl:   •  torsemide (DEMADEX) 20 MG tablet, Take 20 mg by mouth 2 (Two) Times a Day., Disp: , Rfl:     Review of Systems   Constitution: Negative for fever.   Eyes: Negative for visual disturbance.   Cardiovascular: Negative for claudication, cyanosis and leg swelling.   Respiratory: Negative for shortness of breath.    Skin: Positive for color change, dry skin, nail changes and poor wound healing.   Musculoskeletal: Positive for arthritis. Negative for muscle weakness.   Gastrointestinal: Negative for dysphagia.   Neurological: Negative for focal weakness, light-headedness, loss of balance, numbness, paresthesias and weakness.   Psychiatric/Behavioral: Negative for altered mental status.   All other systems reviewed and are negative.       Objective   Vitals:    07/01/20 1513   BP: 140/73   BP Location: Left arm   Patient Position: Sitting   Cuff Size: Adult   Pulse: 70   SpO2: 98%   Weight: 73.5 kg (162 lb)   Height: 177.8 cm (70\")     Body mass index is 23.24 kg/m².  Physical Exam   Constitutional: He is oriented to person, place, and time. He appears well-nourished.   HENT:   Head: Atraumatic.   Eyes: EOM are normal.   Neck: Neck supple.   Cardiovascular: Normal rate and normal heart sounds.   Pulses:       Dorsalis pedis " pulses are 1+ on the right side, and 1+ on the left side.        Posterior tibial pulses are 1+ on the right side, and 1+ on the left side.   (R) AV Fistula: +thrill/bruit   Pulmonary/Chest: Effort normal and breath sounds normal.   Abdominal: Soft. Bowel sounds are normal.   Musculoskeletal: Normal range of motion. He exhibits no edema.   Neurological: He is alert and oriented to person, place, and time.   Skin: Skin is warm.   (R) great toe amputation: healing well   Psychiatric: He has a normal mood and affect. Thought content normal.   Vitals reviewed.        Assessment & Plan     Independent Review of Radiographic Studies:  Detailed discussion regarding risks, benefits, and treatment plan. Images independently reviewed. Patient understands, agrees, and wishes to proceed with plan.     1. PVD (peripheral vascular disease) (CMS/HCC)  No reduction Arterial Flow right Lower Extremity remained stable   Moderate reduction arterial flow left lower extremity stable  Medical Management: ASA,PLAVIX,STATIN   If signs and symptoms of ischemia should occur including but not limited to pale/blue discoloration of limb, increasing pain with ambulation or at rest, or a non-healing wound. Patient is to notify Heart and Vascular center for immediate evaluation.   Return 3 mos - TOMMY/ Stent Duplex (Office will call and schedule)    - Doppler Ankle Brachial Index Single Level CAR; Future  - Duplex Aorta IVC Iliac Graft Limited CAR; Future    2. Mixed hyperlipidemia  Lipid-lowering therapy has been proven beneficial in patients with cardio-vascular disease. Current guidelines recommend statin treatment for all patients with PAD,CAD and carotid stenosis. Statins are beneficial in preventing cardiovascular events, increasing functional capacity and lower the risk of adverse limb loss in PAD. Statins decrease the progression of plaque formation and may improve peripheral vessel lining, and aid in reversing atherosclerosis.       3.  Benign essential hypertension  controlled    4. Type 2 diabetes mellitus with foot ulcer and gangrene (CMS/HCC)      5. End-stage renal disease on hemodialysis (CMS/[facility])  MWF    6. Tobacco dependence  Smoking cessation assistance options offered including behavioral counseling (Smoking Cessation Classes), Nicotine replacement therapy (patches or gum), pharmacologic therapy (Chantix, Wellbutrin). Understands tobacco increases risk of expanding AAA, MI, CVA, PAD, carcinoma. Discussion and question answer period 5-7 minutes.             This document has been electronically signed by KIMBERLY Villalpando on July 1, 2020 15:42

## 2020-07-01 NOTE — PATIENT INSTRUCTIONS
no reduction Arterial Flow right Lower Extremity remained stable   Moderate reduction arterial flow left lower extremity stable  Medical Management: ASA,PLAVIX,STATIN   If signs and symptoms of ischemia should occur including but not limited to pale/blue discoloration of limb, increasing pain with ambulation or at rest, or a non-healing wound. Patient is to notify Heart and Vascular center for immediate evaluation.   Return 3 mos - TOMMY/ Stent Duplex (Office will call and schedule)    Smoking cessation advised.  Tobacco increases risk of expanding AAA, MI, CVA, PAD, carcinoma.

## 2020-07-07 ENCOUNTER — OFFICE VISIT (OUTPATIENT)
Dept: PODIATRY | Facility: CLINIC | Age: 65
End: 2020-07-07

## 2020-07-07 VITALS — BODY MASS INDEX: 23.19 KG/M2 | WEIGHT: 162 LBS | OXYGEN SATURATION: 99 % | HEART RATE: 73 BPM | HEIGHT: 70 IN

## 2020-07-07 DIAGNOSIS — I73.9 PVD (PERIPHERAL VASCULAR DISEASE) (HCC): ICD-10-CM

## 2020-07-07 DIAGNOSIS — I96 GANGRENE OF TOE OF RIGHT FOOT (HCC): Primary | ICD-10-CM

## 2020-07-07 DIAGNOSIS — T87.81 DEHISCENCE OF AMPUTATION STUMP (HCC): ICD-10-CM

## 2020-07-07 PROCEDURE — 99024 POSTOP FOLLOW-UP VISIT: CPT | Performed by: PODIATRIST

## 2020-07-07 NOTE — PROGRESS NOTES
Darwin Morrell  1955  65 y.o. male   M. SHELIA Tovar - 5/12/2020  BS -  138 per pt    Patient presents today for a post op recheck of his right great toe amputation.     07/07/2020     Chief Complaint   Patient presents with   • Right Foot - Post-op       History of Present Illness    Darwin Morrell is a 65 y.o.male who presents to clinic for his fourth postoperative visit.  Patient had right hallux amputation secondary to gangrene on May 28.       Past Medical History:   Diagnosis Date   • Callus    • Diabetes mellitus (CMS/HCC)    • ESRD (end stage renal disease) (CMS/HCC)    • GERD (gastroesophageal reflux disease)    • Hyperlipidemia    • Hypertension    • Smoker          Past Surgical History:   Procedure Laterality Date   • AMPUTATION     • AMPUTATION DIGIT Right 5/28/2020    Procedure: HULLUX AMPUTATION AND ALL OTHER INDICATED PROCDURES;  Surgeon: David Murguia DPM;  Location: [facility] OR;  Service: Podiatry;  Laterality: Right;   • APPENDECTOMY     • ARTERIOGRAM N/A 5/14/2020    Procedure: abdominal aortogram bilateral lower extremity runoff possible angioplasty stent thrombolysis atherectomy;  Surgeon: Ruben Gustafson MD;  Location: [facility] ANGIO INVASIVE LOCATION;  Service: Interventional Radiology;  Laterality: N/A;   • ARTERIOVENOUS FISTULA Right 02/2019   • CAROTID ENDARTERECTOMY Right          Family History   Problem Relation Age of Onset   • Diabetes Mother    • Diabetes Father    • Diabetes Sister    • Cancer Brother    • Diabetes Brother        No Known Allergies    Social History     Socioeconomic History   • Marital status:      Spouse name: Not on file   • Number of children: Not on file   • Years of education: Not on file   • Highest education level: Not on file   Tobacco Use   • Smoking status: Current Every Day Smoker     Packs/day: 1.00     Years: 55.00     Pack years: 55.00   • Smokeless tobacco: Former User   Substance and Sexual Activity   • Alcohol use: Yes      "Frequency: Never     Comment: used to drink   • Drug use: No   • Sexual activity: Defer         Current Outpatient Medications   Medication Sig Dispense Refill   • aspirin 81 MG EC tablet Take 1 tablet by mouth Daily. 150 tablet 2   • atorvastatin (LIPITOR) 80 MG tablet Take 1 tablet by mouth Every Night. 30 tablet 0   • calcitriol (ROCALTROL) 0.5 MCG capsule Take 1 capsule by mouth Daily. 30 capsule 0   • calcium acetate (PHOS BINDER,) 667 MG capsule capsule Take 1,334 mg by mouth 3 (Three) Times a Day.     • carvedilol (COREG) 12.5 MG tablet Take 12.5 mg by mouth 2 (Two) Times a Day With Meals.     • clopidogrel (PLAVIX) 75 MG tablet Take 1 tablet by mouth Daily. 30 tablet 11   • HYDROcodone-acetaminophen (NORCO) 7.5-325 MG per tablet Take 1 tablet by mouth Every 8 (Eight) Hours As Needed for Moderate Pain .     • insulin detemir (LEVEMIR) 100 UNIT/ML injection Inject 20 Units under the skin into the appropriate area as directed Every Night. 10 mL 0   • insulin lispro (humaLOG) 100 UNIT/ML injection Inject 10 Units under the skin into the appropriate area as directed 3 (Three) Times a Day Before Meals. Sliding scale     • losartan (COZAAR) 25 MG tablet Take 25 mg by mouth Daily.     • nitroglycerin (NITROSTAT) 0.4 MG SL tablet Place 1 tablet under the tongue Every 5 (Five) Minutes As Needed for Chest Pain. Take no more than 3 doses in 15 minutes. 100 tablet 12   • omeprazole (priLOSEC) 20 MG capsule Take 20 mg by mouth Daily.     • torsemide (DEMADEX) 20 MG tablet Take 20 mg by mouth 2 (Two) Times a Day.       No current facility-administered medications for this visit.        Review of Systems   Constitutional: Negative.    HENT: Negative.    Eyes: Positive for visual disturbance.   Respiratory: Negative.    Cardiovascular: Negative.    Gastrointestinal: Negative.    Psychiatric/Behavioral: Negative.          OBJECTIVE    Pulse 73   Ht 177.8 cm (70\")   Wt 73.5 kg (162 lb)   SpO2 99%   BMI 23.24 kg/m²       "       Physical Exam   Constitutional: He is oriented to person, place, and time. He appears well-developed and well-nourished. No distress.   HENT:   Head: Normocephalic and atraumatic.   Nose: Nose normal.   Pulmonary/Chest: Effort normal. No respiratory distress.   Musculoskeletal: Normal range of motion. He exhibits no edema.   Neurological: He is alert and oriented to person, place, and time.   Psychiatric: He has a normal mood and affect. His behavior is normal. Thought content normal.   Vitals reviewed.      Lower Extremity: Incision site healing well. No edema. CFT is immediate to distal digits.  Negative Homans.  Sensation intact light touch.    Procedures        ASSESSMENT AND PLAN    Darwin was seen today for post-op.    Diagnoses and all orders for this visit:    Gangrene of toe of right foot (CMS/HCC)    PVD (peripheral vascular disease) (CMS/HCC)    Dehiscence of amputation stump (CMS/HCC)      -Incision site mostly healed. Continue care as instructed  - brendon 4 weeks           This document has been electronically signed by David Murguia DPM on July 8, 2020 12:08     7/8/2020  12:08

## 2020-08-10 ENCOUNTER — OFFICE VISIT (OUTPATIENT)
Dept: PODIATRY | Facility: CLINIC | Age: 65
End: 2020-08-10

## 2020-08-10 VITALS — HEIGHT: 70 IN | HEART RATE: 77 BPM | WEIGHT: 162 LBS | BODY MASS INDEX: 23.19 KG/M2 | OXYGEN SATURATION: 97 %

## 2020-08-10 DIAGNOSIS — E11.42 TYPE 2 DIABETES MELLITUS WITH PERIPHERAL NEUROPATHY (HCC): ICD-10-CM

## 2020-08-10 DIAGNOSIS — I96 GANGRENE OF TOE OF RIGHT FOOT (HCC): Primary | ICD-10-CM

## 2020-08-10 PROCEDURE — 99024 POSTOP FOLLOW-UP VISIT: CPT | Performed by: PODIATRIST

## 2020-08-10 NOTE — PROGRESS NOTES
Darwin Morrell  1955  65 y.o. male   M. SHELIA Tovar - 5/12/2020  BS -  112 per pt    Patient presents today for a post op recheck of his right great toe amputation.     08/10/2020     Chief Complaint   Patient presents with   • Right Foot - Follow-up       History of Present Illness    Darwin Morrell is a 65 y.o.male who presents to clinic for his postoperative visit.  Patient had right hallux amputation secondary to gangrene on May 28.       Past Medical History:   Diagnosis Date   • Callus    • Diabetes mellitus (CMS/HCC)    • ESRD (end stage renal disease) (CMS/HCC)    • GERD (gastroesophageal reflux disease)    • Hyperlipidemia    • Hypertension    • Smoker          Past Surgical History:   Procedure Laterality Date   • AMPUTATION     • AMPUTATION DIGIT Right 5/28/2020    Procedure: HULLUX AMPUTATION AND ALL OTHER INDICATED PROCDURES;  Surgeon: David Murguia DPM;  Location: [facility] OR;  Service: Podiatry;  Laterality: Right;   • APPENDECTOMY     • ARTERIOGRAM N/A 5/14/2020    Procedure: abdominal aortogram bilateral lower extremity runoff possible angioplasty stent thrombolysis atherectomy;  Surgeon: Ruben Gustafson MD;  Location: [facility] ANGIO INVASIVE LOCATION;  Service: Interventional Radiology;  Laterality: N/A;   • ARTERIOVENOUS FISTULA Right 02/2019   • CAROTID ENDARTERECTOMY Right          Family History   Problem Relation Age of Onset   • Diabetes Mother    • Diabetes Father    • Diabetes Sister    • Cancer Brother    • Diabetes Brother        No Known Allergies    Social History     Socioeconomic History   • Marital status:      Spouse name: Not on file   • Number of children: Not on file   • Years of education: Not on file   • Highest education level: Not on file   Tobacco Use   • Smoking status: Current Every Day Smoker     Packs/day: 1.00     Years: 55.00     Pack years: 55.00   • Smokeless tobacco: Former User   Substance and Sexual Activity   • Alcohol use: Yes     Frequency:  "Never     Comment: used to drink   • Drug use: No   • Sexual activity: Defer         Current Outpatient Medications   Medication Sig Dispense Refill   • aspirin 81 MG EC tablet Take 1 tablet by mouth Daily. 150 tablet 2   • atorvastatin (LIPITOR) 80 MG tablet Take 1 tablet by mouth Every Night. 30 tablet 0   • calcitriol (ROCALTROL) 0.5 MCG capsule Take 1 capsule by mouth Daily. 30 capsule 0   • calcium acetate (PHOS BINDER,) 667 MG capsule capsule Take 1,334 mg by mouth 3 (Three) Times a Day.     • carvedilol (COREG) 12.5 MG tablet Take 12.5 mg by mouth 2 (Two) Times a Day With Meals.     • clopidogrel (PLAVIX) 75 MG tablet Take 1 tablet by mouth Daily. 30 tablet 11   • HYDROcodone-acetaminophen (NORCO) 7.5-325 MG per tablet Take 1 tablet by mouth Every 8 (Eight) Hours As Needed for Moderate Pain .     • insulin detemir (LEVEMIR) 100 UNIT/ML injection Inject 20 Units under the skin into the appropriate area as directed Every Night. 10 mL 0   • insulin lispro (humaLOG) 100 UNIT/ML injection Inject 10 Units under the skin into the appropriate area as directed 3 (Three) Times a Day Before Meals. Sliding scale     • losartan (COZAAR) 25 MG tablet Take 25 mg by mouth Daily.     • nitroglycerin (NITROSTAT) 0.4 MG SL tablet Place 1 tablet under the tongue Every 5 (Five) Minutes As Needed for Chest Pain. Take no more than 3 doses in 15 minutes. 100 tablet 12   • omeprazole (priLOSEC) 20 MG capsule Take 20 mg by mouth Daily.     • torsemide (DEMADEX) 20 MG tablet Take 20 mg by mouth 2 (Two) Times a Day.       No current facility-administered medications for this visit.        Review of Systems   Constitutional: Negative.    HENT: Negative.    Eyes: Positive for visual disturbance.   Respiratory: Negative.    Cardiovascular: Negative.    Gastrointestinal: Negative.    Psychiatric/Behavioral: Negative.          OBJECTIVE    Pulse 77   Ht 177.8 cm (70\")   Wt 73.5 kg (162 lb)   SpO2 97%   BMI 23.24 kg/m²       Physical Exam "   Constitutional: He is oriented to person, place, and time. He appears well-developed and well-nourished. No distress.   HENT:   Head: Normocephalic and atraumatic.   Nose: Nose normal.   Pulmonary/Chest: Effort normal. No respiratory distress.   Musculoskeletal: Normal range of motion. He exhibits no edema.   Neurological: He is alert and oriented to person, place, and time.   Psychiatric: He has a normal mood and affect. His behavior is normal. Thought content normal.   Vitals reviewed.      Lower Extremity: Incision site healed. Slight HPK tissue noted.  No edema. CFT is immediate to distal digits.  Negative Homans.  Sensation intact light touch.    Procedures        ASSESSMENT AND PLAN    Darwin was seen today for follow-up.    Diagnoses and all orders for this visit:    Gangrene of toe of right foot (CMS/HCC)    Type 2 diabetes mellitus with peripheral neuropathy (CMS/HCC)      - Incision site healed.  Encouraged patient to moisturize daily. Rx for custom orthotics with right great toe filler. Dorinda 6 months.           This document has been electronically signed by David Murguia DPM on August 11, 2020 09:19     8/11/2020  09:19

## 2020-08-17 ENCOUNTER — TRANSCRIBE ORDERS (OUTPATIENT)
Dept: PODIATRY | Facility: CLINIC | Age: 65
End: 2020-08-17

## 2020-08-17 DIAGNOSIS — Z89.419 HISTORY OF AMPUTATION OF HALLUX (HCC): ICD-10-CM

## 2020-08-17 DIAGNOSIS — E11.42 TYPE 2 DIABETES MELLITUS WITH PERIPHERAL NEUROPATHY (HCC): Primary | ICD-10-CM

## 2020-10-21 ENCOUNTER — OFFICE VISIT (OUTPATIENT)
Dept: CARDIAC SURGERY | Facility: CLINIC | Age: 65
End: 2020-10-21

## 2020-10-21 VITALS
SYSTOLIC BLOOD PRESSURE: 154 MMHG | WEIGHT: 172 LBS | BODY MASS INDEX: 24.62 KG/M2 | HEIGHT: 70 IN | DIASTOLIC BLOOD PRESSURE: 76 MMHG | OXYGEN SATURATION: 99 % | HEART RATE: 74 BPM

## 2020-10-21 DIAGNOSIS — I10 BENIGN ESSENTIAL HYPERTENSION: ICD-10-CM

## 2020-10-21 DIAGNOSIS — Z99.2 END-STAGE RENAL DISEASE ON HEMODIALYSIS (HCC): ICD-10-CM

## 2020-10-21 DIAGNOSIS — I73.9 PVD (PERIPHERAL VASCULAR DISEASE) (HCC): Primary | ICD-10-CM

## 2020-10-21 DIAGNOSIS — E78.2 MIXED HYPERLIPIDEMIA: ICD-10-CM

## 2020-10-21 DIAGNOSIS — F17.200 TOBACCO DEPENDENCE: ICD-10-CM

## 2020-10-21 DIAGNOSIS — N18.6 END-STAGE RENAL DISEASE ON HEMODIALYSIS (HCC): ICD-10-CM

## 2020-10-21 PROCEDURE — 99214 OFFICE O/P EST MOD 30 MIN: CPT | Performed by: NURSE PRACTITIONER

## 2020-10-21 NOTE — PATIENT INSTRUCTIONS
Normal Arterial Flow right Lower Extremity remained stable   Moderate reduction left lower extremity-stable  Medical Management: ASA,PLAVIX,STATIN   If signs and symptoms of ischemia should occur including but not limited to pale/blue discoloration of limb, increasing pain with ambulation or at rest, or a non-healing wound. Patient is to notify [facility] center for immediate evaluation.   Return 6 mos    Smoking cessation advised.  Tobacco increases risk of expanding AAA, MI, CVA, PAD, carcinoma.

## 2020-10-21 NOTE — PROGRESS NOTES
CVTS Office Progress Note       Subjective   Patient ID: Darwin Morrell is a 65 y.o. male is being seen for follow up.  Chief Complaint:    Chief Complaint   Patient presents with   • Peripheral Vascular Disease       The following portions of the patient's history were reviewed and updated as appropriate: allergies, current medications, past family history, past medical history, past social history, past surgical history and problem list.  Recent images independently reviewed.  Available laboratory values reviewed.    PCP:  Morgan Tovar MD   Nephrology: [facility] ([facility]) MWF     65 y.o. male with HTN(stable, increased risk stroke, rupture), Hyperlipidemia(stable, increased risk cardiovascular events), Diabetes Mellitus(stable, increased risk cardiovascular events), Smoker(uncontrolled, increased risk cardiovascular events) and Chronic Kidney Disease stage 5(stable, on dialysis)  Diabetic Ulcer RIGHT great toe. Callus x 6 weeks, now with dry gangrene. Has been soaking (epsom salts) at home and applying antibiotic cream.  smokes 1 PPD.  No TIA stroke amaurosis.  No MI claudication. No other associated signs, symptoms or modifying factors. Urgent vascular consultation requested.    5/5/2020:  TOMMY: RIGHT 0.5 Biphasic (CFV-DP) LEFT 0.78 Tri/Biphasic (DP/PT)  5/2020 CTA Aorta runoff:  RIGHT external iliac artery 80%, SFA moderate diffuse disease, 3v runoff.  LEFT CFA 70%, SFA 90% prox 70% distal. 2v runoff.  5/14/2020: PTA/Stent RIGHT EIA  5/21/2020 TOMMY: RIGHT 1.0 Tri/Biphasic(PT/DP)  LEFT 0.7 Tri/Biphasic (PT)  7/1/2020:  TOMMY: RIGHT 0.95 Triphasic LEFT 0.71 Tri/Biphasic (Pop/DP)  10/21/2020: TOMMY: RIGHT 1.0 Triphasic LEFT 0.81 Tri/Biphasic (Pop/DP). REIA stent patent-triphasic      2/2018 Echocardiogram:  EF 60%, LA 40mm, LV 48mm, RVSP 39mmHg.  Tr MR TR.      Past Medical History:   Diagnosis Date   • Callus    • Diabetes mellitus (CMS/HCC)    • ESRD (end stage renal disease) (CMS/HCC)    • GERD  (gastroesophageal reflux disease)    • Hyperlipidemia    • Hypertension    • Smoker      Past Surgical History:   Procedure Laterality Date   • AMPUTATION     • AMPUTATION DIGIT Right 5/28/2020    Procedure: HULLUX AMPUTATION AND ALL OTHER INDICATED PROCDURES;  Surgeon: David Murguia DPM;  Location: [facility] OR;  Service: Podiatry;  Laterality: Right;   • APPENDECTOMY     • ARTERIOGRAM N/A 5/14/2020    Procedure: abdominal aortogram bilateral lower extremity runoff possible angioplasty stent thrombolysis atherectomy;  Surgeon: Ruben Gustafson MD;  Location: [facility] ANGIO INVASIVE LOCATION;  Service: Interventional Radiology;  Laterality: N/A;   • ARTERIOVENOUS FISTULA Right 02/2019   • CAROTID ENDARTERECTOMY Right      Family History   Problem Relation Age of Onset   • Diabetes Mother    • Diabetes Father    • Diabetes Sister    • Cancer Brother    • Diabetes Brother      Social History     Tobacco Use   • Smoking status: Current Every Day Smoker     Packs/day: 1.00     Years: 55.00     Pack years: 55.00   • Smokeless tobacco: Former User   Substance Use Topics   • Alcohol use: Yes     Frequency: Never     Comment: used to drink   • Drug use: No       ALLERGIES:   Patient has no known allergies.    MEDICATIONS:      Current Outpatient Medications:   •  aspirin 81 MG EC tablet, Take 1 tablet by mouth Daily., Disp: 150 tablet, Rfl: 2  •  atorvastatin (LIPITOR) 80 MG tablet, Take 1 tablet by mouth Every Night., Disp: 30 tablet, Rfl: 0  •  calcitriol (ROCALTROL) 0.5 MCG capsule, Take 1 capsule by mouth Daily., Disp: 30 capsule, Rfl: 0  •  calcium acetate (PHOS BINDER,) 667 MG capsule capsule, Take 1,334 mg by mouth 3 (Three) Times a Day., Disp: , Rfl:   •  carvedilol (COREG) 12.5 MG tablet, Take 12.5 mg by mouth 2 (Two) Times a Day With Meals., Disp: , Rfl:   •  clopidogrel (PLAVIX) 75 MG tablet, Take 1 tablet by mouth Daily., Disp: 30 tablet, Rfl: 11  •  HYDROcodone-acetaminophen (NORCO) 7.5-325 MG per tablet,  "Take 1 tablet by mouth Every 8 (Eight) Hours As Needed for Moderate Pain ., Disp: , Rfl:   •  insulin detemir (LEVEMIR) 100 UNIT/ML injection, Inject 20 Units under the skin into the appropriate area as directed Every Night., Disp: 10 mL, Rfl: 0  •  insulin lispro (humaLOG) 100 UNIT/ML injection, Inject 10 Units under the skin into the appropriate area as directed 3 (Three) Times a Day Before Meals. Sliding scale, Disp: , Rfl:   •  losartan (COZAAR) 25 MG tablet, Take 25 mg by mouth Daily., Disp: , Rfl:   •  nitroglycerin (NITROSTAT) 0.4 MG SL tablet, Place 1 tablet under the tongue Every 5 (Five) Minutes As Needed for Chest Pain. Take no more than 3 doses in 15 minutes., Disp: 100 tablet, Rfl: 12  •  omeprazole (priLOSEC) 20 MG capsule, Take 20 mg by mouth Daily., Disp: , Rfl:   •  torsemide (DEMADEX) 20 MG tablet, Take 20 mg by mouth 2 (Two) Times a Day., Disp: , Rfl:     Review of Systems   Constitution: Negative for fever.   Eyes: Negative for visual disturbance.   Cardiovascular: Negative for claudication, cyanosis and leg swelling.   Respiratory: Negative for shortness of breath.    Skin: Positive for color change, dry skin, nail changes and poor wound healing.   Musculoskeletal: Positive for arthritis. Negative for muscle weakness.   Gastrointestinal: Negative for dysphagia.   Neurological: Negative for focal weakness, light-headedness, loss of balance, numbness, paresthesias and weakness.   Psychiatric/Behavioral: Negative for altered mental status.   All other systems reviewed and are negative.       Objective   Vitals:    10/21/20 0919   BP: 154/76   BP Location: Left arm   Patient Position: Sitting   Cuff Size: Adult   Pulse: 74   SpO2: 99%   Weight: 78 kg (172 lb)   Height: 177.8 cm (70\")     Body mass index is 24.68 kg/m².  Physical Exam   Constitutional: He is oriented to person, place, and time.   HENT:   Head: Atraumatic.   Neck: Neck supple.   Cardiovascular: Normal rate and normal heart sounds. "   Pulses:       Dorsalis pedis pulses are 1+ on the right side and 1+ on the left side.        Posterior tibial pulses are 1+ on the right side and 1+ on the left side.   (R) AV Fistula: +thrill/bruit   Pulmonary/Chest: Effort normal and breath sounds normal.   Abdominal: Soft. Bowel sounds are normal.   Musculoskeletal: Normal range of motion.   Neurological: He is alert and oriented to person, place, and time.   Skin: Skin is warm.   (R) great toe amputation: healed   Psychiatric: Thought content normal.   Vitals reviewed.        Assessment & Plan     Independent Review of Radiographic Studies:  Detailed discussion regarding risks, benefits, and treatment plan. Images independently reviewed. Patient understands, agrees, and wishes to proceed with plan.     1. PVD (peripheral vascular disease) (CMS/[facility])  Normal Arterial Flow right Lower Extremity remained stable   Moderate reduction left lower extremity-stable  Medical Management: ASA,PLAVIX,STATIN   If signs and symptoms of ischemia should occur including but not limited to pale/blue discoloration of limb, increasing pain with ambulation or at rest, or a non-healing wound. Patient is to notify [facility] Vascular center for immediate evaluation.   Return 6 mos  - Doppler Ankle Brachial Index Single Level CAR; Future    2. Mixed hyperlipidemia  Lipid-lowering therapy has been proven beneficial in patients with cardio-vascular disease. Current guidelines recommend statin treatment for all patients with PAD,CAD and carotid stenosis. Statins are beneficial in preventing cardiovascular events, increasing functional capacity and lower the risk of adverse limb loss in PAD. Statins decrease the progression of plaque formation and may improve peripheral vessel lining, and aid in reversing atherosclerosis.       3. Benign essential hypertension  Partially controlled.     4. End-stage renal disease on hemodialysis (CMS/[facility])  Continue dialysis as scheduled. If problems should  arise including difficulty with access, high pressure alarms, or inability to complete dialysis please notify [facility] immediately for evaluation. Functioning [place]ist[place]. Follows in [place].     5. Tobacco dependence  Smoking cessation assistance options offered including behavioral counseling (Smoking Cessation Classes), Nicotine replacement therapy (patches or gum), pharmacologic therapy (Chantix, Wellbutrin). Understands tobacco increases risk of expanding AAA, MI, CVA, PAD, carcinoma. Discussion and question answer period 5-7 minutes. Darwin Morrell  reports that he has been smoking. He has a 55.00 pack-year smoking history. He has quit using smokeless tobacco.. I have educated him on the risk of diseases from using tobacco products such as cancer, COPD and heart disease.     I advised him to quit and he is not willing to quit.    I spent 5 minutes counseling the patient.                 This document has been electronically signed by KIMBERLY Villalpando on October 27, 2020 09:04 CDT

## 2021-01-27 ENCOUNTER — OFFICE VISIT (OUTPATIENT)
Dept: PODIATRY | Facility: CLINIC | Age: 66
End: 2021-01-27

## 2021-01-27 VITALS — HEART RATE: 69 BPM | BODY MASS INDEX: 24.62 KG/M2 | HEIGHT: 70 IN | WEIGHT: 172 LBS | OXYGEN SATURATION: 98 %

## 2021-01-27 DIAGNOSIS — L97.522 ULCER OF LEFT FOOT WITH FAT LAYER EXPOSED (HCC): ICD-10-CM

## 2021-01-27 DIAGNOSIS — E11.42 TYPE 2 DIABETES MELLITUS WITH PERIPHERAL NEUROPATHY (HCC): Primary | ICD-10-CM

## 2021-01-27 PROCEDURE — 99213 OFFICE O/P EST LOW 20 MIN: CPT | Performed by: PODIATRIST

## 2021-01-27 PROCEDURE — 11042 DBRDMT SUBQ TIS 1ST 20SQCM/<: CPT | Performed by: PODIATRIST

## 2021-01-27 NOTE — PROGRESS NOTES
Darwin Morrell  1955  65 y.o. male   M. SHELIA Guy - 12/2020 (pt unsure of exact date)  BS -  167 per pt    Patient presents today with a complaint of a sore area on the side of his left foot and a pre-ulcerative callus on his left hallux.     01/27/2021     Chief Complaint   Patient presents with   • Left Foot - Wound Check, Pain, Callouses       History of Present Illness    Darwin Morrell is a 65 y.o.male who presents to clinic with chief complaint of new ulcers on his left foot.  He first noticed them around January 10 of this year.  He denies any injuries.  They are not painful.  He has been cleaning them and dressing them with antibiotic ointment.  He has no other complaints.    Past Medical History:   Diagnosis Date   • Callus    • Diabetes mellitus (CMS/HCC)    • ESRD (end stage renal disease) (CMS/HCC)    • Foot ulcer (CMS/HCC)    • GERD (gastroesophageal reflux disease)    • Hyperlipidemia    • Hypertension    • Smoker          Past Surgical History:   Procedure Laterality Date   • AMPUTATION     • AMPUTATION DIGIT Right 5/28/2020    Procedure: HULLUX AMPUTATION AND ALL OTHER INDICATED PROCDURES;  Surgeon: David Murguia DPM;  Location: [facility] OR;  Service: Podiatry;  Laterality: Right;   • APPENDECTOMY     • ARTERIOGRAM N/A 5/14/2020    Procedure: abdominal aortogram bilateral lower extremity runoff possible angioplasty stent thrombolysis atherectomy;  Surgeon: Ruben Gustafson MD;  Location: [facility] ANGIO INVASIVE LOCATION;  Service: Interventional Radiology;  Laterality: N/A;   • ARTERIOVENOUS FISTULA Right 02/2019   • CAROTID ENDARTERECTOMY Right          Family History   Problem Relation Age of Onset   • Diabetes Mother    • Diabetes Father    • Diabetes Sister    • Cancer Brother    • Diabetes Brother        No Known Allergies    Social History     Socioeconomic History   • Marital status:      Spouse name: Not on file   • Number of children: Not on file   • Years of education:  Not on file   • Highest education level: Not on file   Tobacco Use   • Smoking status: Current Every Day Smoker     Packs/day: 1.00     Years: 55.00     Pack years: 55.00   • Smokeless tobacco: Former User   Substance and Sexual Activity   • Alcohol use: Yes     Frequency: Never     Comment: used to drink   • Drug use: No   • Sexual activity: Defer         Current Outpatient Medications   Medication Sig Dispense Refill   • aspirin 81 MG EC tablet Take 1 tablet by mouth Daily. 150 tablet 2   • atorvastatin (LIPITOR) 80 MG tablet Take 1 tablet by mouth Every Night. 30 tablet 0   • calcitriol (ROCALTROL) 0.5 MCG capsule Take 1 capsule by mouth Daily. 30 capsule 0   • calcium acetate (PHOS BINDER,) 667 MG capsule capsule Take 1,334 mg by mouth 3 (Three) Times a Day.     • carvedilol (COREG) 12.5 MG tablet Take 12.5 mg by mouth 2 (Two) Times a Day With Meals.     • clopidogrel (PLAVIX) 75 MG tablet Take 1 tablet by mouth Daily. 30 tablet 11   • HYDROcodone-acetaminophen (NORCO) 7.5-325 MG per tablet Take 1 tablet by mouth Every 8 (Eight) Hours As Needed for Moderate Pain .     • insulin detemir (LEVEMIR) 100 UNIT/ML injection Inject 20 Units under the skin into the appropriate area as directed Every Night. 10 mL 0   • insulin lispro (humaLOG) 100 UNIT/ML injection Inject 10 Units under the skin into the appropriate area as directed 3 (Three) Times a Day Before Meals. Sliding scale     • losartan (COZAAR) 25 MG tablet Take 25 mg by mouth Daily.     • nitroglycerin (NITROSTAT) 0.4 MG SL tablet Place 1 tablet under the tongue Every 5 (Five) Minutes As Needed for Chest Pain. Take no more than 3 doses in 15 minutes. 100 tablet 12   • omeprazole (priLOSEC) 20 MG capsule Take 20 mg by mouth Daily.     • torsemide (DEMADEX) 20 MG tablet Take 20 mg by mouth 2 (Two) Times a Day.       No current facility-administered medications for this visit.        Review of Systems   Constitutional: Negative.    HENT: Negative.    Eyes:  "Positive for visual disturbance.   Respiratory: Negative.    Cardiovascular: Negative.    Gastrointestinal: Negative.    Musculoskeletal:        Left foot pain   Skin: Positive for wound.   Neurological: Positive for numbness. Negative for dizziness.   Psychiatric/Behavioral: Negative.          OBJECTIVE    Pulse 69   Ht 177.8 cm (70\")   Wt 78 kg (172 lb)   SpO2 98%   BMI 24.68 kg/m²       Physical Exam   Constitutional: He is oriented to person, place, and time. He appears well-developed. No distress.   HENT:   Head: Normocephalic and atraumatic.   Nose: Nose normal.   Pulmonary/Chest: Effort normal. No respiratory distress.   Abdominal: Normal appearance.   Musculoskeletal: Normal range of motion.   Neurological: He is alert and oriented to person, place, and time.   Skin: Skin is warm.   Psychiatric: His behavior is normal. Thought content normal.   Vitals reviewed.      Left Lower Extremity:     Cardiovascular:    DP/PT pulses non-palpable    Skin temp is warm to warm from proximal tibia to distal digits    No erythema or edema noted   Musculoskeletal:  Muscle strength is 5/5 for all muscle groups tested   No pain on palpation   Dermatological:   Webspaces 1-4 are clean, dry and intact.   No subcutaneous nodules or masses noted    Open wound noted to medial left hallux.  Preagreement wound measures 0.5 x 0.4 x 0.2 cm.  Large hyperkeratotic border with undermining in all directions.  Serous drainage.  Hyperkeratotic lesion noted to plantar left fifth metatarsal head.  Hemosiderin deposition noted.  No foul odor purulent drainage.  Neurological:   Protective sensation diminished  Sensation intact to light touch      Procedures        ASSESSMENT AND PLAN    Diagnoses and all orders for this visit:    1. Type 2 diabetes mellitus with peripheral neuropathy (CMS/HCC) (Primary)    2. Ulcer of left foot with fat layer exposed (CMS/HCC)      - Comprehensive foot exam performed.   -New wounds  - Performed sharp " excisional debridement of all devitalized tissue to the level of subcutaneous tissue with a #15 blade to healthy bleeding borders.  Post debridement wound measurements are as follows:   -Left hallux-2.0 x 1.5 x 0.2 cm   -Plantar left fifth metatarsal head-2.3 x 2.2 x 0.3 cm.  -Ulcers were dressed.  Patient instructed on daily dressing changes.  Dispensed offloaded surgical shoe.  - All questions were answered to the patients satisfaction.  -Referral to wound care center for wound treatment          This document has been electronically signed by David Murguia DPM on January 28, 2021 17:17 CST     1/28/2021  17:17 CST

## 2021-02-01 ENCOUNTER — OFFICE VISIT (OUTPATIENT)
Dept: WOUND CARE | Facility: HOSPITAL | Age: 66
End: 2021-02-01

## 2021-02-01 PROCEDURE — G0463 HOSPITAL OUTPT CLINIC VISIT: HCPCS

## 2021-02-11 ENCOUNTER — APPOINTMENT (OUTPATIENT)
Dept: WOUND CARE | Facility: HOSPITAL | Age: 66
End: 2021-02-11

## 2021-02-12 ENCOUNTER — APPOINTMENT (OUTPATIENT)
Dept: WOUND CARE | Facility: HOSPITAL | Age: 66
End: 2021-02-12

## 2021-02-16 ENCOUNTER — APPOINTMENT (OUTPATIENT)
Dept: WOUND CARE | Facility: HOSPITAL | Age: 66
End: 2021-02-16

## 2021-02-23 ENCOUNTER — OFFICE VISIT (OUTPATIENT)
Dept: WOUND CARE | Facility: HOSPITAL | Age: 66
End: 2021-02-23

## 2021-03-02 ENCOUNTER — OFFICE VISIT (OUTPATIENT)
Dept: WOUND CARE | Facility: HOSPITAL | Age: 66
End: 2021-03-02

## 2021-03-12 ENCOUNTER — OFFICE VISIT (OUTPATIENT)
Dept: WOUND CARE | Facility: HOSPITAL | Age: 66
End: 2021-03-12

## 2021-03-12 PROCEDURE — 11042 DBRDMT SUBQ TIS 1ST 20SQCM/<: CPT | Performed by: PODIATRIST

## 2021-03-18 ENCOUNTER — OFFICE VISIT (OUTPATIENT)
Dept: WOUND CARE | Facility: HOSPITAL | Age: 66
End: 2021-03-18

## 2021-03-23 ENCOUNTER — OFFICE VISIT (OUTPATIENT)
Dept: WOUND CARE | Facility: HOSPITAL | Age: 66
End: 2021-03-23

## 2021-03-23 PROCEDURE — G0463 HOSPITAL OUTPT CLINIC VISIT: HCPCS

## 2021-03-25 ENCOUNTER — APPOINTMENT (OUTPATIENT)
Dept: WOUND CARE | Facility: HOSPITAL | Age: 66
End: 2021-03-25

## 2021-03-30 ENCOUNTER — OFFICE VISIT (OUTPATIENT)
Dept: WOUND CARE | Facility: HOSPITAL | Age: 66
End: 2021-03-30

## 2021-03-30 PROCEDURE — 11055 PARING/CUTG B9 HYPRKER LES 1: CPT

## 2021-04-22 ENCOUNTER — OFFICE VISIT (OUTPATIENT)
Dept: CARDIAC SURGERY | Facility: CLINIC | Age: 66
End: 2021-04-22

## 2021-04-22 VITALS
HEIGHT: 70 IN | WEIGHT: 175 LBS | OXYGEN SATURATION: 99 % | SYSTOLIC BLOOD PRESSURE: 150 MMHG | HEART RATE: 69 BPM | DIASTOLIC BLOOD PRESSURE: 80 MMHG | BODY MASS INDEX: 25.05 KG/M2

## 2021-04-22 DIAGNOSIS — Z99.2 END-STAGE RENAL DISEASE ON HEMODIALYSIS (HCC): ICD-10-CM

## 2021-04-22 DIAGNOSIS — I73.9 PVD (PERIPHERAL VASCULAR DISEASE) (HCC): Primary | ICD-10-CM

## 2021-04-22 DIAGNOSIS — N18.6 END-STAGE RENAL DISEASE ON HEMODIALYSIS (HCC): ICD-10-CM

## 2021-04-22 DIAGNOSIS — L97.509 TYPE 2 DIABETES MELLITUS WITH FOOT ULCER AND GANGRENE (HCC): ICD-10-CM

## 2021-04-22 DIAGNOSIS — E78.2 MIXED HYPERLIPIDEMIA: ICD-10-CM

## 2021-04-22 DIAGNOSIS — E11.621 TYPE 2 DIABETES MELLITUS WITH FOOT ULCER AND GANGRENE (HCC): ICD-10-CM

## 2021-04-22 DIAGNOSIS — I10 BENIGN ESSENTIAL HYPERTENSION: ICD-10-CM

## 2021-04-22 DIAGNOSIS — E11.52 TYPE 2 DIABETES MELLITUS WITH FOOT ULCER AND GANGRENE (HCC): ICD-10-CM

## 2021-04-22 DIAGNOSIS — F17.200 TOBACCO DEPENDENCE: ICD-10-CM

## 2021-04-22 PROCEDURE — 99214 OFFICE O/P EST MOD 30 MIN: CPT | Performed by: NURSE PRACTITIONER

## 2021-04-22 NOTE — PROGRESS NOTES
CVTS Office Progress Note       Subjective   Patient ID: Darwin Morrell is a 66 y.o. male is being seen for follow up.  Chief Complaint:    Chief Complaint   Patient presents with   • Peripheral Vascular Disease       The following portions of the patient's history were reviewed and updated as appropriate: allergies, current medications, past family history, past medical history, past social history, past surgical history and problem list.  Recent images independently reviewed.  Available laboratory values reviewed.    PCP:  Morgan Tovar MD   Nephrology:  [facility] ([facility]) MWF     66 y.o. male with HTN(stable, increased risk stroke, rupture), Hyperlipidemia(stable, increased risk cardiovascular events), Diabetes Mellitus(stable, increased risk cardiovascular events), Smoker(uncontrolled, increased risk cardiovascular events) and Chronic Kidney Disease stage 5(stable, on dialysis)  Diabetic Ulcer RIGHT great toe. Callus x 6 weeks, now with dry gangrene. Has been soaking (epsom salts) at home and applying antibiotic cream.  smokes 1 PPD.  No TIA stroke amaurosis.  No MI claudication. No other associated signs, symptoms or modifying factors. Urgent vascular consultation requested.    5/5/2020:  TOMMY: RIGHT 0.5 Biphasic (CFV-DP) LEFT 0.78 Tri/Biphasic (DP/PT)  5/2020 CTA Aorta runoff:  RIGHT external iliac artery 80%, SFA moderate diffuse disease, 3v runoff.  LEFT CFA 70%, SFA 90% prox 70% distal. 2v runoff.  5/14/2020: PTA/Stent RIGHT EIA  5/21/2020 TOMMY: RIGHT 1.0 Tri/Biphasic(PT/DP)  LEFT 0.7 Tri/Biphasic (PT)  7/1/2020:  TOMMY: RIGHT 0.95 Triphasic LEFT 0.71 Tri/Biphasic (Pop/DP)  10/21/2020: TOMMY: RIGHT 1.0 Triphasic LEFT 0.81 Tri/Biphasic (Pop/DP). REIA stent patent-triphasic  4/22/2021: TOMMY: RIGHT 1.1 Triphasic LEFT 0.69 Tri/Biphasic (Pop/DP)     2/2018 Echocardiogram:  EF 60%, LA 40mm, LV 48mm, RVSP 39mmHg.  Tr MR TR.      Past Medical History:   Diagnosis Date   • Callus    • Diabetes mellitus  (CMS/[facility])    • ESRD (end stage renal disease) (CMS/[facility])    • Foot ulcer (CMS/[facility])    • GERD (gastroesophageal reflux disease)    • Hyperlipidemia    • Hypertension    • Smoker      Past Surgical History:   Procedure Laterality Date   • AMPUTATION     • AMPUTATION DIGIT Right 5/28/2020    Procedure: HULLUX AMPUTATION AND ALL OTHER INDICATED PROCDURES;  Surgeon: David Murguia DPM;  Location: [facility] OR;  Service: Podiatry;  Laterality: Right;   • APPENDECTOMY     • ARTERIOGRAM N/A 5/14/2020    Procedure: abdominal aortogram bilateral lower extremity runoff possible angioplasty stent thrombolysis atherectomy;  Surgeon: Ruben Gustafson MD;  Location: [facility] ANGIO INVASIVE LOCATION;  Service: Interventional Radiology;  Laterality: N/A;   • ARTERIOVENOUS FISTULA Right 02/2019   • CAROTID ENDARTERECTOMY Right      Family History   Problem Relation Age of Onset   • Diabetes Mother    • Diabetes Father    • Diabetes Sister    • Cancer Brother    • Diabetes Brother      Social History     Tobacco Use   • Smoking status: Current Every Day Smoker     Packs/day: 1.00     Years: 55.00     Pack years: 55.00   • Smokeless tobacco: Former User   Substance Use Topics   • Alcohol use: Yes     Comment: used to drink   • Drug use: No       ALLERGIES:   Patient has no known allergies.    MEDICATIONS:      Current Outpatient Medications:   •  aspirin 81 MG EC tablet, Take 1 tablet by mouth Daily., Disp: 150 tablet, Rfl: 2  •  atorvastatin (LIPITOR) 80 MG tablet, Take 1 tablet by mouth Every Night., Disp: 30 tablet, Rfl: 0  •  calcitriol (ROCALTROL) 0.5 MCG capsule, Take 1 capsule by mouth Daily., Disp: 30 capsule, Rfl: 0  •  calcium acetate (PHOS BINDER,) 667 MG capsule capsule, Take 1,334 mg by mouth 3 (Three) Times a Day., Disp: , Rfl:   •  carvedilol (COREG) 12.5 MG tablet, Take 12.5 mg by mouth 2 (Two) Times a Day With Meals., Disp: , Rfl:   •  clopidogrel (PLAVIX) 75 MG tablet, Take 1 tablet by mouth Daily., Disp: 30  "tablet, Rfl: 11  •  HYDROcodone-acetaminophen (NORCO) 7.5-325 MG per tablet, Take 1 tablet by mouth Every 8 (Eight) Hours As Needed for Moderate Pain ., Disp: , Rfl:   •  insulin detemir (LEVEMIR) 100 UNIT/ML injection, Inject 20 Units under the skin into the appropriate area as directed Every Night., Disp: 10 mL, Rfl: 0  •  insulin lispro (humaLOG) 100 UNIT/ML injection, Inject 10 Units under the skin into the appropriate area as directed 3 (Three) Times a Day Before Meals. Sliding scale, Disp: , Rfl:   •  losartan (COZAAR) 25 MG tablet, Take 25 mg by mouth Daily., Disp: , Rfl:   •  nitroglycerin (NITROSTAT) 0.4 MG SL tablet, Place 1 tablet under the tongue Every 5 (Five) Minutes As Needed for Chest Pain. Take no more than 3 doses in 15 minutes., Disp: 100 tablet, Rfl: 12  •  omeprazole (priLOSEC) 20 MG capsule, Take 20 mg by mouth Daily., Disp: , Rfl:   •  torsemide (DEMADEX) 20 MG tablet, Take 20 mg by mouth 2 (Two) Times a Day., Disp: , Rfl:     Review of Systems   Constitutional: Negative for fever.   Eyes: Negative for visual disturbance.   Cardiovascular: Positive for chest pain. Negative for claudication, cyanosis and leg swelling.   Respiratory: Negative for shortness of breath.    Skin: Positive for color change, dry skin, nail changes and poor wound healing.   Musculoskeletal: Positive for arthritis. Negative for muscle weakness.   Gastrointestinal: Negative for dysphagia.   Neurological: Negative for focal weakness, light-headedness, loss of balance, numbness, paresthesias and weakness.   Psychiatric/Behavioral: Negative for altered mental status.   All other systems reviewed and are negative.       Objective   Vitals:    04/22/21 1023   BP: 150/80   BP Location: Left arm   Patient Position: Sitting   Cuff Size: Adult   Pulse: 69   SpO2: 99%   Weight: 79.4 kg (175 lb)   Height: 177.8 cm (70\")     Body mass index is 25.11 kg/m².  Physical Exam   Constitutional: He is oriented to person, place, and time.   "   HENT:   Head: Atraumatic.   Cardiovascular: Normal rate and normal heart sounds.   Pulses:       Dorsalis pedis pulses are 1+ on the right side and 1+ on the left side.        Posterior tibial pulses are 1+ on the right side and 1+ on the left side.   (R) AV Fistula: +thrill/bruit   Pulmonary/Chest: Effort normal and breath sounds normal.   Abdominal: Soft. Bowel sounds are normal.   Musculoskeletal: Normal range of motion.   Neurological: He is alert and oriented to person, place, and time. Gait normal.   Skin: Skin is warm and dry.   Psychiatric: Thought content normal.   Vitals reviewed.        Assessment & Plan     Independent Review of Radiographic Studies:  Detailed discussion regarding risks, benefits, and treatment plan. Images independently reviewed. Patient understands, agrees, and wishes to proceed with plan.     1. PVD (peripheral vascular disease) (CMS/[facility])  moderate reduction Arterial Flow left Lower Extremity remained stable  Medical Management: ASA,PLAVIX,STATIN   If signs and symptoms of ischemia should occur including but not limited to pale/blue discoloration of limb, increasing pain with ambulation or at rest, or a non-healing wound. Patient is to notify Heart and Vascular center for immediate evaluation.   Return 6 mos    - Doppler Ankle Brachial Index Single Level CAR; Future    2. Mixed hyperlipidemia  Lipid-lowering therapy has been proven beneficial in patients with cardio-vascular disease. Current guidelines recommend statin treatment for all patients with PAD,CAD and carotid stenosis. Statins are beneficial in preventing cardiovascular events, increasing functional capacity and lower the risk of adverse limb loss in PAD. Statins decrease the progression of plaque formation and may improve peripheral vessel lining, and aid in reversing atherosclerosis.      3. End-stage renal disease on hemodialysis (CMS/[facility])  Dialysis MWF    4. Benign essential hypertension  Controlled.     5. Type 2  diabetes mellitus with foot ulcer and gangrene (CMS/HCC)  Increased risk for worsening PAD, CAD, stent closure, and progressive carotid disease with uncontrolled DM.   No results found for: HGBA1C Reports uncontrolled.   Medical management: Diet. Oral Medications. Insulin. Other injectables.   Followed by PCP     6. Tobacco dependence  Smoking cessation assistance options offered including behavioral counseling (Smoking Cessation Classes), Nicotine replacement therapy (patches or gum), pharmacologic therapy (Chantix, Wellbutrin). Understands tobacco increases risk of expanding AAA, MI, CVA, PAD, carcinoma. Discussion and question answer period 5-7 minutes. Darwin Morrell  reports that he has been smoking. He has a 55.00 pack-year smoking history. He has quit using smokeless tobacco.. I have educated him on the risk of diseases from using tobacco products such as cancer, COPD and heart disease.     I advised him to quit and he is not willing to quit.    I spent 5 minutes counseling the patient.           Patient's Body mass index is 25.11 kg/m². BMI is within normal parameters. No follow-up required..     Time spent 30 minutes in face to face evaluation, reviewing of medical history, tests, and procedures. Independent interpretation of vascular studies, ordering additional tests, documentation, and coordination of care.   Counseling and education with the patient and family regarding treatment options, plan of care, and hoped for outcomes. All questions answered.           This document has been electronically signed by KIMBERLY Villalpando on April 22, 2021 11:46 CDT

## 2021-04-22 NOTE — PATIENT INSTRUCTIONS
moderate reduction Arterial Flow left Lower Extremity remained stable  Medical Management: ASA,PLAVIX,STATIN   If signs and symptoms of ischemia should occur including but not limited to pale/blue discoloration of limb, increasing pain with ambulation or at rest, or a non-healing wound. Patient is to notify Heart and Vascular center for immediate evaluation.   Return 6 mos    Smoking cessation advised.  Tobacco increases risk of expanding AAA, MI, CVA, PAD, carcinoma.

## 2021-04-28 RX ORDER — CLOPIDOGREL BISULFATE 75 MG/1
TABLET ORAL
Qty: 30 TABLET | Refills: 0 | Status: SHIPPED | OUTPATIENT
Start: 2021-04-28 | End: 2021-07-27 | Stop reason: SDDI

## 2021-04-29 ENCOUNTER — HOSPITAL ENCOUNTER (OUTPATIENT)
Dept: PHYSICAL THERAPY | Facility: HOSPITAL | Age: 66
Setting detail: THERAPIES SERIES
Discharge: HOME | End: 2021-04-29

## 2021-04-29 DIAGNOSIS — E11.52 TYPE 2 DIABETES MELLITUS WITH FOOT ULCER AND GANGRENE (HCC): ICD-10-CM

## 2021-04-29 DIAGNOSIS — L97.509 TYPE 2 DIABETES MELLITUS WITH FOOT ULCER AND GANGRENE (HCC): ICD-10-CM

## 2021-04-29 DIAGNOSIS — E11.621 TYPE 2 DIABETES MELLITUS WITH FOOT ULCER AND GANGRENE (HCC): ICD-10-CM

## 2021-04-29 PROCEDURE — 97161 PT EVAL LOW COMPLEX 20 MIN: CPT | Performed by: PHYSICAL THERAPIST

## 2021-04-29 NOTE — THERAPY EVALUATION
Outpatient Physical Therapy Ortho Initial Evaluation  [facility]     Patient Name: Darwin Morrell  : 1955  MRN: [id number]  Today's Date: 2021      Visit Date: 2021    Patient Active Problem List   Diagnosis   • DKA (diabetic ketoacidoses) (CMS/[facility])   • NSTEMI (non-ST elevated myocardial infarction) (CMS/[facility])   • CKD (chronic kidney disease) stage 5, GFR less than 15 ml/min (CMS/[facility])   • Gangrene of toe of right foot (CMS/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)   • Tobacco dependence   • Type 2 diabetes mellitus with foot ulcer and gangrene (CMS/[facility])   • Benign essential hypertension   • End-stage renal disease on hemodialysis (CMS/[facility])   • Gastroesophageal reflux disease   • Hyperlipidemia   • PVD (peripheral vascular disease) (CMS/[facility])        Past Medical History:   Diagnosis Date   • Callus    • Diabetes mellitus (CMS/[facility])    • ESRD (end stage renal disease) (CMS/[facility])    • Foot ulcer (CMS/[facility])    • GERD (gastroesophageal reflux disease)    • Hyperlipidemia    • Hypertension    • Smoker         Past Surgical History:   Procedure Laterality Date   • AMPUTATION     • AMPUTATION DIGIT Right 2020    Procedure: HULLUX AMPUTATION AND ALL OTHER INDICATED PROCDURES;  Surgeon: David Murguia DPM;  Location: [facility] OR;  Service: Podiatry;  Laterality: Right;   • APPENDECTOMY     • ARTERIOGRAM N/A 2020    Procedure: abdominal aortogram bilateral lower extremity runoff possible angioplasty stent thrombolysis atherectomy;  Surgeon: Ruben Gustafson MD;  Location: [facility] ANGIO INVASIVE LOCATION;  Service: Interventional Radiology;  Laterality: N/A;   • ARTERIOVENOUS FISTULA Right 2019   • CAROTID ENDARTERECTOMY Right        Visit Dx:     ICD-10-CM ICD-9-CM   1. Type 2 diabetes mellitus with foot ulcer and gangrene (CMS/[facility])  E11.621 250.70    L97.509 250.80    E11.52 785.4     707.15         Patient History     Row  Name 04/29/21 1100             History    Chief Complaint  -- diabetes with foot ulcers  -BB      Brief Description of Current Complaint  Present today with reports of no history of orthotics. Hx of right great toe amputation and wishing to amputate left vs try to continue to work on heeling wound. Has diabetes with hx of ulcers.   -BB      Previous treatment for THIS PROBLEM  -- no previous orthotics  -BB      Patient/Caregiver Goals  Heal wound  -BB      Patient's Rating of General Health  Poor  -BB      Hand Dominance  left-handed  -BB      Patient seeing anyone else for problem(s)?  DPM  -BB      How has patient tried to help current problem?  wound care  -BB         Pain     Pain Location  Foot  -BB      Pain at Present  3  -BB      Pain at Best  3  -BB      Pain at Worst  4  -BB      Pain Frequency  Constant/continuous  -BB      Pain Description  Burning  -BB      What Performance Factors Make the Current Problem(s) WORSE?  none  -BB      Pain Comments  burning pain  -BB         Fall Risk Assessment    Any falls in the past year:  Yes  -BB      Number of falls reported in the last 12 months  several  -BB      Factors that contributed to the fall:  Other (comment)  -BB      Other factors that contributed to the fall:  hypoglycemia  -BB        User Key  (r) = Recorded By, (t) = Taken By, (c) = Cosigned By    Initials Name Provider Type    BB Jenny Robles PT DPT Physical Therapist          PT Ortho     Row Name 04/29/21 1100       Subjective Pain    Able to rate subjective pain?  yes  -BB    Pre-Treatment Pain Level  3  -BB    Post-Treatment Pain Level  4  -BB       Posture/Observations    Posture/Observations Comments  Hypermobility of First met R. L callousing of 1st and 5th met head. Bandage covering L great toe.  R great toe amputation. Kalyan. pes cavus  -BB       General ROM    GENERAL ROM COMMENTS  Ankles WNL  -BB       MMT (Manual Muscle Testing)    General MMT Comments  WNL for gait  -BB       Sensation     Additional Comments  -- Intact to crude touch  -BB      User Key  (r) = Recorded By, (t) = Taken By, (c) = Cosigned By    Initials Name Provider Type    Jenny Sanchez PT DPT Physical Therapist                      Therapy Education  Education Details: 20 min interval for increasing of wear schedule. Diligent skin inspections  Given: HEP  Program: New  How Provided: Verbal  Provided to: Patient  Level of Understanding: Verbalized     PT OP Goals     Row Name 04/29/21 1100          PT Short Term Goals    STG Date to Achieve  05/20/21  -BB     STG 1  Ind. in orthotic wear schedule and skin inspection  -BB        Time Calculation    PT Goal Re-Cert Due Date  05/20/21  -BB       User Key  (r) = Recorded By, (t) = Taken By, (c) = Cosigned By    Initials Name Provider Type    Jenny Sanchez PT DPT Physical Therapist          PT Assessment/Plan     Row Name 04/29/21 1100          PT Assessment    Functional Limitations  Impaired gait  -BB     Impairments  Gait;Pain;Poor body mechanics;Integumentary integrity  -BB     Assessment Comments  PAtient presents today with history of great toe amputation and currently working on improving a great toe wound with an additional callus of 5th met head. Patient has rigid mobility of the forefoot with DM and could benefit from custom orthotics to assist in offloading and improve weight bearing. Tenderfoot soft to be fabricated for patient. Significant time spent explaining importance of skin inspection and very slow breakin period.   -BB     Rehab Potential  Good  -BB     Patient/caregiver participated in establishment of treatment plan and goals  Yes  -BB     Patient would benefit from skilled therapy intervention  Yes  -BB        PT Plan    Predicted Duration of Therapy Intervention (PT)  PRN  -BB     Planned CPT's?  PT EVAL LOW COMPLEXITY: 11092;PT ORTHOTIC MGMT/TRAIN EA 15 MIN: 81574;PT THER SUPP EA 15 MIN  -BB     PT Plan Comments  orthotics checkout and adjustment PRN   -BB       User Key  (r) = Recorded By, (t) = Taken By, (c) = Cosigned By    Initials Name Provider Type    Jenny Sanchez PT DPT Physical Therapist            OP Exercises     Row Name 04/29/21 1100             Subjective Comments    Subjective Comments  See eval  -BB         Subjective Pain    Able to rate subjective pain?  yes  -BB      Pre-Treatment Pain Level  3  -BB      Post-Treatment Pain Level  4  -BB         Exercise 1    Exercise Name 1  Custom orthotics  -BB        User Key  (r) = Recorded By, (t) = Taken By, (c) = Cosigned By    Initials Name Provider Type    Jenny Sanchez PT DPT Physical Therapist                                  Time Calculation:     Start Time: 1100  Stop Time: 1118  Time Calculation (min): 18 min  Time Calculation- PT  Start Time: 1100  Stop Time: 1118  Time Calculation (min): 18 min  PT Goal Re-Cert Due Date: 05/20/21     Therapy Charges for Today     Code Description Service Date Service Provider Modifiers Qty    [id number]  PT EVAL LOW COMPLEXITY 1 4/29/2021 Jenny Robles PT DPT GP 1    [id number]  PT-CUSTOM ORTHOTICS-LEVEL 2 4/29/2021 Jenny Robles PT DPT  1                    Jenny Robles PT DPT  4/29/2021

## 2021-05-13 ENCOUNTER — OFFICE VISIT (OUTPATIENT)
Dept: WOUND CARE | Facility: HOSPITAL | Age: 66
End: 2021-05-13

## 2021-05-20 ENCOUNTER — OFFICE VISIT (OUTPATIENT)
Dept: WOUND CARE | Facility: HOSPITAL | Age: 66
End: 2021-05-20

## 2021-05-20 PROCEDURE — 11055 PARING/CUTG B9 HYPRKER LES 1: CPT

## 2021-05-27 ENCOUNTER — OFFICE VISIT (OUTPATIENT)
Dept: WOUND CARE | Facility: HOSPITAL | Age: 66
End: 2021-05-27

## 2021-06-17 ENCOUNTER — OFFICE VISIT (OUTPATIENT)
Dept: WOUND CARE | Facility: HOSPITAL | Age: 66
End: 2021-06-17

## 2021-06-17 PROCEDURE — G0463 HOSPITAL OUTPT CLINIC VISIT: HCPCS

## 2021-06-28 NOTE — PATIENT INSTRUCTIONS
severe reduction Arterial Flow right Lower Extremity with toe gangrene   Proceed with CTA Runoff- Thursday 5/7/2020  Follow up Dr. Gustafson-Monday 5/11/2020  Medical Management: STATIN   Referral to Podiatry    Smoking cessation advised.  Tobacco increases risk of expanding AAA, MI, CVA, PAD, carcinoma.       
ACP

## 2021-06-29 ENCOUNTER — TELEPHONE (OUTPATIENT)
Dept: CARDIAC SURGERY | Facility: CLINIC | Age: 66
End: 2021-06-29

## 2021-06-29 ENCOUNTER — OFFICE VISIT (OUTPATIENT)
Dept: CARDIAC SURGERY | Facility: CLINIC | Age: 66
End: 2021-06-29

## 2021-06-29 VITALS
DIASTOLIC BLOOD PRESSURE: 80 MMHG | HEART RATE: 100 BPM | WEIGHT: 175 LBS | SYSTOLIC BLOOD PRESSURE: 136 MMHG | TEMPERATURE: 97.8 F | BODY MASS INDEX: 25.05 KG/M2 | HEIGHT: 70 IN | OXYGEN SATURATION: 100 %

## 2021-06-29 DIAGNOSIS — F17.200 TOBACCO DEPENDENCE: ICD-10-CM

## 2021-06-29 DIAGNOSIS — E78.2 MIXED HYPERLIPIDEMIA: ICD-10-CM

## 2021-06-29 DIAGNOSIS — Z99.2 END-STAGE RENAL DISEASE ON HEMODIALYSIS (HCC): ICD-10-CM

## 2021-06-29 DIAGNOSIS — I70.244 ATHSCL NATIVE ART OF LEFT LEG W ULCER OF HEEL AND MIDFOOT (HCC): ICD-10-CM

## 2021-06-29 DIAGNOSIS — N18.6 END-STAGE RENAL DISEASE ON HEMODIALYSIS (HCC): ICD-10-CM

## 2021-06-29 DIAGNOSIS — I73.9 PVD (PERIPHERAL VASCULAR DISEASE) (HCC): Primary | ICD-10-CM

## 2021-06-29 PROCEDURE — 99215 OFFICE O/P EST HI 40 MIN: CPT | Performed by: NURSE PRACTITIONER

## 2021-06-29 NOTE — TELEPHONE ENCOUNTER
Spoke to patient to notify of procedure scheduled for Thursday 7/8/21 arrival at 7am, NPO after midnight, ok to take meds except diabetic meds, bring a  due to sedation. Pt verbalized understanding.

## 2021-06-29 NOTE — PATIENT INSTRUCTIONS
The results of your vascular studies of your right leg shows mild disease with good  circulation, in the left leg shows moderate disease with fair circulation.      If signs and symptoms of ischemia should occur including but not limited to pale/blue discoloration of limb, increasing pain with ambulation or at rest, or a non-healing wound. Patient is to notify [facility] Vascular center for immediate evaluation.

## 2021-06-30 RX ORDER — SODIUM CHLORIDE 9 MG/ML
100 INJECTION, SOLUTION INTRAVENOUS CONTINUOUS
Status: CANCELLED | OUTPATIENT
Start: 2021-07-08

## 2021-06-30 NOTE — PROGRESS NOTES
CVTS Office Progress Note       Subjective   Patient ID: Darwin Morrell is a 66 y.o. male is being seen for follow up.  Chief Complaint:    Chief Complaint   Patient presents with   • Peripheral Vascular Disease       The following portions of the patient's history were reviewed and updated as appropriate: allergies, current medications, past family history, past medical history, past social history, past surgical history and problem list.  Recent images independently reviewed.  Available laboratory values reviewed.    PCP:  Morgan Tovar MD   Nephrology:  [facility] ([facility]) MWF     66 y.o. male with HTN(stable, increased risk stroke, rupture), Hyperlipidemia(stable, increased risk cardiovascular events), Diabetes Mellitus(stable, increased risk cardiovascular events), Smoker(uncontrolled, increased risk cardiovascular events) and Chronic Kidney Disease stage 5(stable, on dialysis)  Diabetic Ulcer RIGHT great toe. Callus x 6 weeks, now with dry gangrene. Has been soaking (epsom salts) at home and applying antibiotic cream.  smokes 1 PPD.  No TIA stroke amaurosis.  No MI claudication. No other associated signs, symptoms or modifying factors.  Follow up requested by WC for vascular evaluation secondary to non-healing ulceration of Left foot.     5/5/2020:  TOMMY: RIGHT 0.5 Biphasic (CFV-DP) LEFT 0.78 Tri/Biphasic (DP/PT)  5/2020 CTA Aorta runoff:  RIGHT external iliac artery 80%, SFA moderate diffuse disease, 3v runoff.  LEFT CFA 70%, SFA 90% prox 70% distal. 2v runoff.  5/14/2020: PTA/Stent RIGHT EIA  5/21/2020 TOMMY: RIGHT 1.0 Tri/Biphasic(PT/DP)  LEFT 0.7 Tri/Biphasic (PT)  7/1/2020:  TOMMY: RIGHT 0.95 Triphasic LEFT 0.71 Tri/Biphasic (Pop/DP)  10/21/2020: TOMMY: RIGHT 1.0 Triphasic LEFT 0.81 Tri/Biphasic (Pop/DP). REIA stent patent-triphasic  4/22/2021: TOMMY: RIGHT 1.1 Triphasic LEFT 0.69 Tri/Biphasic (Pop/DP)   6/2021 TOMMY: Right 1.05 triphasic.  Left 0.68 biphasic    2/2018 Echocardiogram:  EF 60%, LA 40mm, LV  48mm, RVSP 39mmHg.  Tr MR TR.      Past Medical History:   Diagnosis Date   • Callus    • Diabetes mellitus (CMS/HCC)    • ESRD (end stage renal disease) (CMS/HCC)    • Foot ulcer (CMS/HCC)    • GERD (gastroesophageal reflux disease)    • Hyperlipidemia    • Hypertension    • Smoker      Past Surgical History:   Procedure Laterality Date   • AMPUTATION     • AMPUTATION DIGIT Right 5/28/2020    Procedure: HULLUX AMPUTATION AND ALL OTHER INDICATED PROCDURES;  Surgeon: David Murguia DPM;  Location: [facility] OR;  Service: Podiatry;  Laterality: Right;   • APPENDECTOMY     • ARTERIOGRAM N/A 5/14/2020    Procedure: abdominal aortogram bilateral lower extremity runoff possible angioplasty stent thrombolysis atherectomy;  Surgeon: Ruben Gustafson MD;  Location: [facility] ANGIO INVASIVE LOCATION;  Service: Interventional Radiology;  Laterality: N/A;   • ARTERIOVENOUS FISTULA Right 02/2019   • CAROTID ENDARTERECTOMY Right      Family History   Problem Relation Age of Onset   • Diabetes Mother    • Diabetes Father    • Diabetes Sister    • Cancer Brother    • Diabetes Brother      Social History     Tobacco Use   • Smoking status: Current Every Day Smoker     Packs/day: 1.00     Years: 55.00     Pack years: 55.00   • Smokeless tobacco: Former User   Substance Use Topics   • Alcohol use: Yes     Comment: used to drink   • Drug use: No       ALLERGIES:   Patient has no known allergies.    MEDICATIONS:      Current Outpatient Medications:   •  atorvastatin (LIPITOR) 80 MG tablet, Take 1 tablet by mouth Every Night., Disp: 30 tablet, Rfl: 0  •  calcitriol (ROCALTROL) 0.5 MCG capsule, Take 1 capsule by mouth Daily., Disp: 30 capsule, Rfl: 0  •  calcium acetate (PHOS BINDER,) 667 MG capsule capsule, Take 1,334 mg by mouth 3 (Three) Times a Day., Disp: , Rfl:   •  carvedilol (COREG) 12.5 MG tablet, Take 12.5 mg by mouth 2 (Two) Times a Day With Meals., Disp: , Rfl:   •  clopidogrel (PLAVIX) 75 MG tablet, TAKE 1 TABLET DAILY.,  "Disp: 30 tablet, Rfl: 0  •  HYDROcodone-acetaminophen (NORCO) 7.5-325 MG per tablet, Take 1 tablet by mouth Every 8 (Eight) Hours As Needed for Moderate Pain ., Disp: , Rfl:   •  insulin detemir (LEVEMIR) 100 UNIT/ML injection, Inject 20 Units under the skin into the appropriate area as directed Every Night., Disp: 10 mL, Rfl: 0  •  insulin lispro (humaLOG) 100 UNIT/ML injection, Inject 10 Units under the skin into the appropriate area as directed 3 (Three) Times a Day Before Meals. Sliding scale, Disp: , Rfl:   •  losartan (COZAAR) 25 MG tablet, Take 25 mg by mouth Daily., Disp: , Rfl:   •  nitroglycerin (NITROSTAT) 0.4 MG SL tablet, Place 1 tablet under the tongue Every 5 (Five) Minutes As Needed for Chest Pain. Take no more than 3 doses in 15 minutes., Disp: 100 tablet, Rfl: 12  •  omeprazole (priLOSEC) 20 MG capsule, Take 20 mg by mouth Daily., Disp: , Rfl:   •  torsemide (DEMADEX) 20 MG tablet, Take 20 mg by mouth 2 (Two) Times a Day., Disp: , Rfl:     Review of Systems   Constitutional: Negative for fever.   Eyes: Negative for visual disturbance.   Cardiovascular: Positive for chest pain. Negative for claudication, cyanosis and leg swelling.   Respiratory: Negative for shortness of breath.    Skin: Positive for color change, dry skin, nail changes and poor wound healing.   Musculoskeletal: Positive for arthritis. Negative for muscle weakness.   Gastrointestinal: Negative for dysphagia.   Neurological: Negative for focal weakness, light-headedness, loss of balance, numbness, paresthesias and weakness.   Psychiatric/Behavioral: Negative for altered mental status.   All other systems reviewed and are negative.       Objective   Vitals:    06/29/21 1402   BP: 136/80   BP Location: Left arm   Patient Position: Sitting   Cuff Size: Adult   Pulse: 100   Temp: 97.8 °F (36.6 °C)   TempSrc: Temporal   SpO2: 100%   Weight: 79.4 kg (175 lb)   Height: 177.8 cm (70\")     Body mass index is 25.11 kg/m².  Physical Exam "   Constitutional: He is oriented to person, place, and time.   HENT:   Head: Atraumatic.   Cardiovascular: Normal rate and normal heart sounds.   Pulses:       Dorsalis pedis pulses are 1+ on the right side and 1+ on the left side.        Posterior tibial pulses are 1+ on the right side and 1+ on the left side.   (R) AV Fistula: +thrill/bruit   Pulmonary/Chest: Effort normal and breath sounds normal.   Abdominal: Soft. Bowel sounds are normal.   Musculoskeletal: Normal range of motion.   Neurological: He is alert and oriented to person, place, and time. Gait normal.   Skin: Skin is warm and dry.   Ulceration ball of left foot, lateral pinky toe, dry no drainage   Psychiatric: Thought content normal.   Vitals reviewed.        Assessment & Plan     Independent Review of Radiographic Studies:  6/2021 TOMMY: Right 1.05 triphasic.  Left 0.68 biphasic, Left external iliac vessel with moderate stenosis on last years arteriogram      1. PVD (peripheral vascular disease) (CMS/[facility])  Detailed discussion with Darwin Morrell regarding situation, options and plans.  Severe ischemic tissue loss.  Noninvasive studies indicate severe peripheral vascular disease.  Requires additional urgent evaluation with arteriography to evaluate options for improving blood flow to feet, healing wounds and avoiding limb loss.    Risks, including but not limited to:  pain, infection, bleeding, renal failure (dialysis), nerve or blood vessel injury, need for emergent open operation to restore blood flow.  Benefits: relief of symptoms, reduction in risk of limb loss, improved wound healing.  Options:  Medical therapy, alternative imaging discussed.  Understands and wishes to proceed with plan.    Abdominal Aortogram, bilateral lower extremity runoff, possible balloon angioplasty, thrombolysis, atherectomy or stent.  Local/IV sedation.  SDS.  7/8/2021    - Case request    2. 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  Mixed hyperlipidemia  Lipid-lowering therapy has been proven beneficial in patients with cardio-vascular disease. Current guidelines recommend statin treatment for all patients with PAD,CAD and carotid stenosis. Statins are beneficial in preventing cardiovascular events, increasing functional capacity and lower the risk of adverse limb loss in PAD. Statins decrease the progression of plaque formation and may improve peripheral vessel lining, and aid in reversing atherosclerosis.    4. Tobacco dependence  Smoking cessation assistance options offered including behavioral counseling (Smoking Cessation Classes), Nicotine replacement therapy (patches or gum), pharmacologic therapy (Chantix, Wellbutrin). Understands tobacco increases risk of expanding AAA, MI, CVA, PAD, carcinoma. Discussion and question answer period 5-7 minutes.    5. End-stage renal disease on hemodialysis (CMS/[facility])  Continue dialysis scheduled via MWF, follows with [facility] for AV access          This document has been electronically signed by KIMBERLY Yuan on June 30, 2021 14:35 CDT

## 2021-07-01 ENCOUNTER — OFFICE VISIT (OUTPATIENT)
Dept: WOUND CARE | Facility: HOSPITAL | Age: 66
End: 2021-07-01

## 2021-07-01 ENCOUNTER — APPOINTMENT (OUTPATIENT)
Dept: CT IMAGING | Facility: HOSPITAL | Age: 66
End: 2021-07-01

## 2021-07-06 ENCOUNTER — OFFICE VISIT (OUTPATIENT)
Dept: WOUND CARE | Facility: HOSPITAL | Age: 66
End: 2021-07-06

## 2021-07-08 ENCOUNTER — APPOINTMENT (OUTPATIENT)
Dept: ULTRASOUND IMAGING | Facility: HOSPITAL | Age: 66
End: 2021-07-08

## 2021-07-08 ENCOUNTER — HOSPITAL ENCOUNTER (OUTPATIENT)
Facility: HOSPITAL | Age: 66
Setting detail: HOSPITAL OUTPATIENT SURGERY
Discharge: HOME | End: 2021-07-08
Attending: THORACIC SURGERY (CARDIOTHORACIC VASCULAR SURGERY) | Admitting: THORACIC SURGERY (CARDIOTHORACIC VASCULAR SURGERY)

## 2021-07-08 VITALS
HEIGHT: 70 IN | BODY MASS INDEX: 24.84 KG/M2 | WEIGHT: 173.5 LBS | RESPIRATION RATE: 16 BRPM | DIASTOLIC BLOOD PRESSURE: 81 MMHG | SYSTOLIC BLOOD PRESSURE: 173 MMHG | HEART RATE: 79 BPM | TEMPERATURE: 97 F | OXYGEN SATURATION: 100 %

## 2021-07-08 DIAGNOSIS — I73.9 PVD (PERIPHERAL VASCULAR DISEASE) (HCC): ICD-10-CM

## 2021-07-08 DIAGNOSIS — I70.244 ATHSCL NATIVE ART OF LEFT LEG W ULCER OF HEEL AND MIDFOOT (HCC): ICD-10-CM

## 2021-07-08 LAB — GLUCOSE BLDC GLUCOMTR-MCNC: 320 MG/DL (ref 70–130)

## 2021-07-08 PROCEDURE — C1769 GUIDE WIRE: HCPCS | Performed by: THORACIC SURGERY (CARDIOTHORACIC VASCULAR SURGERY)

## 2021-07-08 PROCEDURE — C2623 CATH, TRANSLUMIN, DRUG-COAT: HCPCS | Performed by: THORACIC SURGERY (CARDIOTHORACIC VASCULAR SURGERY)

## 2021-07-08 PROCEDURE — C1894 INTRO/SHEATH, NON-LASER: HCPCS | Performed by: THORACIC SURGERY (CARDIOTHORACIC VASCULAR SURGERY)

## 2021-07-08 PROCEDURE — C1887 CATHETER, GUIDING: HCPCS | Performed by: THORACIC SURGERY (CARDIOTHORACIC VASCULAR SURGERY)

## 2021-07-08 PROCEDURE — 82962 GLUCOSE BLOOD TEST: CPT

## 2021-07-08 PROCEDURE — 25010000002 FENTANYL CITRATE (PF) 50 MCG/ML SOLUTION: Performed by: THORACIC SURGERY (CARDIOTHORACIC VASCULAR SURGERY)

## 2021-07-08 PROCEDURE — 25010000002 MIDAZOLAM PER 1 MG: Performed by: THORACIC SURGERY (CARDIOTHORACIC VASCULAR SURGERY)

## 2021-07-08 PROCEDURE — 25010000002 HEPARIN (PORCINE) PER 1000 UNITS: Performed by: THORACIC SURGERY (CARDIOTHORACIC VASCULAR SURGERY)

## 2021-07-08 PROCEDURE — 76937 US GUIDE VASCULAR ACCESS: CPT

## 2021-07-08 PROCEDURE — C1760 CLOSURE DEV, VASC: HCPCS | Performed by: THORACIC SURGERY (CARDIOTHORACIC VASCULAR SURGERY)

## 2021-07-08 PROCEDURE — 25010000002 IOPAMIDOL 61 % SOLUTION: Performed by: THORACIC SURGERY (CARDIOTHORACIC VASCULAR SURGERY)

## 2021-07-08 PROCEDURE — 37224 PR REVSC OPN/PRG FEM/POP W/ANGIOPLASTY UNI: CPT | Performed by: THORACIC SURGERY (CARDIOTHORACIC VASCULAR SURGERY)

## 2021-07-08 PROCEDURE — 75716 ARTERY X-RAYS ARMS/LEGS: CPT | Performed by: THORACIC SURGERY (CARDIOTHORACIC VASCULAR SURGERY)

## 2021-07-08 PROCEDURE — 75625 CONTRAST EXAM ABDOMINL AORTA: CPT | Performed by: THORACIC SURGERY (CARDIOTHORACIC VASCULAR SURGERY)

## 2021-07-08 RX ORDER — HEPARIN SODIUM 1000 [USP'U]/ML
INJECTION, SOLUTION INTRAVENOUS; SUBCUTANEOUS
Status: DISCONTINUED
Start: 2021-07-08 | End: 2021-07-08 | Stop reason: HOSPADM

## 2021-07-08 RX ORDER — FENTANYL CITRATE 50 UG/ML
INJECTION, SOLUTION INTRAMUSCULAR; INTRAVENOUS AS NEEDED
Status: DISCONTINUED | OUTPATIENT
Start: 2021-07-08 | End: 2021-07-08 | Stop reason: HOSPADM

## 2021-07-08 RX ORDER — SODIUM CHLORIDE 9 MG/ML
100 INJECTION, SOLUTION INTRAVENOUS CONTINUOUS
Status: DISCONTINUED | OUTPATIENT
Start: 2021-07-08 | End: 2021-07-08 | Stop reason: HOSPADM

## 2021-07-08 RX ORDER — MIDAZOLAM HYDROCHLORIDE 1 MG/ML
INJECTION INTRAMUSCULAR; INTRAVENOUS AS NEEDED
Status: DISCONTINUED | OUTPATIENT
Start: 2021-07-08 | End: 2021-07-08 | Stop reason: HOSPADM

## 2021-07-08 RX ORDER — MIDAZOLAM HYDROCHLORIDE 1 MG/ML
INJECTION INTRAMUSCULAR; INTRAVENOUS
Status: DISCONTINUED
Start: 2021-07-08 | End: 2021-07-08 | Stop reason: HOSPADM

## 2021-07-08 RX ORDER — LIDOCAINE HYDROCHLORIDE 20 MG/ML
INJECTION, SOLUTION INFILTRATION; PERINEURAL
Status: COMPLETED
Start: 2021-07-08 | End: 2021-07-08

## 2021-07-08 RX ORDER — FENTANYL CITRATE 50 UG/ML
INJECTION, SOLUTION INTRAMUSCULAR; INTRAVENOUS
Status: DISCONTINUED
Start: 2021-07-08 | End: 2021-07-08 | Stop reason: HOSPADM

## 2021-07-08 RX ORDER — LIDOCAINE AND PRILOCAINE 25; 25 MG/G; MG/G
1 CREAM TOPICAL ONCE
COMMUNITY

## 2021-07-08 RX ORDER — EPLERENONE 25 MG/1
50 TABLET, FILM COATED ORAL DAILY
COMMUNITY

## 2021-07-08 RX ORDER — NIFEDIPINE 30 MG/1
30 TABLET, EXTENDED RELEASE ORAL DAILY
COMMUNITY

## 2021-07-08 RX ORDER — ACETAMINOPHEN 325 MG/1
650 TABLET ORAL EVERY 4 HOURS PRN
Status: DISCONTINUED | OUTPATIENT
Start: 2021-07-08 | End: 2021-07-08 | Stop reason: HOSPADM

## 2021-07-08 RX ORDER — ERGOCALCIFEROL 1.25 MG/1
50000 CAPSULE ORAL WEEKLY
COMMUNITY

## 2021-07-08 RX ORDER — HEPARIN SODIUM 1000 [USP'U]/ML
INJECTION, SOLUTION INTRAVENOUS; SUBCUTANEOUS AS NEEDED
Status: DISCONTINUED | OUTPATIENT
Start: 2021-07-08 | End: 2021-07-08 | Stop reason: HOSPADM

## 2021-07-08 RX ADMIN — LIDOCAINE HYDROCHLORIDE: 20 INJECTION, SOLUTION INFILTRATION; PERINEURAL at 10:52

## 2021-07-08 RX ADMIN — SODIUM CHLORIDE 100 ML/HR: 9 INJECTION, SOLUTION INTRAVENOUS at 07:35

## 2021-07-13 ENCOUNTER — OFFICE VISIT (OUTPATIENT)
Dept: WOUND CARE | Facility: HOSPITAL | Age: 66
End: 2021-07-13

## 2021-07-27 ENCOUNTER — OFFICE VISIT (OUTPATIENT)
Dept: CARDIAC SURGERY | Facility: CLINIC | Age: 66
End: 2021-07-27

## 2021-07-27 VITALS
TEMPERATURE: 98.7 F | WEIGHT: 179.8 LBS | OXYGEN SATURATION: 98 % | HEIGHT: 70 IN | SYSTOLIC BLOOD PRESSURE: 128 MMHG | DIASTOLIC BLOOD PRESSURE: 72 MMHG | BODY MASS INDEX: 25.74 KG/M2 | HEART RATE: 64 BPM

## 2021-07-27 DIAGNOSIS — L97.509 TYPE 2 DIABETES MELLITUS WITH FOOT ULCER AND GANGRENE (HCC): ICD-10-CM

## 2021-07-27 DIAGNOSIS — E11.52 TYPE 2 DIABETES MELLITUS WITH FOOT ULCER AND GANGRENE (HCC): ICD-10-CM

## 2021-07-27 DIAGNOSIS — I70.244 ATHSCL NATIVE ART OF LEFT LEG W ULCER OF HEEL AND MIDFOOT (HCC): ICD-10-CM

## 2021-07-27 DIAGNOSIS — E78.2 MIXED HYPERLIPIDEMIA: Primary | ICD-10-CM

## 2021-07-27 DIAGNOSIS — I10 BENIGN ESSENTIAL HYPERTENSION: ICD-10-CM

## 2021-07-27 DIAGNOSIS — N18.6 END-STAGE RENAL DISEASE ON HEMODIALYSIS (HCC): ICD-10-CM

## 2021-07-27 DIAGNOSIS — Z99.2 END-STAGE RENAL DISEASE ON HEMODIALYSIS (HCC): ICD-10-CM

## 2021-07-27 DIAGNOSIS — F17.200 TOBACCO DEPENDENCE: ICD-10-CM

## 2021-07-27 DIAGNOSIS — E11.621 TYPE 2 DIABETES MELLITUS WITH FOOT ULCER AND GANGRENE (HCC): ICD-10-CM

## 2021-07-27 PROCEDURE — 99214 OFFICE O/P EST MOD 30 MIN: CPT | Performed by: NURSE PRACTITIONER

## 2021-07-27 NOTE — PATIENT INSTRUCTIONS
moderate reduction Arterial Flow left Lower Extremity ulceration  Medical Management: ASA,STATIN   Start PVD dosing Xarelto  Samples given  If signs and symptoms of ischemia should occur including but not limited to pale/blue discoloration of limb, increasing pain with ambulation or at rest, or a non-healing wound. Patient is to notify Heart and Vascular center for immediate evaluation.   Return 6 weeks - TOMMY    Smoking cessation advised.  Tobacco increases risk of expanding AAA, MI, CVA, PAD, carcinoma.

## 2021-07-27 NOTE — PROGRESS NOTES
CVTS Office Progress Note       Subjective   Patient ID: Darwin Morrell is a 66 y.o. male is being seen for follow up.  Chief Complaint:    Chief Complaint   Patient presents with   • Peripheral Vascular Disease       The following portions of the patient's history were reviewed and updated as appropriate: allergies, current medications, past family history, past medical history, past social history, past surgical history and problem list.  Recent images independently reviewed.  Available laboratory values reviewed.    PCP:  Morgan Tovar MD   Nephrology:  [facility] ([facility]) MWF     66 y.o. male with HTN(stable, increased risk stroke, rupture), Hyperlipidemia(stable, increased risk cardiovascular events), Diabetes Mellitus(stable, increased risk cardiovascular events), Smoker(uncontrolled, increased risk cardiovascular events) and Chronic Kidney Disease stage 5(stable, on dialysis)  Diabetic Ulcer RIGHT great toe. Callus x 6 weeks, now with dry gangrene. Has been soaking (epsom salts) at home and applying antibiotic cream.  smokes 1 PPD.  No TIA stroke amaurosis.  No MI claudication. No other associated signs, symptoms or modifying factors.      5/5/2020:  TOMMY: RIGHT 0.5 Biphasic (CFV-DP) LEFT 0.78 Tri/Biphasic (DP/PT)  5/2020 CTA Aorta runoff:  RIGHT external iliac artery 80%, SFA moderate diffuse disease, 3v runoff.  LEFT CFA 70%, SFA 90% prox 70% distal. 2v runoff.  5/14/2020: PTA/Stent RIGHT EIA  5/21/2020 TOMMY: RIGHT 1.0 Tri/Biphasic(PT/DP)  LEFT 0.7 Tri/Biphasic (PT)  7/1/2020:  TOMMY: RIGHT 0.95 Triphasic LEFT 0.71 Tri/Biphasic (Pop/DP)  10/21/2020: TOMMY: RIGHT 1.0 Triphasic LEFT 0.81 Tri/Biphasic (Pop/DP). REIA stent patent-triphasic  4/22/2021: TOMMY: RIGHT 1.1 Triphasic LEFT 0.69 Tri/Biphasic (Pop/DP)   6/2021 TOMMY: Right 1.05 triphasic.  Left 0.68 biphasic  7/8/21:PTA LEFT SFA/CFA  7/27/21: TOMMY: RIGHT 1.0 Triphasic LEFT 0.73 Biphasic     2/2018 Echocardiogram:  EF 60%, LA 40mm, LV 48mm, RVSP 39mmHg.   Tr MR TR.      Past Medical History:   Diagnosis Date   • Callus    • Diabetes mellitus (CMS/HCC)    • ESRD (end stage renal disease) (CMS/HCC)    • Foot ulcer (CMS/HCC)    • GERD (gastroesophageal reflux disease)    • Hyperlipidemia    • Hypertension    • Smoker      Past Surgical History:   Procedure Laterality Date   • AMPUTATION     • AMPUTATION DIGIT Right 5/28/2020    Procedure: HULLUX AMPUTATION AND ALL OTHER INDICATED PROCDURES;  Surgeon: David Murguia DPM;  Location: [facility] OR;  Service: Podiatry;  Laterality: Right;   • APPENDECTOMY     • ARTERIOGRAM N/A 5/14/2020    Procedure: abdominal aortogram bilateral lower extremity runoff possible angioplasty stent thrombolysis atherectomy;  Surgeon: Ruben Gustafson MD;  Location: [facility] ANGIO INVASIVE LOCATION;  Service: Interventional Radiology;  Laterality: N/A;   • ARTERIOVENOUS FISTULA Right 02/2019   • CAROTID ENDARTERECTOMY Right    • INTERVENTIONAL RADIOLOGY PROCEDURE Left 7/8/2021    Procedure: Abdominal Aortagram with Runoff;  Surgeon: Ruben Gustafson MD;  Location: [facility] ANGIO INVASIVE LOCATION;  Service: Interventional Radiology;  Laterality: Left;     Family History   Problem Relation Age of Onset   • Diabetes Mother    • Diabetes Father    • Diabetes Sister    • Cancer Brother    • Diabetes Brother      Social History     Tobacco Use   • Smoking status: Current Every Day Smoker     Packs/day: 0.50     Years: 55.00     Pack years: 27.50   • Smokeless tobacco: Former User   Substance Use Topics   • Alcohol use: Yes     Comment: used to drink   • Drug use: No       ALLERGIES:   Patient has no known allergies.    MEDICATIONS:      Current Outpatient Medications:   •  atorvastatin (LIPITOR) 80 MG tablet, Take 1 tablet by mouth Every Night. (Patient taking differently: Take 40 mg by mouth Every Night.), Disp: 30 tablet, Rfl: 0  •  calcitriol (ROCALTROL) 0.5 MCG capsule, Take 1 capsule by mouth Daily., Disp: 30 capsule, Rfl: 0  •  calcium  acetate (PHOS BINDER,) 667 MG capsule capsule, Take 1,334 mg by mouth 3 (Three) Times a Day., Disp: , Rfl:   •  carvedilol (COREG) 12.5 MG tablet, Take 12.5 mg by mouth 2 (Two) Times a Day With Meals., Disp: , Rfl:   •  eplerenone (INSPRA) 25 MG tablet, Take 50 mg by mouth Daily., Disp: , Rfl:   •  HYDROcodone-acetaminophen (NORCO) 7.5-325 MG per tablet, Take 1 tablet by mouth Every 8 (Eight) Hours As Needed for Moderate Pain ., Disp: , Rfl:   •  insulin detemir (LEVEMIR) 100 UNIT/ML injection, Inject 20 Units under the skin into the appropriate area as directed Every Night., Disp: 10 mL, Rfl: 0  •  insulin lispro (humaLOG) 100 UNIT/ML injection, Inject 10 Units under the skin into the appropriate area as directed 3 (Three) Times a Day Before Meals. Sliding scale, Disp: , Rfl:   •  lidocaine-prilocaine (EMLA) 2.5-2.5 % cream, Apply 1 application topically to the appropriate area as directed 1 (One) Time. m-w-f, Disp: , Rfl:   •  losartan (COZAAR) 25 MG tablet, Take 100 mg by mouth Daily., Disp: , Rfl:   •  NIFEdipine XL (PROCARDIA XL) 30 MG 24 hr tablet, Take 30 mg by mouth Daily., Disp: , Rfl:   •  omeprazole (priLOSEC) 20 MG capsule, Take 20 mg by mouth Daily., Disp: , Rfl:   •  torsemide (DEMADEX) 20 MG tablet, Take 20 mg by mouth 3 (Three) Times a Day., Disp: , Rfl:   •  vitamin D (ERGOCALCIFEROL) 1.25 MG (71702 UT) capsule capsule, Take 50,000 Units by mouth 1 (One) Time Per Week., Disp: , Rfl:   •  Wound Dressings ([facility] Wound/Burn Dressing) gel, Apply  topically Daily., Disp: , Rfl:   •  nitroglycerin (NITROSTAT) 0.4 MG SL tablet, Place 1 tablet under the tongue Every 5 (Five) Minutes As Needed for Chest Pain. Take no more than 3 doses in 15 minutes., Disp: 100 tablet, Rfl: 12  •  Rivaroxaban (Xarelto) 2.5 MG tablet, Take 1 tablet by mouth 2 (Two) Times a Day., Disp: 60 tablet, Rfl:     Review of Systems   Constitutional: Negative for fever.   Eyes: Negative for visual disturbance.   Cardiovascular:  "Positive for chest pain. Negative for claudication, cyanosis and leg swelling.   Respiratory: Negative for shortness of breath.    Skin: Positive for color change, dry skin, nail changes and poor wound healing.   Musculoskeletal: Positive for arthritis. Negative for muscle weakness.   Gastrointestinal: Negative for dysphagia.   Neurological: Negative for focal weakness, light-headedness, loss of balance, numbness, paresthesias and weakness.   Psychiatric/Behavioral: Negative for altered mental status.   All other systems reviewed and are negative.       Objective   Vitals:    07/27/21 0826   BP: 128/72   BP Location: Left arm   Pulse: 64   Temp: 98.7 °F (37.1 °C)   TempSrc: Infrared   SpO2: 98%   Weight: 81.6 kg (179 lb 12.8 oz)   Height: 177.8 cm (70\")     Body mass index is 25.8 kg/m².  Physical Exam   Constitutional: He is oriented to person, place, and time.   HENT:   Head: Atraumatic.   Cardiovascular: Normal rate and normal heart sounds.   Pulses:       Dorsalis pedis pulses are 2+ on the right side and 1+ on the left side.        Posterior tibial pulses are 2+ on the right side and 1+ on the left side.   (R) AV Fistula: +thrill/bruit   Pulmonary/Chest: Effort normal and breath sounds normal.   Abdominal: Soft. Bowel sounds are normal.   Musculoskeletal: Normal range of motion.   Neurological: He is alert and oriented to person, place, and time. Gait normal.   Skin: Skin is warm and dry.   Ulceration ball of left foot, lateral pinky toe, dry no drainage   Psychiatric: Thought content normal.   Vitals reviewed.        Assessment & Plan     Independent Review of Radiographic Studies:  Detailed discussion regarding risks, benefits, and treatment plan. Images independently reviewed. Patient understands, agrees, and wishes to proceed with plan.      1. Mixed hyperlipidemia  Lipid-lowering therapy has been proven beneficial in patients with cardio-vascular disease. Current guidelines recommend statin treatment for " all patients with PAD,CAD and carotid stenosis. Statins are beneficial in preventing cardiovascular events, increasing functional capacity and lower the risk of adverse limb loss in PAD. Statins decrease the progression of plaque formation and may improve peripheral vessel lining, and aid in reversing atherosclerosis.    2. Benign essential hypertension  Controlled.     3. Type 2 diabetes mellitus with foot ulcer and gangrene (CMS/HCC)  Increased risk for worsening PAD, CAD, stent closure, and progressive carotid disease with uncontrolled DM.   No results found for: HGBA1C  Medical management: Diet. Oral Medications. Insulin. Other injectables.   Followed by PCP     4. End-stage renal disease on hemodialysis (CMS/HCC)  Continue dialysis as scheduled. If problems should arise including difficulty with access, high pressure alarms, or inability to complete dialysis please [facility] immediately for evaluation.  [facility]    5. Tobacco dependence  Smoking cessation assistance options offered including behavioral counseling (Smoking Cessation Classes), Nicotine replacement therapy (patches or gum), pharmacologic therapy (Chantix, Wellbutrin). Understands tobacco increases risk of expanding AAA, MI, CVA, PAD, carcinoma. Discussion and question answer period 5-7 minutes. Darwin Morrell  reports that he has been smoking. He has a 27.50 pack-year smoking history. He has quit using smokeless tobacco.. I have educated him on the risk of diseases from using tobacco products such as cancer, COPD and heart disease.     I advised him to quit and he is not willing to quit.    I spent 5 minutes counseling the patient.      6. 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 Patient is to notify [facility] for immediate evaluation.   Return 6 weeks - TOMMY  - Rivaroxaban (Xarelto) 2.5 MG tablet; Take 1 tablet by mouth 2 (Two) Times a Day.  Dispense: 60 tablet  - Doppler Ankle Brachial Index Single Level CAR; Future       Patient's Body mass index is 25.8 kg/m². indicating that he is within normal range (BMI 18.5-24.9). No BMI management plan needed..       Advance Care Planning discussed and  Informational packet given to patient. Advance Care Plan on file: No    Time spent 30 minutes in face to face evaluation, reviewing of medical history, tests, and procedures. Independent interpretation of vascular studies, ordering additional tests, documentation, and coordination of care.   Counseling and education with the patient and family regarding treatment options, plan of care, and hoped for outcomes. All questions answered.            This document has been electronically signed by KIMBERLY Villalpando on July 28, 2021 13:13 CDT

## 2021-07-28 PROBLEM — I70.231: Status: RESOLVED | Noted: 2020-05-05 | Resolved: 2021-07-28

## 2021-07-28 RX ORDER — CLOPIDOGREL BISULFATE 75 MG/1
75 TABLET ORAL DAILY
Qty: 30 TABLET | Refills: 0
Start: 2021-07-28 | End: 2022-06-30 | Stop reason: SDUPTHER

## 2021-07-29 ENCOUNTER — OFFICE VISIT (OUTPATIENT)
Dept: WOUND CARE | Facility: HOSPITAL | Age: 66
End: 2021-07-29

## 2021-08-05 ENCOUNTER — OFFICE VISIT (OUTPATIENT)
Dept: WOUND CARE | Facility: HOSPITAL | Age: 66
End: 2021-08-05

## 2021-08-10 ENCOUNTER — OFFICE VISIT (OUTPATIENT)
Dept: WOUND CARE | Facility: HOSPITAL | Age: 66
End: 2021-08-10

## 2021-08-31 ENCOUNTER — OFFICE VISIT (OUTPATIENT)
Dept: WOUND CARE | Facility: HOSPITAL | Age: 66
End: 2021-08-31

## 2021-09-07 ENCOUNTER — OFFICE VISIT (OUTPATIENT)
Dept: CARDIAC SURGERY | Facility: CLINIC | Age: 66
End: 2021-09-07

## 2021-09-07 VITALS
WEIGHT: 175 LBS | SYSTOLIC BLOOD PRESSURE: 144 MMHG | OXYGEN SATURATION: 98 % | HEIGHT: 70 IN | DIASTOLIC BLOOD PRESSURE: 80 MMHG | HEART RATE: 80 BPM | TEMPERATURE: 97.7 F | BODY MASS INDEX: 25.05 KG/M2

## 2021-09-07 DIAGNOSIS — N18.5 CKD (CHRONIC KIDNEY DISEASE) STAGE 5, GFR LESS THAN 15 ML/MIN (HCC): ICD-10-CM

## 2021-09-07 DIAGNOSIS — E78.2 MIXED HYPERLIPIDEMIA: ICD-10-CM

## 2021-09-07 DIAGNOSIS — I10 BENIGN ESSENTIAL HYPERTENSION: ICD-10-CM

## 2021-09-07 DIAGNOSIS — I65.23 CAROTID STENOSIS, ASYMPTOMATIC, BILATERAL: ICD-10-CM

## 2021-09-07 DIAGNOSIS — I73.9 PVD (PERIPHERAL VASCULAR DISEASE) (HCC): Primary | ICD-10-CM

## 2021-09-07 DIAGNOSIS — F17.200 TOBACCO DEPENDENCE: ICD-10-CM

## 2021-09-07 PROCEDURE — 99214 OFFICE O/P EST MOD 30 MIN: CPT | Performed by: NURSE PRACTITIONER

## 2021-09-07 NOTE — PATIENT INSTRUCTIONS
mild reduction Arterial Flow left Lower Extremity improved  Medical Management: PLAVIX,STATIN,  PVD Dosing XARELTO  If signs and symptoms of ischemia should occur including but not limited to pale/blue discoloration of limb, increasing pain with ambulation or at rest, or a non-healing wound. Patient is to notify [facility] center for immediate evaluation.   Return 3 mos- TOMMY    Carotid Stenosis  Asymptomatic  If you should experience any neurological symptoms including but not limited to visual or speech disturbances confusion, seizures, or weakness of limbs of one side of your body notify [facility] and Vascular center immediately for evaluation or if after hours present to the nearest Emergency Department.    Return 3 mos- Duplex     Smoking cessation advised.  Tobacco increases risk of expanding AAA, MI, CVA, PAD, carcinoma.

## 2021-09-07 NOTE — PROGRESS NOTES
CVTS Office Progress Note       Subjective   Patient ID: Darwin Morrell is a 66 y.o. male is being seen for follow up.  Chief Complaint:    Chief Complaint   Patient presents with   • Peripheral Vascular Disease       The following portions of the patient's history were reviewed and updated as appropriate: allergies, current medications, past family history, past medical history, past social history, past surgical history and problem list.  Recent images independently reviewed.  Available laboratory values reviewed.    PCP:  Morgan Tovar MD   Nephrology:  [facility] ([facility]) MWF     66 y.o. male with HTN(stable, increased risk stroke, rupture), Hyperlipidemia(stable, increased risk cardiovascular events), Diabetes Mellitus(stable, increased risk cardiovascular events), Smoker(uncontrolled, increased risk cardiovascular events) and Chronic Kidney Disease stage 5(stable, on dialysis)  Diabetic Ulcer RIGHT great toe. Callus x 6 weeks, now with dry gangrene. Has been soaking (epsom salts) at home and applying antibiotic cream.  smokes 1 PPD.  No TIA stroke amaurosis.  No MI claudication. No other associated signs, symptoms or modifying factors.      RIGHT CEA    5/5/2020:  TOMMY: RIGHT 0.5 Biphasic (CFV-DP) LEFT 0.78 Tri/Biphasic (DP/PT)  5/2020 CTA Aorta runoff:  RIGHT external iliac artery 80%, SFA moderate diffuse disease, 3v runoff.  LEFT CFA 70%, SFA 90% prox 70% distal. 2v runoff.  5/14/2020: PTA/Stent RIGHT EIA  5/21/2020 TOMMY: RIGHT 1.0 Tri/Biphasic(PT/DP)  LEFT 0.7 Tri/Biphasic (PT)  7/1/2020:  TOMMY: RIGHT 0.95 Triphasic LEFT 0.71 Tri/Biphasic (Pop/DP)  10/21/2020: TOMMY: RIGHT 1.0 Triphasic LEFT 0.81 Tri/Biphasic (Pop/DP). REIA stent patent-triphasic  4/22/2021: TOMMY: RIGHT 1.1 Triphasic LEFT 0.69 Tri/Biphasic (Pop/DP)   6/2021 TOMMY: Right 1.05 triphasic.  Left 0.68 biphasic  7/8/21:PTA LEFT SFA/CFA  7/27/21: TOMMY: RIGHT 1.0 Triphasic LEFT 0.73 Biphasic   9/7/21: TOMMY: RIGHT 1.0 Triphasic LEFT 0.95  Triphasic/Biphasic (PT)     2/2018 Echocardiogram:  EF 60%, LA 40mm, LV 48mm, RVSP 39mmHg.  Tr MR TR.      Past Medical History:   Diagnosis Date   • Callus    • Diabetes mellitus (CMS/HCC)    • ESRD (end stage renal disease) (CMS/HCC)    • Foot ulcer (CMS/HCC)    • GERD (gastroesophageal reflux disease)    • Hyperlipidemia    • Hypertension    • Smoker      Past Surgical History:   Procedure Laterality Date   • AMPUTATION     • AMPUTATION DIGIT Right 5/28/2020    Procedure: HULLUX AMPUTATION AND ALL OTHER INDICATED PROCDURES;  Surgeon: David Murguia DPM;  Location: [facility] OR;  Service: Podiatry;  Laterality: Right;   • APPENDECTOMY     • ARTERIOGRAM N/A 5/14/2020    Procedure: abdominal aortogram bilateral lower extremity runoff possible angioplasty stent thrombolysis atherectomy;  Surgeon: Ruben Gustafson MD;  Location: [facility] ANGIO INVASIVE LOCATION;  Service: Interventional Radiology;  Laterality: N/A;   • ARTERIOVENOUS FISTULA Right 02/2019   • CAROTID ENDARTERECTOMY Right    • INTERVENTIONAL RADIOLOGY PROCEDURE Left 7/8/2021    Procedure: Abdominal Aortagram with Runoff;  Surgeon: Ruben Gustafson MD;  Location: [facility] ANGIO INVASIVE LOCATION;  Service: Interventional Radiology;  Laterality: Left;     Family History   Problem Relation Age of Onset   • Diabetes Mother    • Diabetes Father    • Diabetes Sister    • Cancer Brother    • Diabetes Brother      Social History     Tobacco Use   • Smoking status: Current Every Day Smoker     Packs/day: 0.50     Years: 55.00     Pack years: 27.50   • Smokeless tobacco: Former User   Substance Use Topics   • Alcohol use: Yes     Comment: used to drink   • Drug use: No       ALLERGIES:   Patient has no known allergies.    MEDICATIONS:      Current Outpatient Medications:   •  atorvastatin (LIPITOR) 80 MG tablet, Take 1 tablet by mouth Every Night. (Patient taking differently: Take 40 mg by mouth Every Night.), Disp: 30 tablet, Rfl: 0  •  calcitriol  (ROCALTROL) 0.5 MCG capsule, Take 1 capsule by mouth Daily., Disp: 30 capsule, Rfl: 0  •  calcium acetate (PHOS BINDER,) 667 MG capsule capsule, Take 1,334 mg by mouth 3 (Three) Times a Day., Disp: , Rfl:   •  carvedilol (COREG) 12.5 MG tablet, Take 12.5 mg by mouth 2 (Two) Times a Day With Meals., Disp: , Rfl:   •  clopidogrel (PLAVIX) 75 MG tablet, Take 1 tablet by mouth Daily., Disp: 30 tablet, Rfl: 0  •  eplerenone (INSPRA) 25 MG tablet, Take 50 mg by mouth Daily., Disp: , Rfl:   •  HYDROcodone-acetaminophen (NORCO) 7.5-325 MG per tablet, Take 1 tablet by mouth Every 8 (Eight) Hours As Needed for Moderate Pain ., Disp: , Rfl:   •  insulin detemir (LEVEMIR) 100 UNIT/ML injection, Inject 20 Units under the skin into the appropriate area as directed Every Night., Disp: 10 mL, Rfl: 0  •  insulin lispro (humaLOG) 100 UNIT/ML injection, Inject 10 Units under the skin into the appropriate area as directed 3 (Three) Times a Day Before Meals. Sliding scale, Disp: , Rfl:   •  lidocaine-prilocaine (EMLA) 2.5-2.5 % cream, Apply 1 application topically to the appropriate area as directed 1 (One) Time. m-w-f, Disp: , Rfl:   •  losartan (COZAAR) 25 MG tablet, Take 100 mg by mouth Daily., Disp: , Rfl:   •  NIFEdipine XL (PROCARDIA XL) 30 MG 24 hr tablet, Take 30 mg by mouth Daily., Disp: , Rfl:   •  nitroglycerin (NITROSTAT) 0.4 MG SL tablet, Place 1 tablet under the tongue Every 5 (Five) Minutes As Needed for Chest Pain. Take no more than 3 doses in 15 minutes., Disp: 100 tablet, Rfl: 12  •  omeprazole (priLOSEC) 20 MG capsule, Take 20 mg by mouth Daily., Disp: , Rfl:   •  Rivaroxaban (Xarelto) 2.5 MG tablet, Take 1 tablet by mouth 2 (Two) Times a Day., Disp: 60 tablet, Rfl:   •  torsemide (DEMADEX) 20 MG tablet, Take 20 mg by mouth 3 (Three) Times a Day., Disp: , Rfl:   •  vitamin D (ERGOCALCIFEROL) 1.25 MG (54443 UT) capsule capsule, Take 50,000 Units by mouth 1 (One) Time Per Week., Disp: , Rfl:   •  Wound Dressings  "([facility] Wound/Burn Dressing) gel, Apply  topically Daily., Disp: , Rfl:     Review of Systems   Constitutional: Negative for fever.   Eyes: Negative for visual disturbance.   Cardiovascular: Positive for chest pain. Negative for claudication, cyanosis and leg swelling.   Respiratory: Negative for shortness of breath.    Skin: Positive for color change, dry skin, nail changes and poor wound healing.   Musculoskeletal: Positive for arthritis. Negative for muscle weakness.   Gastrointestinal: Negative for dysphagia.   Neurological: Negative for focal weakness, light-headedness, loss of balance, numbness, paresthesias and weakness.   Psychiatric/Behavioral: Negative for altered mental status.   All other systems reviewed and are negative.       Objective   Vitals:    09/07/21 0935   BP: 144/80   BP Location: Left arm   Patient Position: Sitting   Cuff Size: Adult   Pulse: 80   Temp: 97.7 °F (36.5 °C)   TempSrc: Temporal   SpO2: 98%   Weight: 79.4 kg (175 lb)   Height: 177.8 cm (70\")     Body mass index is 25.11 kg/m².  Physical Exam   Constitutional: He is oriented to person, place, and time.   HENT:   Head: Atraumatic.   Cardiovascular: Normal rate and normal heart sounds.   Pulses:       Dorsalis pedis pulses are 2+ on the right side and 1+ on the left side.        Posterior tibial pulses are 2+ on the right side and 1+ on the left side.   (R) AV Fistula: +thrill/bruit   Pulmonary/Chest: Effort normal and breath sounds normal.   Abdominal: Soft. Bowel sounds are normal.   Musculoskeletal: Normal range of motion.   Neurological: He is alert and oriented to person, place, and time. Gait normal.   Skin: Skin is warm and dry.   Ulceration left foot-healing   Psychiatric: Thought content normal.   Vitals reviewed.        Assessment & Plan     Independent Review of Radiographic Studies:  Detailed discussion regarding risks, benefits, and treatment plan. Images independently reviewed. Patient understands, agrees, and " wishes to proceed with plan.      1. PVD (peripheral vascular disease) (CMS/HCC)  mild reduction Arterial Flow left Lower Extremity improved  Medical Management: PLAVIX,STATIN,  PVD Dosing XARELTO  If signs and symptoms of ischemia should occur including but not limited to pale/blue discoloration of limb, increasing pain with ambulation or at rest, or a non-healing wound. Patient is to notify [facility] center for immediate evaluation.   Return 3 mos- TOMMY  - Doppler Ankle Brachial Index Single Level CAR; Future    2. Carotid stenosis, asymptomatic, bilateral  If you should experience any neurological symptoms including but not limited to visual or speech disturbances confusion, seizures, or weakness of limbs of one side of your body notify [facility] and Vascular center immediately for evaluation or if after hours present to the nearest Emergency Department.    - Duplex Carotid Ultrasound CAR; Future    3. Benign essential hypertension  Controlled.     4. Mixed hyperlipidemia  Lipid-lowering therapy has been proven beneficial in patients with cardio-vascular disease. Current guidelines recommend statin treatment for all patients with PAD,CAD and carotid stenosis. Statins are beneficial in preventing cardiovascular events, increasing functional capacity and lower the risk of adverse limb loss in PAD. Statins decrease the progression of plaque formation and may improve peripheral vessel lining, and aid in reversing atherosclerosis.       5. Tobacco dependence  Smoking cessation assistance options offered including behavioral counseling (Smoking Cessation Classes), Nicotine replacement therapy (patches or gum), pharmacologic therapy (Chantix, Wellbutrin). Understands tobacco increases risk of expanding AAA, MI, CVA, PAD, carcinoma. Discussion and question answer period 5-7 minutes. Darwin Hipolito Morrell  reports that he has been smoking. He has a 27.50 pack-year smoking history. He has quit using smokeless tobacco.. I  have educated him on the risk of diseases from using tobacco products such as cancer, COPD and heart disease.     I advised him to quit and he is not willing to quit.    I spent 5 minutes counseling the patient.      6. CKD (chronic kidney disease) stage 5, GFR less than 15 ml/min (CMS/[facility])  Patent RIGHT access  Follows with [facility] ([place])  Continue dialysis as scheduled. If problems should arise including difficulty with access, high pressure alarms, or inability to complete dialysis please notify [facility] immediately for evaluation.        Advance Care Planning discussed and  Informational packet given to patient. Advance Care Plan on file: No    Patient's Body mass index is 25.11 kg/m². indicating that he is within normal range (BMI 18.5-24.9). No BMI management plan needed..     Time spent 30 minutes in face to face evaluation, reviewing of medical history, tests, and procedures. Independent interpretation of vascular studies, ordering additional tests, documentation, and coordination of care.   Counseling and education with the patient and family regarding treatment options, plan of care, and hoped for outcomes. All questions answered.           This document has been electronically signed by KIMBERLY Villalpando on September 7, 2021 11:48 CDT

## 2021-09-21 ENCOUNTER — OFFICE VISIT (OUTPATIENT)
Dept: WOUND CARE | Facility: HOSPITAL | Age: 66
End: 2021-09-21

## 2021-09-28 ENCOUNTER — OFFICE VISIT (OUTPATIENT)
Dept: WOUND CARE | Facility: HOSPITAL | Age: 66
End: 2021-09-28

## 2021-12-14 ENCOUNTER — OFFICE VISIT (OUTPATIENT)
Dept: CARDIAC SURGERY | Facility: CLINIC | Age: 66
End: 2021-12-14

## 2021-12-14 VITALS
OXYGEN SATURATION: 96 % | WEIGHT: 175 LBS | HEIGHT: 70 IN | SYSTOLIC BLOOD PRESSURE: 122 MMHG | DIASTOLIC BLOOD PRESSURE: 70 MMHG | BODY MASS INDEX: 25.05 KG/M2 | HEART RATE: 71 BPM

## 2021-12-14 DIAGNOSIS — I10 BENIGN ESSENTIAL HYPERTENSION: ICD-10-CM

## 2021-12-14 DIAGNOSIS — E78.2 MIXED HYPERLIPIDEMIA: ICD-10-CM

## 2021-12-14 DIAGNOSIS — I73.9 PVD (PERIPHERAL VASCULAR DISEASE) (HCC): Primary | ICD-10-CM

## 2021-12-14 DIAGNOSIS — F17.200 TOBACCO DEPENDENCE: ICD-10-CM

## 2021-12-14 DIAGNOSIS — N18.5 CKD (CHRONIC KIDNEY DISEASE) STAGE 5, GFR LESS THAN 15 ML/MIN (HCC): ICD-10-CM

## 2021-12-14 DIAGNOSIS — I65.23 CAROTID STENOSIS, ASYMPTOMATIC, BILATERAL: ICD-10-CM

## 2021-12-14 PROCEDURE — 99214 OFFICE O/P EST MOD 30 MIN: CPT | Performed by: NURSE PRACTITIONER

## 2021-12-14 NOTE — PATIENT INSTRUCTIONS
moderate Carotid Artery Stenosis bilateral remained stable Asymptomatic   Medical Management: PLAVIX,STATIN   If you should experience any neurological symptoms including but not limited to visual or speech disturbances confusion, seizures, or weakness of limbs of one side of your body notify Heart and Vascular center immediately for evaluation or if after hours present to the nearest Emergency Department.    Return 6 mos    mild reduction Arterial Flow left Lower Extremity remained stable  Medical Management:PLAVIX,STATIN, Xarelto (PVD dosing)  If signs and symptoms of ischemia should occur including but not limited to pale/blue discoloration of limb, increasing pain with ambulation or at rest, or a non-healing wound. Patient is to notify [facility] center for immediate evaluation.   Return 6 mos    Smoking cessation advised.  Tobacco increases risk of expanding AAA, MI, CVA, PAD, carcinoma.

## 2021-12-14 NOTE — PROGRESS NOTES
CVTS Office Progress Note       Subjective   Patient ID: Darwin Morrell is a 66 y.o. male is being seen for follow up.  Chief Complaint:    Chief Complaint   Patient presents with   • Peripheral Vascular Disease       The following portions of the patient's history were reviewed and updated as appropriate: allergies, current medications, past family history, past medical history, past social history, past surgical history and problem list.  Recent images independently reviewed.  Available laboratory values reviewed.    PCP:  Morgan Tovar MD   Nephrology:  [facility] MWF     66 y.o. male with HTN(stable, increased risk stroke, rupture), Hyperlipidemia(stable, increased risk cardiovascular events), Diabetes Mellitus(stable, increased risk cardiovascular events), Smoker(uncontrolled, increased risk cardiovascular events) and Chronic Kidney Disease stage 5(stable, on dialysis)  Diabetic Ulcer RIGHT great toe. Callus x 6 weeks, now with dry gangrene. Has been soaking (epsom salts) at home and applying antibiotic cream.  smokes 1 PPD.  No TIA stroke amaurosis.  No MI claudication. No other associated signs, symptoms or modifying factors.      RIGHT CEA  12/2021 Carotid Duplex: RIGHT 50-69% (164/1.2) LEFT 50-69% (154/1.3) Antegrade      5/5/2020:  TOMMY: RIGHT 0.5 Biphasic (CFV-DP) LEFT 0.78 Tri/Biphasic (DP/PT)  5/2020 CTA Aorta runoff:  RIGHT external iliac artery 80%, SFA moderate diffuse disease, 3v runoff.  LEFT CFA 70%, SFA 90% prox 70% distal. 2v runoff.  5/14/2020: PTA/Stent RIGHT EIA  5/21/2020 TOMMY: RIGHT 1.0 Tri/Biphasic(PT/DP)  LEFT 0.7 Tri/Biphasic (PT)  7/1/2020:  TOMMY: RIGHT 0.95 Triphasic LEFT 0.71 Tri/Biphasic (Pop/DP)  10/21/2020: TOMMY: RIGHT 1.0 Triphasic LEFT 0.81 Tri/Biphasic (Pop/DP). REIA stent patent-triphasic  4/22/2021: TOMMY: RIGHT 1.1 Triphasic LEFT 0.69 Tri/Biphasic (Pop/DP)   6/2021 TOMMY: Right 1.05 triphasic.  Left 0.68 biphasic  7/8/21:PTA LEFT SFA/CFA  7/27/21: TOMMY: RIGHT 1.0  Triphasic LEFT 0.73 Biphasic   9/7/21: TOMMY: RIGHT 1.0 Triphasic LEFT 0.95 Triphasic/Biphasic (PT)   12/14/2021: TOMMY: RIGHT 1.0 Triphasic LEFT 0.85 Tri/Biphasic (PT)     2/2018 Echocardiogram:  EF 60%, LA 40mm, LV 48mm, RVSP 39mmHg.  Tr MR TR.      Past Medical History:   Diagnosis Date   • Callus    • Diabetes mellitus (HCC)    • ESRD (end stage renal disease) (HCC)    • Foot ulcer (HCC)    • GERD (gastroesophageal reflux disease)    • Hyperlipidemia    • Hypertension    • Smoker      Past Surgical History:   Procedure Laterality Date   • AMPUTATION     • AMPUTATION DIGIT Right 5/28/2020    Procedure: HULLUX AMPUTATION AND ALL OTHER INDICATED PROCDURES;  Surgeon: David Murguia DPM;  Location: [facility] OR;  Service: Podiatry;  Laterality: Right;   • APPENDECTOMY     • ARTERIOGRAM N/A 5/14/2020    Procedure: abdominal aortogram bilateral lower extremity runoff possible angioplasty stent thrombolysis atherectomy;  Surgeon: Ruben Gustafson MD;  Location: [facility]GIO INVASIVE LOCATION;  Service: Interventional Radiology;  Laterality: N/A;   • ARTERIOVENOUS FISTULA Right 02/2019   • CAROTID ENDARTERECTOMY Right    • INTERVENTIONAL RADIOLOGY PROCEDURE Left 7/8/2021    Procedure: Abdominal Aortagram with Runoff;  Surgeon: Ruben Gustafson MD;  Location: [facility]GIO INVASIVE LOCATION;  Service: Interventional Radiology;  Laterality: Left;     Family History   Problem Relation Age of Onset   • Diabetes Mother    • Diabetes Father    • Diabetes Sister    • Cancer Brother    • Diabetes Brother      Social History     Tobacco Use   • Smoking status: Current Every Day Smoker     Packs/day: 0.50     Years: 55.00     Pack years: 27.50   • Smokeless tobacco: Former User   Substance Use Topics   • Alcohol use: Yes     Comment: used to drink   • Drug use: No       ALLERGIES:   Patient has no known allergies.    MEDICATIONS:      Current Outpatient Medications:   •  atorvastatin (LIPITOR) 80 MG tablet, Take 1 tablet  by mouth Every Night. (Patient taking differently: Take 40 mg by mouth Every Night.), Disp: 30 tablet, Rfl: 0  •  calcitriol (ROCALTROL) 0.5 MCG capsule, Take 1 capsule by mouth Daily., Disp: 30 capsule, Rfl: 0  •  calcium acetate (PHOS BINDER,) 667 MG capsule capsule, Take 1,334 mg by mouth 3 (Three) Times a Day., Disp: , Rfl:   •  carvedilol (COREG) 12.5 MG tablet, Take 12.5 mg by mouth 2 (Two) Times a Day With Meals., Disp: , Rfl:   •  clopidogrel (PLAVIX) 75 MG tablet, Take 1 tablet by mouth Daily., Disp: 30 tablet, Rfl: 0  •  eplerenone (INSPRA) 25 MG tablet, Take 50 mg by mouth Daily., Disp: , Rfl:   •  HYDROcodone-acetaminophen (NORCO) 7.5-325 MG per tablet, Take 1 tablet by mouth Every 8 (Eight) Hours As Needed for Moderate Pain ., Disp: , Rfl:   •  insulin detemir (LEVEMIR) 100 UNIT/ML injection, Inject 20 Units under the skin into the appropriate area as directed Every Night., Disp: 10 mL, Rfl: 0  •  insulin lispro (humaLOG) 100 UNIT/ML injection, Inject 10 Units under the skin into the appropriate area as directed 3 (Three) Times a Day Before Meals. Sliding scale, Disp: , Rfl:   •  lidocaine-prilocaine (EMLA) 2.5-2.5 % cream, Apply 1 application topically to the appropriate area as directed 1 (One) Time. m-w-f, Disp: , Rfl:   •  losartan (COZAAR) 25 MG tablet, Take 100 mg by mouth Daily., Disp: , Rfl:   •  NIFEdipine XL (PROCARDIA XL) 30 MG 24 hr tablet, Take 30 mg by mouth Daily., Disp: , Rfl:   •  nitroglycerin (NITROSTAT) 0.4 MG SL tablet, Place 1 tablet under the tongue Every 5 (Five) Minutes As Needed for Chest Pain. Take no more than 3 doses in 15 minutes., Disp: 100 tablet, Rfl: 12  •  omeprazole (priLOSEC) 20 MG capsule, Take 20 mg by mouth Daily., Disp: , Rfl:   •  Rivaroxaban (Xarelto) 2.5 MG tablet, Take 1 tablet by mouth 2 (Two) Times a Day., Disp: 60 tablet, Rfl:   •  torsemide (DEMADEX) 20 MG tablet, Take 20 mg by mouth 3 (Three) Times a Day., Disp: , Rfl:   •  vitamin D (ERGOCALCIFEROL)  "1.25 MG (94518 UT) capsule capsule, Take 50,000 Units by mouth 1 (One) Time Per Week., Disp: , Rfl:   •  Wound Dressings (Medihoney Wound/Burn Dressing) gel, Apply  topically Daily., Disp: , Rfl:     Review of Systems   Constitutional: Negative for fever.   Eyes: Negative for visual disturbance.   Cardiovascular: Positive for chest pain. Negative for claudication, cyanosis and leg swelling.   Respiratory: Negative for shortness of breath.    Skin: Positive for color change, dry skin, nail changes and poor wound healing.   Musculoskeletal: Positive for arthritis. Negative for muscle weakness.   Gastrointestinal: Negative for dysphagia.   Neurological: Negative for focal weakness, light-headedness, loss of balance, numbness, paresthesias and weakness.   Psychiatric/Behavioral: Negative for altered mental status.   All other systems reviewed and are negative.       Objective   Vitals:    12/14/21 0902   BP: 122/70   BP Location: Left arm   Patient Position: Sitting   Cuff Size: Adult   Pulse: 71   SpO2: 96%   Weight: 79.4 kg (175 lb)   Height: 177.8 cm (70\")     Body mass index is 25.11 kg/m².  Physical Exam   Constitutional: He is oriented to person, place, and time.   HENT:   Head: Atraumatic.   Cardiovascular: Normal rate and normal heart sounds.   Pulses:       Dorsalis pedis pulses are 2+ on the right side and 1+ on the left side.        Posterior tibial pulses are 2+ on the right side and 1+ on the left side.   (R) AV Fistula: +thrill/bruit   Pulmonary/Chest: Effort normal and breath sounds normal.   Abdominal: Soft. Bowel sounds are normal.   Musculoskeletal: Normal range of motion.   Neurological: He is alert and oriented to person, place, and time. Gait normal.   Skin: Skin is warm and dry.   Ulceration left foot-healing   Psychiatric: Thought content normal.   Vitals reviewed.        Assessment & Plan     Independent Review of Radiographic Studies:  Detailed discussion regarding risks, benefits, and treatment " plan. Images independently reviewed. Patient understands, agrees, and wishes to proceed with plan.      1. PVD (peripheral vascular disease) (HCC)  mild reduction Arterial Flow left Lower Extremity remained stable  Medical Management:PLAVIX,STATIN, Xarelto (PVD dosing)  If signs and symptoms of ischemia should occur including but not limited to pale/blue discoloration of limb, increasing pain with ambulation or at rest, or a non-healing wound. Patient is to notify [facility] Vascular center for immediate evaluation.   Return 6 mos  - Doppler Ankle Brachial Index Single Level CAR; Future    Samples of Eliquis 2.5mg BID  were given to the patient.    Quantity 12 boxes  Lot # [id number]  Exp Date:  6/22    Notify provider if you experience excessive bleeding from the nose, cuts, gums, rectum, urinary tract, or vagina. Reddish or brown urine or stool. Vomiting of blood or hemorrhoidal bleeding. If major injury occurs present to the Emergency Department.      2. Carotid stenosis, asymptomatic, bilateral  moderate Carotid Artery Stenosis bilateral remained stable Asymptomatic   Medical Management: PLAVIX,STATIN   If you should experience any neurological symptoms including but not limited to visual or speech disturbances confusion, seizures, or weakness of limbs of one side of your body notify Heart and Vascular center immediately for evaluation or if after hours present to the nearest Emergency Department.    Return 6 mos  - Duplex Carotid Ultrasound CAR; Future    3. Benign essential hypertension  Medically managed.     4. Mixed hyperlipidemia  Lipid-lowering therapy has been proven beneficial in patients with cardio-vascular disease. Current guidelines recommend statin treatment for all patients with PAD,CAD and carotid stenosis. Statins are beneficial in preventing cardiovascular events, increasing functional capacity and lower the risk of adverse limb loss in PAD. Statins decrease the progression of plaque formation and  may improve peripheral vessel lining, and aid in reversing atherosclerosis.    5. CKD (chronic kidney disease) stage 5, GFR less than 15 ml/min (HCC)  Dialysis   MWF  Fistula management: [facility]    6. Tobacco dependence  Smoking cessation assistance options offered including behavioral counseling (Smoking Cessation Classes), Nicotine replacement therapy (patches or gum), pharmacologic therapy (Chantix, Wellbutrin). Understands tobacco increases risk of expanding AAA, MI, CVA, PAD, carcinoma. Discussion and question answer period 5-7 minutes. Darwin Morrell  reports that he has been smoking. He has a 27.50 pack-year smoking history. He has quit using smokeless tobacco.. I have educated him on the risk of diseases from using tobacco products such as cancer, COPD and heart disease.     I advised him to quit and he is not willing to quit.    I spent 5 minutes counseling the patient.          Advance Care Planning discussed and  Informational packet given to patient. Advance Care Plan on file: No    Patient's Body mass index is 25.11 kg/m². indicating that he is within normal range (BMI 18.5-24.9). No BMI management plan needed..     Time spent 30 minutes in face to face evaluation, reviewing of medical history, tests, and procedures. Independent interpretation of vascular studies, ordering additional tests, documentation, and coordination of care.   Counseling and education with the patient and family regarding treatment options, plan of care, and hoped for outcomes. All questions answered.           This document has been electronically signed by KIMBERLY Villalpando on December 14, 2021 11:57 CST

## 2022-04-11 ENCOUNTER — HOSPITAL ENCOUNTER (INPATIENT)
Facility: HOSPITAL | Age: 67
LOS: 1 days | Discharge: HOME | End: 2022-04-12
Attending: STUDENT IN AN ORGANIZED HEALTH CARE EDUCATION/TRAINING PROGRAM | Admitting: STUDENT IN AN ORGANIZED HEALTH CARE EDUCATION/TRAINING PROGRAM

## 2022-04-11 ENCOUNTER — APPOINTMENT (OUTPATIENT)
Dept: GENERAL RADIOLOGY | Facility: HOSPITAL | Age: 67
End: 2022-04-11

## 2022-04-11 LAB
ALBUMIN SERPL-MCNC: 3.2 G/DL (ref 3.5–5.2)
ALBUMIN/GLOB SERPL: 1.1 G/DL
ALP SERPL-CCNC: 98 U/L (ref 39–117)
ALT SERPL W P-5'-P-CCNC: 21 U/L (ref 1–41)
ANION GAP SERPL CALCULATED.3IONS-SCNC: 13 MMOL/L (ref 5–15)
AST SERPL-CCNC: 25 U/L (ref 1–40)
BASOPHILS # BLD AUTO: 0.07 10*3/MM3 (ref 0–0.2)
BASOPHILS NFR BLD AUTO: 0.7 % (ref 0–1.5)
BILIRUB SERPL-MCNC: 0.6 MG/DL (ref 0–1.2)
BUN SERPL-MCNC: 15 MG/DL (ref 8–23)
BUN/CREAT SERPL: 3.2 (ref 7–25)
CALCIUM SPEC-SCNC: 8.9 MG/DL (ref 8.6–10.5)
CHLORIDE SERPL-SCNC: 101 MMOL/L (ref 98–107)
CO2 SERPL-SCNC: 25 MMOL/L (ref 22–29)
CREAT SERPL-MCNC: 4.67 MG/DL (ref 0.76–1.27)
CRP SERPL-MCNC: 2.06 MG/DL (ref 0–0.5)
D-LACTATE SERPL-SCNC: 0.9 MMOL/L (ref 0.5–2)
DEPRECATED RDW RBC AUTO: 53.1 FL (ref 37–54)
EGFRCR SERPLBLD CKD-EPI 2021: 12.8 ML/MIN/1.73
EOSINOPHIL # BLD AUTO: 0.1 10*3/MM3 (ref 0–0.4)
EOSINOPHIL NFR BLD AUTO: 1 % (ref 0.3–6.2)
ERYTHROCYTE [DISTWIDTH] IN BLOOD BY AUTOMATED COUNT: 16.3 % (ref 12.3–15.4)
GLOBULIN UR ELPH-MCNC: 3 GM/DL
GLUCOSE SERPL-MCNC: 158 MG/DL (ref 65–99)
HCT VFR BLD AUTO: 32.5 % (ref 37.5–51)
HGB BLD-MCNC: 10.6 G/DL (ref 13–17.7)
IMM GRANULOCYTES # BLD AUTO: 0.08 10*3/MM3 (ref 0–0.05)
IMM GRANULOCYTES NFR BLD AUTO: 0.8 % (ref 0–0.5)
LYMPHOCYTES # BLD AUTO: 1.1 10*3/MM3 (ref 0.7–3.1)
LYMPHOCYTES NFR BLD AUTO: 11 % (ref 19.6–45.3)
MAGNESIUM SERPL-MCNC: 2 MG/DL (ref 1.6–2.4)
MCH RBC QN AUTO: 29.2 PG (ref 26.6–33)
MCHC RBC AUTO-ENTMCNC: 32.6 G/DL (ref 31.5–35.7)
MCV RBC AUTO: 89.5 FL (ref 79–97)
MONOCYTES # BLD AUTO: 0.75 10*3/MM3 (ref 0.1–0.9)
MONOCYTES NFR BLD AUTO: 7.5 % (ref 5–12)
NEUTROPHILS NFR BLD AUTO: 7.9 10*3/MM3 (ref 1.7–7)
NEUTROPHILS NFR BLD AUTO: 79 % (ref 42.7–76)
NRBC BLD AUTO-RTO: 0 /100 WBC (ref 0–0.2)
NT-PROBNP SERPL-MCNC: ABNORMAL PG/ML (ref 0–900)
PHOSPHATE SERPL-MCNC: 2 MG/DL (ref 2.5–4.5)
PLATELET # BLD AUTO: 156 10*3/MM3 (ref 140–450)
PMV BLD AUTO: 9.3 FL (ref 6–12)
POTASSIUM SERPL-SCNC: 4.2 MMOL/L (ref 3.5–5.2)
PROT SERPL-MCNC: 6.2 G/DL (ref 6–8.5)
RBC # BLD AUTO: 3.63 10*6/MM3 (ref 4.14–5.8)
SODIUM SERPL-SCNC: 139 MMOL/L (ref 136–145)
TROPONIN T SERPL-MCNC: 2.16 NG/ML (ref 0–0.03)
TSH SERPL DL<=0.05 MIU/L-ACNC: 0.91 UIU/ML (ref 0.27–4.2)
WBC NRBC COR # BLD: 10 10*3/MM3 (ref 3.4–10.8)

## 2022-04-11 PROCEDURE — 71045 X-RAY EXAM CHEST 1 VIEW: CPT

## 2022-04-11 PROCEDURE — 86140 C-REACTIVE PROTEIN: CPT | Performed by: STUDENT IN AN ORGANIZED HEALTH CARE EDUCATION/TRAINING PROGRAM

## 2022-04-11 PROCEDURE — 82962 GLUCOSE BLOOD TEST: CPT

## 2022-04-11 PROCEDURE — 83605 ASSAY OF LACTIC ACID: CPT | Performed by: STUDENT IN AN ORGANIZED HEALTH CARE EDUCATION/TRAINING PROGRAM

## 2022-04-11 PROCEDURE — 83880 ASSAY OF NATRIURETIC PEPTIDE: CPT | Performed by: STUDENT IN AN ORGANIZED HEALTH CARE EDUCATION/TRAINING PROGRAM

## 2022-04-11 PROCEDURE — 80053 COMPREHEN METABOLIC PANEL: CPT | Performed by: STUDENT IN AN ORGANIZED HEALTH CARE EDUCATION/TRAINING PROGRAM

## 2022-04-11 PROCEDURE — 84443 ASSAY THYROID STIM HORMONE: CPT | Performed by: STUDENT IN AN ORGANIZED HEALTH CARE EDUCATION/TRAINING PROGRAM

## 2022-04-11 PROCEDURE — 84484 ASSAY OF TROPONIN QUANT: CPT | Performed by: STUDENT IN AN ORGANIZED HEALTH CARE EDUCATION/TRAINING PROGRAM

## 2022-04-11 PROCEDURE — 93005 ELECTROCARDIOGRAM TRACING: CPT | Performed by: STUDENT IN AN ORGANIZED HEALTH CARE EDUCATION/TRAINING PROGRAM

## 2022-04-11 PROCEDURE — 93010 ELECTROCARDIOGRAM REPORT: CPT | Performed by: INTERNAL MEDICINE

## 2022-04-11 PROCEDURE — 83735 ASSAY OF MAGNESIUM: CPT | Performed by: STUDENT IN AN ORGANIZED HEALTH CARE EDUCATION/TRAINING PROGRAM

## 2022-04-11 PROCEDURE — 63710000001 INSULIN DETEMIR PER 5 UNITS: Performed by: STUDENT IN AN ORGANIZED HEALTH CARE EDUCATION/TRAINING PROGRAM

## 2022-04-11 PROCEDURE — 85025 COMPLETE CBC W/AUTO DIFF WBC: CPT | Performed by: STUDENT IN AN ORGANIZED HEALTH CARE EDUCATION/TRAINING PROGRAM

## 2022-04-11 PROCEDURE — 84100 ASSAY OF PHOSPHORUS: CPT | Performed by: STUDENT IN AN ORGANIZED HEALTH CARE EDUCATION/TRAINING PROGRAM

## 2022-04-11 RX ORDER — SODIUM CHLORIDE 0.9 % (FLUSH) 0.9 %
10 SYRINGE (ML) INJECTION AS NEEDED
Status: DISCONTINUED | OUTPATIENT
Start: 2022-04-11 | End: 2022-04-12 | Stop reason: HOSPADM

## 2022-04-11 RX ORDER — NICOTINE POLACRILEX 4 MG
15 LOZENGE BUCCAL
Status: DISCONTINUED | OUTPATIENT
Start: 2022-04-11 | End: 2022-04-12 | Stop reason: HOSPADM

## 2022-04-11 RX ORDER — CLOPIDOGREL BISULFATE 75 MG/1
75 TABLET ORAL DAILY
Status: DISCONTINUED | OUTPATIENT
Start: 2022-04-11 | End: 2022-04-12 | Stop reason: HOSPADM

## 2022-04-11 RX ORDER — NALOXONE HCL 0.4 MG/ML
0.4 VIAL (ML) INJECTION
Status: DISCONTINUED | OUTPATIENT
Start: 2022-04-11 | End: 2022-04-11

## 2022-04-11 RX ORDER — HYDROCODONE BITARTRATE AND ACETAMINOPHEN 7.5; 325 MG/1; MG/1
1 TABLET ORAL EVERY 8 HOURS PRN
Status: DISCONTINUED | OUTPATIENT
Start: 2022-04-11 | End: 2022-04-12 | Stop reason: HOSPADM

## 2022-04-11 RX ORDER — TORSEMIDE 10 MG/1
20 TABLET ORAL 3 TIMES DAILY
Status: DISCONTINUED | OUTPATIENT
Start: 2022-04-11 | End: 2022-04-12 | Stop reason: HOSPADM

## 2022-04-11 RX ORDER — ONDANSETRON 2 MG/ML
4 INJECTION INTRAMUSCULAR; INTRAVENOUS EVERY 6 HOURS PRN
Status: DISCONTINUED | OUTPATIENT
Start: 2022-04-11 | End: 2022-04-12 | Stop reason: HOSPADM

## 2022-04-11 RX ORDER — DEXTROSE MONOHYDRATE 25 G/50ML
25 INJECTION, SOLUTION INTRAVENOUS
Status: DISCONTINUED | OUTPATIENT
Start: 2022-04-11 | End: 2022-04-12 | Stop reason: HOSPADM

## 2022-04-11 RX ORDER — SODIUM CHLORIDE 0.9 % (FLUSH) 0.9 %
10 SYRINGE (ML) INJECTION EVERY 12 HOURS SCHEDULED
Status: DISCONTINUED | OUTPATIENT
Start: 2022-04-11 | End: 2022-04-12 | Stop reason: HOSPADM

## 2022-04-11 RX ORDER — HEPARIN SODIUM 5000 [USP'U]/ML
5000 INJECTION, SOLUTION INTRAVENOUS; SUBCUTANEOUS EVERY 8 HOURS SCHEDULED
Status: DISCONTINUED | OUTPATIENT
Start: 2022-04-11 | End: 2022-04-11

## 2022-04-11 RX ORDER — PANTOPRAZOLE SODIUM 40 MG/1
40 TABLET, DELAYED RELEASE ORAL EVERY MORNING
Status: DISCONTINUED | OUTPATIENT
Start: 2022-04-12 | End: 2022-04-12 | Stop reason: HOSPADM

## 2022-04-11 RX ORDER — NITROGLYCERIN 0.4 MG/1
0.4 TABLET SUBLINGUAL
Status: DISCONTINUED | OUTPATIENT
Start: 2022-04-11 | End: 2022-04-12 | Stop reason: HOSPADM

## 2022-04-11 RX ORDER — CARVEDILOL 12.5 MG/1
12.5 TABLET ORAL 2 TIMES DAILY WITH MEALS
Status: DISCONTINUED | OUTPATIENT
Start: 2022-04-11 | End: 2022-04-12 | Stop reason: HOSPADM

## 2022-04-11 RX ORDER — ATORVASTATIN CALCIUM 40 MG/1
40 TABLET, FILM COATED ORAL NIGHTLY
Status: DISCONTINUED | OUTPATIENT
Start: 2022-04-11 | End: 2022-04-12 | Stop reason: HOSPADM

## 2022-04-11 RX ORDER — NIFEDIPINE 30 MG/1
30 TABLET, EXTENDED RELEASE ORAL DAILY
Status: DISCONTINUED | OUTPATIENT
Start: 2022-04-11 | End: 2022-04-11

## 2022-04-11 RX ORDER — LANOLIN ALCOHOL/MO/W.PET/CERES
6 CREAM (GRAM) TOPICAL NIGHTLY PRN
Status: DISCONTINUED | OUTPATIENT
Start: 2022-04-11 | End: 2022-04-12 | Stop reason: HOSPADM

## 2022-04-11 RX ORDER — CALCITRIOL 0.25 UG/1
0.5 CAPSULE, LIQUID FILLED ORAL DAILY
Status: DISCONTINUED | OUTPATIENT
Start: 2022-04-12 | End: 2022-04-11

## 2022-04-11 RX ORDER — NIFEDIPINE 10 MG/1
30 CAPSULE ORAL EVERY 12 HOURS
Status: DISCONTINUED | OUTPATIENT
Start: 2022-04-11 | End: 2022-04-12 | Stop reason: HOSPADM

## 2022-04-11 RX ORDER — LOSARTAN POTASSIUM 50 MG/1
100 TABLET ORAL DAILY
Status: DISCONTINUED | OUTPATIENT
Start: 2022-04-11 | End: 2022-04-12 | Stop reason: HOSPADM

## 2022-04-11 RX ADMIN — Medication 10 ML: at 22:39

## 2022-04-11 RX ADMIN — MELATONIN 6 MG: 3 TAB ORAL at 23:22

## 2022-04-11 RX ADMIN — TORSEMIDE 20 MG: 10 TABLET ORAL at 22:36

## 2022-04-11 RX ADMIN — INSULIN DETEMIR 4 UNITS: 100 INJECTION, SOLUTION SUBCUTANEOUS at 23:30

## 2022-04-11 RX ADMIN — ATORVASTATIN CALCIUM 40 MG: 40 TABLET, FILM COATED ORAL at 22:35

## 2022-04-12 ENCOUNTER — READMISSION MANAGEMENT (OUTPATIENT)
Dept: CALL CENTER | Facility: HOSPITAL | Age: 67
End: 2022-04-12

## 2022-04-12 VITALS
HEART RATE: 72 BPM | RESPIRATION RATE: 18 BRPM | HEIGHT: 70 IN | DIASTOLIC BLOOD PRESSURE: 76 MMHG | SYSTOLIC BLOOD PRESSURE: 177 MMHG | TEMPERATURE: 98.4 F | WEIGHT: 168.5 LBS | BODY MASS INDEX: 24.12 KG/M2 | OXYGEN SATURATION: 96 %

## 2022-04-12 LAB
ANION GAP SERPL CALCULATED.3IONS-SCNC: 10 MMOL/L (ref 5–15)
BASOPHILS # BLD AUTO: 0.05 10*3/MM3 (ref 0–0.2)
BASOPHILS NFR BLD AUTO: 0.5 % (ref 0–1.5)
BUN SERPL-MCNC: 19 MG/DL (ref 8–23)
BUN/CREAT SERPL: 3.6 (ref 7–25)
CALCIUM SPEC-SCNC: 8.4 MG/DL (ref 8.6–10.5)
CHLORIDE SERPL-SCNC: 102 MMOL/L (ref 98–107)
CO2 SERPL-SCNC: 28 MMOL/L (ref 22–29)
CREAT SERPL-MCNC: 5.26 MG/DL (ref 0.76–1.27)
DEPRECATED RDW RBC AUTO: 51.6 FL (ref 37–54)
EGFRCR SERPLBLD CKD-EPI 2021: 11.1 ML/MIN/1.73
EOSINOPHIL # BLD AUTO: 0.09 10*3/MM3 (ref 0–0.4)
EOSINOPHIL NFR BLD AUTO: 0.9 % (ref 0.3–6.2)
ERYTHROCYTE [DISTWIDTH] IN BLOOD BY AUTOMATED COUNT: 16.1 % (ref 12.3–15.4)
GLUCOSE BLDC GLUCOMTR-MCNC: 125 MG/DL (ref 70–130)
GLUCOSE BLDC GLUCOMTR-MCNC: 164 MG/DL (ref 70–130)
GLUCOSE BLDC GLUCOMTR-MCNC: 180 MG/DL (ref 70–130)
GLUCOSE SERPL-MCNC: 211 MG/DL (ref 65–99)
HCT VFR BLD AUTO: 30.2 % (ref 37.5–51)
HGB BLD-MCNC: 10.3 G/DL (ref 13–17.7)
IMM GRANULOCYTES # BLD AUTO: 0.08 10*3/MM3 (ref 0–0.05)
IMM GRANULOCYTES NFR BLD AUTO: 0.8 % (ref 0–0.5)
LYMPHOCYTES # BLD AUTO: 1.06 10*3/MM3 (ref 0.7–3.1)
LYMPHOCYTES NFR BLD AUTO: 11.1 % (ref 19.6–45.3)
MAGNESIUM SERPL-MCNC: 2.3 MG/DL (ref 1.6–2.4)
MCH RBC QN AUTO: 30.2 PG (ref 26.6–33)
MCHC RBC AUTO-ENTMCNC: 34.1 G/DL (ref 31.5–35.7)
MCV RBC AUTO: 88.6 FL (ref 79–97)
MONOCYTES # BLD AUTO: 0.83 10*3/MM3 (ref 0.1–0.9)
MONOCYTES NFR BLD AUTO: 8.7 % (ref 5–12)
NEUTROPHILS NFR BLD AUTO: 7.44 10*3/MM3 (ref 1.7–7)
NEUTROPHILS NFR BLD AUTO: 78 % (ref 42.7–76)
NRBC BLD AUTO-RTO: 0 /100 WBC (ref 0–0.2)
PHOSPHATE SERPL-MCNC: 2.4 MG/DL (ref 2.5–4.5)
PLATELET # BLD AUTO: 160 10*3/MM3 (ref 140–450)
PMV BLD AUTO: 10.2 FL (ref 6–12)
POTASSIUM SERPL-SCNC: 3.6 MMOL/L (ref 3.5–5.2)
QT INTERVAL: 424 MS
QT INTERVAL: 438 MS
QTC INTERVAL: 492 MS
QTC INTERVAL: 495 MS
RBC # BLD AUTO: 3.41 10*6/MM3 (ref 4.14–5.8)
SODIUM SERPL-SCNC: 140 MMOL/L (ref 136–145)
TROPONIN T SERPL-MCNC: 2.28 NG/ML (ref 0–0.03)
WBC NRBC COR # BLD: 9.55 10*3/MM3 (ref 3.4–10.8)

## 2022-04-12 PROCEDURE — 80048 BASIC METABOLIC PNL TOTAL CA: CPT | Performed by: STUDENT IN AN ORGANIZED HEALTH CARE EDUCATION/TRAINING PROGRAM

## 2022-04-12 PROCEDURE — 93005 ELECTROCARDIOGRAM TRACING: CPT | Performed by: ANESTHESIOLOGY

## 2022-04-12 PROCEDURE — 83735 ASSAY OF MAGNESIUM: CPT | Performed by: STUDENT IN AN ORGANIZED HEALTH CARE EDUCATION/TRAINING PROGRAM

## 2022-04-12 PROCEDURE — 84100 ASSAY OF PHOSPHORUS: CPT | Performed by: STUDENT IN AN ORGANIZED HEALTH CARE EDUCATION/TRAINING PROGRAM

## 2022-04-12 PROCEDURE — 82962 GLUCOSE BLOOD TEST: CPT

## 2022-04-12 PROCEDURE — 84484 ASSAY OF TROPONIN QUANT: CPT | Performed by: STUDENT IN AN ORGANIZED HEALTH CARE EDUCATION/TRAINING PROGRAM

## 2022-04-12 PROCEDURE — 85025 COMPLETE CBC W/AUTO DIFF WBC: CPT | Performed by: STUDENT IN AN ORGANIZED HEALTH CARE EDUCATION/TRAINING PROGRAM

## 2022-04-12 PROCEDURE — 93010 ELECTROCARDIOGRAM REPORT: CPT | Performed by: INTERNAL MEDICINE

## 2022-04-12 RX ORDER — CEFDINIR 300 MG/1
300 CAPSULE ORAL
Status: DISCONTINUED | OUTPATIENT
Start: 2022-04-12 | End: 2022-04-12 | Stop reason: HOSPADM

## 2022-04-12 RX ORDER — CEFDINIR 300 MG/1
300 CAPSULE ORAL
Qty: 4 CAPSULE | Refills: 0 | Status: SHIPPED | OUTPATIENT
Start: 2022-04-12 | End: 2022-04-16

## 2022-04-12 RX ORDER — DOXYCYCLINE 100 MG/1
100 CAPSULE ORAL EVERY 12 HOURS SCHEDULED
Status: DISCONTINUED | OUTPATIENT
Start: 2022-04-12 | End: 2022-04-12 | Stop reason: HOSPADM

## 2022-04-12 RX ORDER — DOXYCYCLINE 100 MG/1
100 CAPSULE ORAL EVERY 12 HOURS SCHEDULED
Qty: 8 CAPSULE | Refills: 0 | Status: SHIPPED | OUTPATIENT
Start: 2022-04-12 | End: 2022-04-16

## 2022-04-12 RX ADMIN — PANTOPRAZOLE SODIUM 40 MG: 40 TABLET, DELAYED RELEASE ORAL at 08:13

## 2022-04-12 RX ADMIN — DOXYCYCLINE 100 MG: 100 CAPSULE ORAL at 09:48

## 2022-04-12 RX ADMIN — CARVEDILOL 12.5 MG: 12.5 TABLET, FILM COATED ORAL at 08:13

## 2022-04-12 RX ADMIN — NIFEDIPINE 30 MG: 10 CAPSULE ORAL at 10:25

## 2022-04-12 RX ADMIN — CEFDINIR 300 MG: 300 CAPSULE ORAL at 09:48

## 2022-04-12 RX ADMIN — HYDROCODONE BITARTRATE AND ACETAMINOPHEN 1 TABLET: 7.5; 325 TABLET ORAL at 01:41

## 2022-04-12 RX ADMIN — CLOPIDOGREL BISULFATE 75 MG: 75 TABLET ORAL at 08:13

## 2022-04-12 RX ADMIN — TORSEMIDE 20 MG: 10 TABLET ORAL at 08:14

## 2022-04-12 RX ADMIN — RIVAROXABAN 2.5 MG: 2.5 TABLET, FILM COATED ORAL at 08:13

## 2022-04-12 RX ADMIN — LOSARTAN POTASSIUM 100 MG: 50 TABLET, FILM COATED ORAL at 08:13

## 2022-04-12 RX ADMIN — Medication 10 ML: at 08:16

## 2022-04-12 NOTE — DISCHARGE SUMMARY
[facility] Medicine Services  DISCHARGE SUMMARY       Date of Admission: 4/11/2022  Date of Discharge:  4/12/2022  Primary Care Physician: Morgan Tovar MD    Presenting Problem/History of Present Illness:  Elevated troponin  Bibasilar pneumonia  ESRD on dialysis  Diabetes mellitus      Final Discharge Diagnoses:  Active Hospital Problems   Elevated troponin  Bibasilar pneumonia  ESRD on dialysis  Diabetes mellitus                    Pertinent Test Results:   Lab Results (most recent)     Procedure Component Value Units Date/Time    POC Glucose Once [[id number]]  (Normal) Collected: 04/12/22 0811    Specimen: Blood Updated: 04/12/22 0831     Glucose 125 mg/dL      Comment: : [id number] POK SOKHONMeter ID: [id number]       POC Glucose Once [[id number]]  (Abnormal) Collected: 04/11/22 2139    Specimen: Blood Updated: 04/12/22 0638     Glucose 180 mg/dL      Comment: Result Not ConfirmedOperator: [id number] JOSEER KENDRAMeter ID: [id number]       Troponin [[id number]]  (Abnormal) Collected: 04/12/22 0132    Specimen: Blood Updated: 04/12/22 0309     Troponin T 2.280 ng/mL     Narrative:      Troponin T Reference Range:  <= 0.03 ng/mL-   Negative for AMI  >0.03 ng/mL-     Abnormal for myocardial necrosis.  Clinicians would have to utilize clinical acumen, EKG, Troponin and serial changes to determine if it is an Acute Myocardial Infarction or myocardial injury due to an underlying chronic condition.       Results may be falsely decreased if patient taking Biotin.      Basic Metabolic Panel [[id number]]  (Abnormal) Collected: 04/12/22 0132    Specimen: Blood Updated: 04/12/22 0259     Glucose 211 mg/dL      BUN 19 mg/dL      Creatinine 5.26 mg/dL      Sodium 140 mmol/L      Potassium 3.6 mmol/L      Chloride 102 mmol/L      CO2 28.0 mmol/L      Calcium 8.4 mg/dL      BUN/Creatinine Ratio 3.6     Anion Gap 10.0 mmol/L      eGFR 11.1 mL/min/1.73       Comment: <15 Indicative of kidney failure       Narrative:      GFR Normal >60  Chronic Kidney Disease <60  Kidney Failure <15      Phosphorus [[id number]]  (Abnormal) Collected: 04/12/22 0132    Specimen: Blood Updated: 04/12/22 0259     Phosphorus 2.4 mg/dL     Magnesium [[id number]]  (Normal) Collected: 04/12/22 0132    Specimen: Blood Updated: 04/12/22 0259     Magnesium 2.3 mg/dL     CBC & Differential [[id number]]  (Abnormal) Collected: 04/12/22 0132    Specimen: Blood Updated: 04/12/22 0222    Narrative:      The following orders were created for panel order CBC & Differential.  Procedure                               Abnormality         Status                     ---------                               -----------         ------                     CBC Auto Differential[[id number]]        Abnormal            Final result                 Please view results for these tests on the individual orders.    CBC Auto Differential [[id number]]  (Abnormal) Collected: 04/12/22 0132    Specimen: Blood Updated: 04/12/22 0222     WBC 9.55 10*3/mm3      RBC 3.41 10*6/mm3      Hemoglobin 10.3 g/dL      Hematocrit 30.2 %      MCV 88.6 fL      MCH 30.2 pg      MCHC 34.1 g/dL      RDW 16.1 %      RDW-SD 51.6 fl      MPV 10.2 fL      Platelets 160 10*3/mm3      Neutrophil % 78.0 %      Lymphocyte % 11.1 %      Monocyte % 8.7 %      Eosinophil % 0.9 %      Basophil % 0.5 %      Immature Grans % 0.8 %      Neutrophils, Absolute 7.44 10*3/mm3      Lymphocytes, Absolute 1.06 10*3/mm3      Monocytes, Absolute 0.83 10*3/mm3      Eosinophils, Absolute 0.09 10*3/mm3      Basophils, Absolute 0.05 10*3/mm3      Immature Grans, Absolute 0.08 10*3/mm3      nRBC 0.0 /100 WBC     BNP [[id number]]  (Abnormal) Collected: 04/11/22 1926    Specimen: Blood Updated: 04/11/22 2011     proBNP 38,258.0 pg/mL     Narrative:      Among patients with dyspnea, NT-proBNP is highly sensitive for the detection of acute congestive heart failure. In addition  NT-proBNP of <300 pg/ml effectively rules out acute congestive heart failure with 99% negative predictive value.    Results may be falsely decreased if patient taking Biotin.      Troponin [[id number]]  (Abnormal) Collected: 04/11/22 1926    Specimen: Blood Updated: 04/11/22 2004     Troponin T 2.160 ng/mL     Narrative:      Troponin T Reference Range:  <= 0.03 ng/mL-   Negative for AMI  >0.03 ng/mL-     Abnormal for myocardial necrosis.  Clinicians would have to utilize clinical acumen, EKG, Troponin and serial changes to determine if it is an Acute Myocardial Infarction or myocardial injury due to an underlying chronic condition.       Results may be falsely decreased if patient taking Biotin.      TSH [[id number]]  (Normal) Collected: 04/11/22 1926    Specimen: Blood Updated: 04/11/22 1959     TSH 0.912 uIU/mL     Comprehensive Metabolic Panel [[id number]]  (Abnormal) Collected: 04/11/22 1926    Specimen: Blood Updated: 04/11/22 1957     Glucose 158 mg/dL      BUN 15 mg/dL      Creatinine 4.67 mg/dL      Sodium 139 mmol/L      Potassium 4.2 mmol/L      Comment: Slight hemolysis detected by analyzer. Results may be affected.        Chloride 101 mmol/L      CO2 25.0 mmol/L      Calcium 8.9 mg/dL      Total Protein 6.2 g/dL      Albumin 3.20 g/dL      ALT (SGPT) 21 U/L      AST (SGOT) 25 U/L      Comment: Slight hemolysis detected by analyzer. Results may be affected.        Alkaline Phosphatase 98 U/L      Total Bilirubin 0.6 mg/dL      Globulin 3.0 gm/dL      A/G Ratio 1.1 g/dL      BUN/Creatinine Ratio 3.2     Anion Gap 13.0 mmol/L      eGFR 12.8 mL/min/1.73      Comment: <15 Indicative of kidney failure       Narrative:      GFR Normal >60  Chronic Kidney Disease <60  Kidney Failure <15      C-reactive Protein [[id number]]  (Abnormal) Collected: 04/11/22 1926    Specimen: Blood Updated: 04/11/22 1954     C-Reactive Protein 2.06 mg/dL     Magnesium [[id number]]  (Normal) Collected: 04/11/22 1926    Specimen: Blood  Updated: 04/11/22 1954     Magnesium 2.0 mg/dL     Phosphorus [[id number]]  (Abnormal) Collected: 04/11/22 1926    Specimen: Blood Updated: 04/11/22 1954     Phosphorus 2.0 mg/dL     Lactic Acid, Plasma [[id number]]  (Normal) Collected: 04/11/22 1926    Specimen: Blood Updated: 04/11/22 1952     Lactate 0.9 mmol/L     CBC & Differential [[id number]]  (Abnormal) Collected: 04/11/22 1926    Specimen: Blood Updated: 04/11/22 1934    Narrative:      The following orders were created for panel order CBC & Differential.  Procedure                               Abnormality         Status                     ---------                               -----------         ------                     CBC Auto Differential[[id number]]        Abnormal            Final result                 Please view results for these tests on the individual orders.    CBC Auto Differential [[id number]]  (Abnormal) Collected: 04/11/22 1926    Specimen: Blood Updated: 04/11/22 1934     WBC 10.00 10*3/mm3      RBC 3.63 10*6/mm3      Hemoglobin 10.6 g/dL      Hematocrit 32.5 %      MCV 89.5 fL      MCH 29.2 pg      MCHC 32.6 g/dL      RDW 16.3 %      RDW-SD 53.1 fl      MPV 9.3 fL      Platelets 156 10*3/mm3      Neutrophil % 79.0 %      Lymphocyte % 11.0 %      Monocyte % 7.5 %      Eosinophil % 1.0 %      Basophil % 0.7 %      Immature Grans % 0.8 %      Neutrophils, Absolute 7.90 10*3/mm3      Lymphocytes, Absolute 1.10 10*3/mm3      Monocytes, Absolute 0.75 10*3/mm3      Eosinophils, Absolute 0.10 10*3/mm3      Basophils, Absolute 0.07 10*3/mm3      Immature Grans, Absolute 0.08 10*3/mm3      nRBC 0.0 /100 WBC         Imaging Results (Most Recent)     Procedure Component Value Units Date/Time    XR Chest 1 View [[id number]] Collected: 04/11/22 1927     Updated: 04/11/22 2015    Narrative:      EXAM DESCRIPTION:  XR CHEST 1 VW  [employer]: XR CHEST 1 VIEW     CLINICAL HISTORY:   67 years  Male;  hypoxia    TECHNOLOGIST NOTES:    TECHNIQUE: Frontal portable  view of the chest    COMPARISON: 12/27/2018.    FINDINGS:     The cardiomediastinal contour is unremarkable. There are mild  opacities in the mid to lower lungs bilaterally. Pneumonia  possible.. There is mild vascular congestion.. There is no  significant pleural effusion. There is no pneumothorax. No  evidence of pulmonary fibrosis or honeycombing.Visualized osseous  structures show no acute abnormality.        Impression:      Bibasilar pneumonias are suspected with mild superimposed  vascular congestion    Electronically signed by:  Christopher Ramírez MD  4/11/2022 8:13 PM CDT  Workstation: [id number]            Hospital Course:  67-year-old male with ESRD on dialysis, PAD, diabetes mellitus on insulin, hypertension presenting here as transfer from outside facility ([facility] in [place]) due to concern for elevated high-sensitivity troponin.  Patient originally presented to outside facility with dyspnea after missing recent dialysis session and noted to have volume overload, also concern for questionable DKA with hyperglycemia with acidosis which corrected on insulin drip.  He was hypoxemic related to volume overload which improved with dialysis.  He most recently underwent dialysis today 4/11/2022 with symptom improvement just prior to transfer here.  Upon arrival here patient is breathing comfortably on room air without any shortness of breath or chest pain.  He feels back near his baseline.  No recent fevers, chills, pleuritic discomfort or palpitations.  Review of chest x-ray at outside facility and chest x-ray are both show concern for possible bibasilar pneumonia, however suspect this may represent pneumonitis or atelectasis.  Patient monitored without any further acute events and had no complaints.  Medically stable for discharge home with instructions to resume his regularly scheduled outpatient dialysis on Wednesday, April 13.  Discharged on short course of cefdinir and doxycycline for possible  "pneumonia to take if develops any fevers, shortness of breath, or cough.  Counseled on compliance with home medications including antihypertensives and insulin regimen.  Instructed to follow-up with PCP within 1 week.            Condition on Discharge: Stable    Physical Exam on Discharge:  BP (!) 199/88 (BP Location: Left arm, Patient Position: Lying)   Pulse 80   Temp 98.6 °F (37 °C) (Temporal)   Resp 20   Ht 177.8 cm (70\")   Wt 76.4 kg (168 lb 8 oz)   SpO2 92%   BMI 24.18 kg/m²   Physical Exam  General: Resting in no acute distress  Psych: Calm and cooperative  HEENT: NC/AT, no scleral icterus  Cardiovascular: Normal rate, regular rhythm, pulses 2+  Respiratory: No wheezes rales or rhonchi, normal work of breathing  Abdomen: Soft, nontender, bowel sounds present  Neurologic: Nonfocal, normal speech, follows commands  Musculoskeletal: No deformities  Extremities: No clubbing cyanosis or edema  Skin: Warm and dry, no rashes        Discharge Disposition:  Home or Self Care    Discharge Medications:     Discharge Medications      New Medications      Instructions Start Date   cefdinir 300 MG capsule  Commonly known as: OMNICEF   300 mg, Oral, Every 24 Hours Scheduled      doxycycline 100 MG capsule  Commonly known as: MONODOX   100 mg, Oral, Every 12 Hours Scheduled         Changes to Medications      Instructions Start Date   atorvastatin 80 MG tablet  Commonly known as: LIPITOR  What changed: how much to take   80 mg, Oral, Nightly         Continue These Medications      Instructions Start Date   calcitriol 0.5 MCG capsule  Commonly known as: ROCALTROL   0.5 mcg, Oral, Daily      calcium acetate 667 MG capsule capsule  Commonly known as: PHOS BINDER)   1,334 mg, Oral, 3 Times Daily      carvedilol 12.5 MG tablet  Commonly known as: COREG   12.5 mg, Oral, 2 Times Daily With Meals      clopidogrel 75 MG tablet  Commonly known as: PLAVIX   75 mg, Oral, Daily      eplerenone 25 MG tablet  Commonly known as: " INSPRA   50 mg, Oral, Daily      HYDROcodone-acetaminophen 7.5-325 MG per tablet  Commonly known as: NORCO   1 tablet, Oral, Every 8 Hours PRN      insulin detemir 100 UNIT/ML injection  Commonly known as: LEVEMIR   20 Units, Subcutaneous, Nightly      insulin lispro 100 UNIT/ML injection  Commonly known as: humaLOG   10 Units, Subcutaneous, 3 Times Daily Before Meals, Sliding scale       lidocaine-prilocaine 2.5-2.5 % cream  Commonly known as: EMLA   1 application, Topical, Once, m-w-f       losartan 25 MG tablet  Commonly known as: COZAAR   100 mg, Oral, Daily      Medihoney Wound/Burn Dressing gel   Apply externally, Daily      NIFEdipine XL 30 MG 24 hr tablet  Commonly known as: PROCARDIA XL   30 mg, Oral, Daily      NIFEDIPINE PO   30 mg, Oral, 2 Times Daily      nitroglycerin 0.4 MG SL tablet  Commonly known as: NITROSTAT   0.4 mg, Sublingual, Every 5 Minutes PRN, Take no more than 3 doses in 15 minutes.      omeprazole 20 MG capsule  Commonly known as: priLOSEC   20 mg, Oral, Daily      Rivaroxaban 2.5 MG tablet  Commonly known as: Xarelto   2.5 mg, Oral, 2 Times Daily      torsemide 20 MG tablet  Commonly known as: DEMADEX   20 mg, Oral, 3 Times Daily      vitamin D 1.25 MG (74611 UT) capsule capsule  Commonly known as: ERGOCALCIFEROL   50,000 Units, Oral, Weekly             Discharge Diet: Renal, consistent carbohydrate    Activity at Discharge: as tolerated    Discharge Care Plan/Instructions:  Follow up with your primary care provider within 1 week.      Follow-up Appointments:   Future Appointments   Date Time Provider Department Center   6/30/2022  9:00 AM [facility] ROOM [facility]   6/30/2022  9:30 AM [facility] ROOM [facility]   6/30/2022 10:00 AM Constance Rincon APRN [facility]               Time:  35 minutes.      Nikos Dias MD

## 2022-04-12 NOTE — PROGRESS NOTES
THC Physician - Brief Progress Note  PERMANENT  04/11/2022 20:42    [facility] - CCU/SD - 20 - [place] ([facility])    DAGMAR VALLADARES    Date of Service 04/11/2022 20:42    HPI/Events of Note [facility] Provider Assessment Note  Tele ICU Focused Assessment Note - Information obtained from patient's chart  Patient visit performed via telemedicine. Physical assessment performed through a variety of methods, such as, not limited to  visual inspection, use of electronic assessment devices and assistance from bedside hospital staff    68y/o M with sig. Pmhx of , not limited to IDDM, HTN,  ESRD on dialysis, PAD, admitted to ICU due to acute hypoxic resp failure r/t fluid overload,  elevated trop, DKA on insulin drip.  No chest pain per chart     VS  temp 97.5   HR 77   RR 20 BP  173/79  SpO2  95 percent  at RA    Pert labs   WBC 10.00  Hgb 10.6    NA  136   K 4.6  BUN 23  Creat 4.25  LA 0.9  EKG : NSR No STEMI seen     Assessment and Plan   Elevated troponin  Likely related to demanded ischemia from acute hypoxia event and poor Cr clearance early  Cont to monitor  ESRD/ HD / fluid overload  Rec’d HD currently asymptomatic  Cont to monitor / trend trop and ordering another repeat EKG  pt is on Xarelto   HTN resume home antihypertensive meds      Pt is on Xarelto , no DVT prophy needed    x_____   Video Assessment performed  x_____   Most recent labs reviewed  x_____   Vital Signs reviewed  x_____   Best Practices addressed:                 VTE prophylaxis:                 SUP (when indicated):                 Glycemic control:                      Please notify bedside physician when present or [place] if glc > 180 X 2                 Sepsis guidelines:                 Lung protective strategy                 Targeted Temperature Management:    x_____     Spoke with bedside RN  x_____     Orders written      Contact [facility] for any needs if  bedside physician is not present.      Interventions Minor-Clinical assessment - ordering diagnostic tests, Communication with other healthcare providers and/or family        Electronically Signed by: Jeff Madsen) on 04/11/2022 20:51

## 2022-04-12 NOTE — NURSING NOTE
Discharge instructions given to patient and spouse (Angella Morrell). Patient understood that prescriptions were to be picked up from [facility]. Medication side effects, changes, and new medications were explained to patient and spouse. Patient and spouse voiced understanding of all explained discharge instructions. No further questions at this time.

## 2022-04-12 NOTE — SIGNIFICANT NOTE
Pt continues to refuse to wear cardiac monitor, 02 probe, and b/p cuff.  Dr Azar and Dr Madsen ([employer]) aware.

## 2022-04-13 NOTE — OUTREACH NOTE
Prep Survey    Flowsheet Row Responses   [facility] facility patient discharged from? [place]   Is LACE score < 7 ? No   Emergency Room discharge w/ pulse ox? No   Eligibility Readm Mgmt   Discharge diagnosis Bibasilar pneumonia   Does the patient have one of the following disease processes/diagnoses(primary or secondary)? COPD/Pneumonia   Does the patient have Home health ordered? No   Is there a DME ordered? No   Prep survey completed? Yes          SYED MIDDLETON - Registered Nurse

## 2022-04-18 ENCOUNTER — READMISSION MANAGEMENT (OUTPATIENT)
Dept: CALL CENTER | Facility: HOSPITAL | Age: 67
End: 2022-04-18

## 2022-04-18 NOTE — OUTREACH NOTE
COPD/PN Week 1 Survey    Flowsheet Row Responses   [facility] patient discharged from? [place]   Does the patient have one of the following disease processes/diagnoses(primary or secondary)? COPD/Pneumonia   Was the primary reason for admission: Pneumonia   Week 1 attempt successful? Yes   Call start time 1339   Call end time 1340   Discharge diagnosis Bibasilar pneumonia   Is patient permission given to speak with other caregiver? Yes   List who call center can speak with Spouse   Person spoke with today (if not patient) and relationship Spouse   Meds reviewed with patient/caregiver? Yes   Is the patient having any side effects they believe may be caused by any medication additions or changes? No   Does the patient have all medications ordered at discharge? Yes   Is the patient taking all medications as directed (includes completed medication regime)? Yes   Does the patient have a primary care provider?  Yes   Does the patient have an appointment with their PCP or specialist within 7 days of discharge? Yes   Comments regarding PCP PCP telehealth 4/14/22   Has the patient kept scheduled appointments due by today? Yes   Has home health visited the patient within 72 hours of discharge? N/A   Pulse Ox monitoring None   Psychosocial issues? No   Did the patient receive a copy of their discharge instructions? Yes   Nursing interventions Reviewed instructions with patient   What is the patient's perception of their health status since discharge? Improving   Nursing Interventions Nurse provided patient education   Are the patient's immunizations up to date?  Yes   Nursing interventions Educated on importance of maintaining up to date immunizations as advised by provider   If the patient is a current smoker, are they able to teach back resources for cessation? Smoking cessation medications   Is the patient/caregiver able to teach back the hierarchy of who to call/visit for symptoms/problems? PCP, Specialist, Home  health nurse, Urgent Care, ED, 911 Yes   Is the patient/caregiver able to teach back signs and symptoms of worsening condition: Fever/chills, Shortness of breath, Chest pain   Is the patient/caregiver able to teach back importance of completing antibiotic course of treatment? Yes   Week 1 call completed? Yes          OWEN SANTACRUZ - Registered Nurse

## 2022-06-30 ENCOUNTER — OFFICE VISIT (OUTPATIENT)
Dept: CARDIAC SURGERY | Facility: CLINIC | Age: 67
End: 2022-06-30

## 2022-06-30 VITALS
HEART RATE: 70 BPM | TEMPERATURE: 98.6 F | WEIGHT: 167.2 LBS | BODY MASS INDEX: 23.94 KG/M2 | DIASTOLIC BLOOD PRESSURE: 68 MMHG | OXYGEN SATURATION: 99 % | HEIGHT: 70 IN | SYSTOLIC BLOOD PRESSURE: 134 MMHG

## 2022-06-30 DIAGNOSIS — I79.8 OTHER DISORDERS OF ARTERIES, ARTERIOLES AND CAPILLARIES IN DISEASES CLASSIFIED ELSEWHERE: ICD-10-CM

## 2022-06-30 DIAGNOSIS — I65.23 CAROTID STENOSIS, ASYMPTOMATIC, BILATERAL: ICD-10-CM

## 2022-06-30 DIAGNOSIS — I73.9 PVD (PERIPHERAL VASCULAR DISEASE): Primary | ICD-10-CM

## 2022-06-30 DIAGNOSIS — N18.5 CKD (CHRONIC KIDNEY DISEASE) STAGE 5, GFR LESS THAN 15 ML/MIN: ICD-10-CM

## 2022-06-30 DIAGNOSIS — E78.2 MIXED HYPERLIPIDEMIA: ICD-10-CM

## 2022-06-30 DIAGNOSIS — F17.200 TOBACCO DEPENDENCE: ICD-10-CM

## 2022-06-30 DIAGNOSIS — I10 BENIGN ESSENTIAL HYPERTENSION: ICD-10-CM

## 2022-06-30 PROCEDURE — 99214 OFFICE O/P EST MOD 30 MIN: CPT | Performed by: NURSE PRACTITIONER

## 2022-06-30 RX ORDER — CLOPIDOGREL BISULFATE 75 MG/1
75 TABLET ORAL DAILY
Qty: 90 TABLET | Refills: 3
Start: 2022-06-30 | End: 2022-06-30 | Stop reason: SDUPTHER

## 2022-06-30 RX ORDER — CLOPIDOGREL BISULFATE 75 MG/1
75 TABLET ORAL DAILY
Qty: 90 TABLET | Refills: 3 | Status: SHIPPED | OUTPATIENT
Start: 2022-06-30

## 2022-06-30 NOTE — PROGRESS NOTES
CVTS Office Progress Note       Subjective   Patient ID: Darwin Morrell is a 67 y.o. male is being seen for follow up.    Chief Complaint:    Chief Complaint   Patient presents with   • Carotid Artery Disease   • Peripheral Vascular Disease       The following portions of the patient's history were reviewed and updated as appropriate: allergies, current medications, past family history, past medical history, past social history, past surgical history and problem list.  Recent images independently reviewed.  Available laboratory values reviewed.    PCP:  Morgan Tovar MD   Nephrology:  [facility] ([facility]) MWF     67 y.o. male with HTN(stable, increased risk stroke, rupture), Hyperlipidemia(stable, increased risk cardiovascular events), Diabetes Mellitus(stable, increased risk cardiovascular events), Smoker(uncontrolled, increased risk cardiovascular events) and Chronic Kidney Disease stage 5(stable, on dialysis)  Diabetic Ulcer RIGHT great toe. Callus x 6 weeks, now with dry gangrene. Has been soaking (epsom salts) at home and applying antibiotic cream.  smokes 1 PPD.  No TIA stroke amaurosis.  No MI claudication. No other associated signs, symptoms or modifying factors.      RIGHT CEA  12/2021 Carotid Duplex: RIGHT 50-69% (164/1.2) LEFT 50-69% (154/1.3) Antegrade  6/2022: Carotid Duplex: RIGHT 50-69% (152/30cm/s, ratio 1.4) LEFT 0-49% (113/18cm/s, ratio 1.8) Antegrade     5/5/2020:  TOMMY: RIGHT 0.5 Biphasic (CFV-DP) LEFT 0.78 Tri/Biphasic (DP/PT)  5/2020 CTA Aorta runoff:  RIGHT external iliac artery 80%, SFA moderate diffuse disease, 3v runoff.  LEFT CFA 70%, SFA 90% prox 70% distal. 2v runoff.  5/14/2020: PTA/Stent RIGHT EIA  5/21/2020 TOMMY: RIGHT 1.0 Tri/Biphasic(PT/DP)  LEFT 0.7 Tri/Biphasic (PT)  7/1/2020:  TOMMY: RIGHT 0.95 Triphasic LEFT 0.71 Tri/Biphasic (Pop/DP)  10/21/2020: TOMMY: RIGHT 1.0 Triphasic LEFT 0.81 Tri/Biphasic (Pop/DP). REIA stent patent-triphasic  4/22/2021: TOMMY: RIGHT 1.1 Triphasic LEFT  0.69 Tri/Biphasic (Pop/DP)   6/2021 TOMMY: Right 1.05 triphasic.  Left 0.68 biphasic  7/8/21:PTA LEFT SFA/CFA  7/27/21: TOMMY: RIGHT 1.0 Triphasic LEFT 0.73 Biphasic   9/7/21: TOMMY: RIGHT 1.0 Triphasic LEFT 0.95 Triphasic/Biphasic (PT)   12/14/2021: TOMMY: RIGHT 1.0 Triphasic LEFT 0.85 Tri/Biphasic (PT)   6/2022: TOMMY: RIGHT 1.1 Triphasic LEFT 0.9 Triphasic     2/2018 Echocardiogram:  EF 60%, LA 40mm, LV 48mm, RVSP 39mmHg.  Tr MR TR.      Past Medical History:   Diagnosis Date   • Callus    • Diabetes mellitus (HCC)    • ESRD (end stage renal disease) (HCC)    • Foot ulcer (HCC)    • GERD (gastroesophageal reflux disease)    • Hyperlipidemia    • Hypertension    • Smoker      Past Surgical History:   Procedure Laterality Date   • AMPUTATION     • AMPUTATION DIGIT Right 5/28/2020    Procedure: HULLUX AMPUTATION AND ALL OTHER INDICATED PROCDURES;  Surgeon: David Murguia DPM;  Location: [facility] OR;  Service: Podiatry;  Laterality: Right;   • APPENDECTOMY     • ARTERIOGRAM N/A 5/14/2020    Procedure: abdominal aortogram bilateral lower extremity runoff possible angioplasty stent thrombolysis atherectomy;  Surgeon: Ruben Gustafson MD;  Location: [facility]GIO INVASIVE LOCATION;  Service: Interventional Radiology;  Laterality: N/A;   • ARTERIOVENOUS FISTULA Right 02/2019   • CAROTID ENDARTERECTOMY Right    • INTERVENTIONAL RADIOLOGY PROCEDURE Left 7/8/2021    Procedure: Abdominal Aortagram with Runoff;  Surgeon: Ruben Gustafson MD;  Location: [facility] ANGIO INVASIVE LOCATION;  Service: Interventional Radiology;  Laterality: Left;     Family History   Problem Relation Age of Onset   • Diabetes Mother    • Diabetes Father    • Diabetes Sister    • Cancer Brother    • Diabetes Brother      Social History     Tobacco Use   • Smoking status: Current Every Day Smoker     Packs/day: 0.50     Years: 55.00     Pack years: 27.50   • Smokeless tobacco: Former User   Substance Use Topics   • Alcohol use: Yes     Comment: used  to drink   • Drug use: No       ALLERGIES:   Patient has no known allergies.    MEDICATIONS:      Current Outpatient Medications:   •  atorvastatin (LIPITOR) 80 MG tablet, Take 1 tablet by mouth Every Night. (Patient taking differently: Take 40 mg by mouth Every Night.), Disp: 30 tablet, Rfl: 0  •  calcitriol (ROCALTROL) 0.5 MCG capsule, Take 1 capsule by mouth Daily., Disp: 30 capsule, Rfl: 0  •  calcium acetate (PHOS BINDER,) 667 MG capsule capsule, Take 1,334 mg by mouth 3 (Three) Times a Day., Disp: , Rfl:   •  carvedilol (COREG) 12.5 MG tablet, Take 12.5 mg by mouth 2 (Two) Times a Day With Meals., Disp: , Rfl:   •  clopidogrel (PLAVIX) 75 MG tablet, Take 1 tablet by mouth Daily., Disp: 90 tablet, Rfl: 3  •  eplerenone (INSPRA) 25 MG tablet, Take 50 mg by mouth Daily., Disp: , Rfl:   •  HYDROcodone-acetaminophen (NORCO) 7.5-325 MG per tablet, Take 1 tablet by mouth Every 8 (Eight) Hours As Needed for Moderate Pain ., Disp: , Rfl:   •  insulin detemir (LEVEMIR) 100 UNIT/ML injection, Inject 20 Units under the skin into the appropriate area as directed Every Night., Disp: 10 mL, Rfl: 0  •  insulin lispro (humaLOG) 100 UNIT/ML injection, Inject 10 Units under the skin into the appropriate area as directed 3 (Three) Times a Day Before Meals. Sliding scale, Disp: , Rfl:   •  lidocaine-prilocaine (EMLA) 2.5-2.5 % cream, Apply 1 application topically to the appropriate area as directed 1 (One) Time. m-w-f, Disp: , Rfl:   •  losartan (COZAAR) 25 MG tablet, Take 100 mg by mouth Daily., Disp: , Rfl:   •  NIFEdipine XL (PROCARDIA XL) 30 MG 24 hr tablet, Take 30 mg by mouth Daily., Disp: , Rfl:   •  nitroglycerin (NITROSTAT) 0.4 MG SL tablet, Place 1 tablet under the tongue Every 5 (Five) Minutes As Needed for Chest Pain. Take no more than 3 doses in 15 minutes., Disp: 100 tablet, Rfl: 12  •  omeprazole (priLOSEC) 20 MG capsule, Take 20 mg by mouth Daily., Disp: , Rfl:   •  Rivaroxaban (Xarelto) 2.5 MG tablet, Take 1  "tablet by mouth 2 (Two) Times a Day., Disp: 60 tablet, Rfl:   •  torsemide (DEMADEX) 20 MG tablet, Take 20 mg by mouth 3 (Three) Times a Day., Disp: , Rfl:   •  vitamin D (ERGOCALCIFEROL) 1.25 MG (80462 UT) capsule capsule, Take 50,000 Units by mouth 1 (One) Time Per Week., Disp: , Rfl:   •  Wound Dressings (Medihoney Wound/Burn Dressing) gel, Apply  topically Daily., Disp: , Rfl:   •  NIFEDIPINE PO, Take 30 mg by mouth 2 (Two) Times a Day., Disp: , Rfl:     Review of Systems   Constitutional: Negative for fever.   Eyes: Negative for visual disturbance.   Cardiovascular: Negative for claudication, cyanosis and leg swelling.   Respiratory: Negative for shortness of breath.    Skin: Positive for color change, dry skin, nail changes and poor wound healing.   Musculoskeletal: Positive for arthritis. Negative for muscle weakness.   Gastrointestinal: Negative for dysphagia.   Neurological: Negative for focal weakness, light-headedness, loss of balance, numbness, paresthesias and weakness.   Psychiatric/Behavioral: Negative for altered mental status.   All other systems reviewed and are negative.       Objective   Vitals:    06/30/22 0933   BP: 134/68   BP Location: Left arm   Pulse: 70   Temp: 98.6 °F (37 °C)   TempSrc: Infrared   SpO2: 99%   Weight: 75.8 kg (167 lb 3.2 oz)   Height: 177.8 cm (70\")     Body mass index is 23.99 kg/m².  Physical Exam   Constitutional: He is oriented to person, place, and time.   HENT:   Head: Atraumatic.   Cardiovascular: Normal rate and normal heart sounds.   Pulses:       Dorsalis pedis pulses are 2+ on the right side and 1+ on the left side.        Posterior tibial pulses are 2+ on the right side and 1+ on the left side.   (R) AV Fistula: +thrill/bruit   Pulmonary/Chest: Effort normal and breath sounds normal.   Abdominal: Soft. Bowel sounds are normal.   Musculoskeletal: Normal range of motion.   Neurological: He is alert and oriented to person, place, and time. Gait normal.   Skin: " Skin is warm and dry. Capillary refill takes less than 2 seconds.   Psychiatric: Thought content normal.   Vitals reviewed.        Assessment & Plan     Independent Review of Radiographic Studies:  Detailed discussion regarding risks, benefits, and treatment plan. Images independently reviewed. Patient understands, agrees, and wishes to proceed with plan.      1. PVD (peripheral vascular disease) (HCC)  Normal  Arterial Flow bilateral Lower Extremity improved  Medical Management: PLAVIX,STATIN   PVD Xarelto-Call for Samples  If signs and symptoms of ischemia should occur including but not limited to pale/blue discoloration of limb, increasing pain with ambulation or at rest, or a non-healing wound. Patient is to notify [facility] center for immediate evaluation.   Return 1 year   - Doppler Ankle Brachial Index Single Level CAR; Future  - Duplex Carotid Ultrasound CAR; Future  - clopidogrel (PLAVIX) 75 MG tablet; Take 1 tablet by mouth Daily.  Dispense: 90 tablet; Refill: 3    2. Carotid stenosis, asymptomatic, bilateral  moderate Carotid Artery Stenosis right remained stable Asymptomatic   Medical Management: PLAVIX,STATIN   If you should experience any neurological symptoms including but not limited to visual or speech disturbances confusion, seizures, or weakness of limbs of one side of your body notify Heart and Vascular center immediately for evaluation or if after hours present to the nearest Emergency Department.    Return 1 yr     3. Benign essential hypertension  controlled    4. Mixed hyperlipidemia  Lipid-lowering therapy has been proven beneficial in patients with cardio-vascular disease. Current guidelines recommend statin treatment for all patients with PAD,CAD and carotid stenosis. Statins are beneficial in preventing cardiovascular events, increasing functional capacity and lower the risk of adverse limb loss in PAD. Statins decrease the progression of plaque formation and may improve  peripheral vessel lining, and aid in reversing atherosclerosis.       5. CKD (chronic kidney disease) stage 5, GFR less than 15 ml/min ([facility])  Continue dialysis as scheduled. If problems should arise including difficulty with access, high pressure alarms, or inability to complete dialysis please notify [facility] immediately for evaluation. MWF.     6. Tobacco dependence  Smoking cessation assistance options offered including behavioral counseling (Smoking Cessation Classes), Nicotine replacement therapy (patches or gum), pharmacologic therapy (Chantix, Wellbutrin). Understands tobacco increases risk of expanding AAA, MI, CVA, PAD, carcinoma. Discussion and question answer period 5-7 minutes. Darwin Morrell  reports that he has been smoking. He has a 27.50 pack-year smoking history. He has quit using smokeless tobacco.. I have educated him on the risk of diseases from using tobacco products such as cancer, COPD and heart disease.     I advised him to quit and he is not willing to quit.    I spent 5 minutes counseling the patient.          7. Other disorders of arteries, arterioles and capillaries in diseases classified elsewhere (HCC)   - Duplex Carotid Ultrasound CAR; Future       Advance Care Planning discussed and  Informational packet given to patient. Advance Care Plan on file: No    Your BMI is Body mass index is 23.99 kg/m². and falls within  Normal: 18.5-24.9kg/m2. BMI is strongly correlated with increased health risks. Review options for weight management, heart healthy diet, and exercise programs.     Time spent 30 minutes in face to face evaluation, reviewing of medical history, tests, and procedures. Independent interpretation of vascular studies, ordering additional tests, documentation, and coordination of care.   Counseling and education with the patient and family regarding treatment options, plan of care, and hoped for outcomes. All questions answered.           This document has  been electronically signed by KIMBERLY Villalpando on June 30, 2022 10:38 CDT

## 2022-06-30 NOTE — PATIENT INSTRUCTIONS
Normal   Arterial Flow bilateral Lower Extremity improved  Medical Management: PLAVIX,STATIN   PVD Xarelto-Call for Samples  If signs and symptoms of ischemia should occur including but not limited to pale/blue discoloration of limb, increasing pain with ambulation or at rest, or a non-healing wound. Patient is to notify Heart and Vascular center for immediate evaluation.   Return 1 year     moderate Carotid Artery Stenosis right remained stable Asymptomatic   Medical Management: PLAVIX,STATIN   If you should experience any neurological symptoms including but not limited to visual or speech disturbances confusion, seizures, or weakness of limbs of one side of your body notify Heart and Vascular center immediately for evaluation or if after hours present to the nearest Emergency Department.    Return 1 yr     Smoking cessation advised.  Tobacco increases risk of expanding AAA, MI, CVA, PAD, carcinoma.

## 2022-07-29 NOTE — PROGRESS NOTES
Darwin Morrell  1955  65 y.o. male   M. SHELIA Tovar - 5/12/2020  BS -  134 per pt    Patient presents today for a post op recheck of his right great toe amputation.    06/10/2020     Chief Complaint   Patient presents with   • Right Foot - Post-op       History of Present Illness    Darwin Morrell is a 65 y.o.male who presents to clinic for his second postoperative visit.  Patient had right hallux amputation secondary to gangrene on May 28.       Past Medical History:   Diagnosis Date   • Callus    • Diabetes mellitus (CMS/HCC)    • ESRD (end stage renal disease) (CMS/HCC)    • GERD (gastroesophageal reflux disease)    • Hyperlipidemia    • Hypertension    • Smoker          Past Surgical History:   Procedure Laterality Date   • AMPUTATION     • AMPUTATION DIGIT Right 5/28/2020    Procedure: HULLUX AMPUTATION AND ALL OTHER INDICATED PROCDURES;  Surgeon: David Murguia DPM;  Location: [facility] OR;  Service: Podiatry;  Laterality: Right;   • APPENDECTOMY     • ARTERIOGRAM N/A 5/14/2020    Procedure: abdominal aortogram bilateral lower extremity runoff possible angioplasty stent thrombolysis atherectomy;  Surgeon: Ruben Gustafson MD;  Location: [facility] ANGIO INVASIVE LOCATION;  Service: Interventional Radiology;  Laterality: N/A;   • ARTERIOVENOUS FISTULA Right 02/2019   • CAROTID ENDARTERECTOMY Right          Family History   Problem Relation Age of Onset   • Diabetes Mother    • Diabetes Father    • Diabetes Sister    • Cancer Brother    • Diabetes Brother        No Known Allergies    Social History     Socioeconomic History   • Marital status:      Spouse name: Not on file   • Number of children: Not on file   • Years of education: Not on file   • Highest education level: Not on file   Tobacco Use   • Smoking status: Current Every Day Smoker     Packs/day: 1.00     Years: 55.00     Pack years: 55.00   • Smokeless tobacco: Former User   Substance and Sexual Activity   • Alcohol use: Yes      Lab Results   Component Value Date     07/28/2022     07/27/2022     (L) 07/26/2022     (L) 07/25/2022     (L) 07/24/2022     Thrombocytopenia most likely 2/2 liver cirrhosis   Give 1 unit of platelets if count drops below 50,000 "Frequency: Never     Comment: used to drink   • Drug use: No   • Sexual activity: Defer         Current Outpatient Medications   Medication Sig Dispense Refill   • aspirin 81 MG EC tablet Take 1 tablet by mouth Daily. 150 tablet 2   • atorvastatin (LIPITOR) 80 MG tablet Take 1 tablet by mouth Every Night. 30 tablet 0   • calcitriol (ROCALTROL) 0.5 MCG capsule Take 1 capsule by mouth Daily. 30 capsule 0   • calcium acetate (PHOS BINDER,) 667 MG capsule capsule Take 1,334 mg by mouth 3 (Three) Times a Day.     • carvedilol (COREG) 12.5 MG tablet Take 12.5 mg by mouth 2 (Two) Times a Day With Meals.     • clopidogrel (PLAVIX) 75 MG tablet Take 1 tablet by mouth Daily. 30 tablet 11   • HYDROcodone-acetaminophen (NORCO) 7.5-325 MG per tablet Take 1 tablet by mouth Every 8 (Eight) Hours As Needed for Moderate Pain .     • insulin detemir (LEVEMIR) 100 UNIT/ML injection Inject 20 Units under the skin into the appropriate area as directed Every Night. 10 mL 0   • insulin lispro (humaLOG) 100 UNIT/ML injection Inject 10 Units under the skin into the appropriate area as directed 3 (Three) Times a Day Before Meals. Sliding scale     • losartan (COZAAR) 25 MG tablet Take 25 mg by mouth Daily.     • nitroglycerin (NITROSTAT) 0.4 MG SL tablet Place 1 tablet under the tongue Every 5 (Five) Minutes As Needed for Chest Pain. Take no more than 3 doses in 15 minutes. 100 tablet 12   • omeprazole (priLOSEC) 20 MG capsule Take 20 mg by mouth Daily.     • torsemide (DEMADEX) 20 MG tablet Take 20 mg by mouth 2 (Two) Times a Day.       No current facility-administered medications for this visit.        Review of Systems   Constitutional: Negative.    HENT: Negative.    Eyes: Positive for visual disturbance.   Respiratory: Negative.    Cardiovascular: Negative.    Gastrointestinal: Negative.    Psychiatric/Behavioral: Negative.          OBJECTIVE    Pulse 80   Ht 177.8 cm (70\")   Wt 72.6 kg (160 lb)   SpO2 99%   BMI 22.96 kg/m²       "       Physical Exam   Constitutional: He is oriented to person, place, and time. He appears well-developed and well-nourished. No distress.   HENT:   Head: Normocephalic and atraumatic.   Nose: Nose normal.   Pulmonary/Chest: Effort normal. No respiratory distress.   Musculoskeletal: Normal range of motion. He exhibits no edema.   Neurological: He is alert and oriented to person, place, and time.   Psychiatric: He has a normal mood and affect. His behavior is normal. Thought content normal.   Vitals reviewed.      Lower Extremity: Incision site is macerated and dehisced. Mild edema.  No surrounding erythema.  CFT is immediate to distal digits.  Negative Homans.  Sensation intact light touch.    Procedures        ASSESSMENT AND PLAN    Darwin was seen today for post-op.    Diagnoses and all orders for this visit:    Gangrene of toe of right foot (CMS/HCC)    Dehiscence of amputation stump (CMS/HCC)      - dehiscence noted to incision site.  Some sutures were removed.  Excisional debridement performed with dermal curette.  Healthy bleeding tissue noted.  Wound was dressed.  Patient instructed on daily dressing changes.  Recheck 1 week           This document has been electronically signed by David Murguia DPM on Maribell 10, 2020 16:07     6/10/2020  16:07

## 2022-12-02 DIAGNOSIS — I70.244 ATHSCL NATIVE ART OF LEFT LEG W ULCER OF HEEL AND MIDFOOT: ICD-10-CM

## 2023-07-25 ENCOUNTER — OFFICE VISIT (OUTPATIENT)
Dept: CARDIAC SURGERY | Facility: CLINIC | Age: 68
End: 2023-07-25
Payer: MEDICARE

## 2023-07-25 VITALS
HEART RATE: 67 BPM | WEIGHT: 152 LBS | DIASTOLIC BLOOD PRESSURE: 68 MMHG | HEIGHT: 70 IN | OXYGEN SATURATION: 97 % | SYSTOLIC BLOOD PRESSURE: 121 MMHG | BODY MASS INDEX: 21.76 KG/M2

## 2023-07-25 DIAGNOSIS — N18.5 CKD (CHRONIC KIDNEY DISEASE) STAGE 5, GFR LESS THAN 15 ML/MIN: ICD-10-CM

## 2023-07-25 DIAGNOSIS — I73.9 PVD (PERIPHERAL VASCULAR DISEASE): Primary | ICD-10-CM

## 2023-07-25 DIAGNOSIS — I10 BENIGN ESSENTIAL HYPERTENSION: ICD-10-CM

## 2023-07-25 DIAGNOSIS — I65.23 CAROTID STENOSIS, ASYMPTOMATIC, BILATERAL: ICD-10-CM

## 2023-07-25 DIAGNOSIS — E78.2 MIXED HYPERLIPIDEMIA: ICD-10-CM

## 2023-07-25 DIAGNOSIS — F17.200 TOBACCO DEPENDENCE: ICD-10-CM

## 2023-08-01 DIAGNOSIS — I73.9 PVD (PERIPHERAL VASCULAR DISEASE): ICD-10-CM

## 2023-08-01 RX ORDER — CLOPIDOGREL BISULFATE 75 MG/1
75 TABLET ORAL DAILY
Qty: 90 TABLET | Refills: 3 | Status: SHIPPED | OUTPATIENT
Start: 2023-08-01

## (undated) DEVICE — PERCLOSE PROGLIDE™ SUTURE-MEDIATED CLOSURE SYSTEM: Brand: PERCLOSE PROGLIDE™

## (undated) DEVICE — PAD HEMOST NEPTUNE 2X2IN

## (undated) DEVICE — RADIFOCUS GLIDECATH: Brand: GLIDECATH

## (undated) DEVICE — PINNACLE INTRODUCER SHEATH: Brand: PINNACLE

## (undated) DEVICE — STERILE POLYISOPRENE POWDER-FREE SURGICAL GLOVES WITH EMOLLIENT COATING: Brand: PROTEXIS

## (undated) DEVICE — PK POD 60

## (undated) DEVICE — GOWN,PREVENTION PLUS,XLNG/XXLARGE,STRL: Brand: MEDLINE

## (undated) DEVICE — RADIFOCUS GLIDEWIRE: Brand: GLIDEWIRE

## (undated) DEVICE — PAD,ABDOMINAL,8"X10",ST,LF: Brand: MEDLINE

## (undated) DEVICE — CATH GUIDE SOFTVU SELECT/V HT OMNI .038 5F 65CM

## (undated) DEVICE — KT INTRO MINISTICK MAX W/GW PALLADIUM ECHO 4F 21G 7CM

## (undated) DEVICE — SKIN PREP TRAY W/CHG: Brand: MEDLINE INDUSTRIES, INC.

## (undated) DEVICE — GLV SURG SENSICARE PI ORTHO SZ6.5 LF STRL

## (undated) DEVICE — INTRO FLEXOR CHECKFLO TM 2 .038IN 6F45CM

## (undated) DEVICE — CONTAINER,SPECIMEN,OR STERILE,4OZ: Brand: MEDLINE

## (undated) DEVICE — SPNG LAP 18X18IN LF STRL PK/5

## (undated) DEVICE — DRSNG GZ CURAD XEROFORM NONADHS 5X9IN STRL

## (undated) DEVICE — TBG HI PRESSURE 500PSI 20IN

## (undated) DEVICE — BALN PTA LUTONIX DRUGCOAT LP .035 5F 5X150MM

## (undated) DEVICE — ANTIBACTERIAL UNDYED BRAIDED (POLYGLACTIN 910), SYNTHETIC ABSORBABLE SUTURE: Brand: COATED VICRYL

## (undated) DEVICE — ULTRAVERSE 035 PTA DILATATION CATHETER 6.0MM X 40MM BALLOON, 130CM SHAFT: Brand: ULTRAVERSE® 035 PTA DILATATION CATHETER

## (undated) DEVICE — SUT ETHLN 4/0 PS2 18IN 1667H

## (undated) DEVICE — BALN PTA LUTONIX DRUGCOAT LP .035 5F 7X40MM

## (undated) DEVICE — PK ANGIO LF 60

## (undated) DEVICE — Device

## (undated) DEVICE — ULTRAVERSE 035 PTA DILATATION CATHETER 7.0MM X 80MM BALLOON, 130CM SHAFT: Brand: ULTRAVERSE® 035 PTA DILATATION CATHETER